# Patient Record
Sex: FEMALE | Race: WHITE | NOT HISPANIC OR LATINO | URBAN - METROPOLITAN AREA
[De-identification: names, ages, dates, MRNs, and addresses within clinical notes are randomized per-mention and may not be internally consistent; named-entity substitution may affect disease eponyms.]

---

## 2018-02-07 ENCOUNTER — EMERGENCY (EMERGENCY)
Facility: HOSPITAL | Age: 83
LOS: 1 days | Discharge: HOME | End: 2018-02-07
Attending: EMERGENCY MEDICINE | Admitting: EMERGENCY MEDICINE
Payer: MEDICARE

## 2018-02-07 VITALS
RESPIRATION RATE: 18 BRPM | OXYGEN SATURATION: 94 % | HEART RATE: 55 BPM | DIASTOLIC BLOOD PRESSURE: 76 MMHG | TEMPERATURE: 98 F | SYSTOLIC BLOOD PRESSURE: 164 MMHG

## 2018-02-07 LAB
ALBUMIN SERPL ELPH-MCNC: 3.2 G/DL — LOW (ref 3.3–5)
ALP SERPL-CCNC: 61 U/L — SIGNIFICANT CHANGE UP (ref 40–120)
ALT FLD-CCNC: 7 U/L RC — LOW (ref 10–45)
ANION GAP SERPL CALC-SCNC: 11 MMOL/L — SIGNIFICANT CHANGE UP (ref 5–17)
APPEARANCE UR: ABNORMAL
AST SERPL-CCNC: 12 U/L — SIGNIFICANT CHANGE UP (ref 10–40)
BASOPHILS # BLD AUTO: 0 K/UL — SIGNIFICANT CHANGE UP (ref 0–0.2)
BASOPHILS NFR BLD AUTO: 0.3 % — SIGNIFICANT CHANGE UP (ref 0–2)
BILIRUB SERPL-MCNC: 0.9 MG/DL — SIGNIFICANT CHANGE UP (ref 0.2–1.2)
BILIRUB UR-MCNC: NEGATIVE — SIGNIFICANT CHANGE UP
BUN SERPL-MCNC: 8 MG/DL — SIGNIFICANT CHANGE UP (ref 7–23)
CALCIUM SERPL-MCNC: 8.8 MG/DL — SIGNIFICANT CHANGE UP (ref 8.4–10.5)
CHLORIDE SERPL-SCNC: 106 MMOL/L — SIGNIFICANT CHANGE UP (ref 96–108)
CO2 SERPL-SCNC: 27 MMOL/L — SIGNIFICANT CHANGE UP (ref 22–31)
COLOR SPEC: YELLOW — SIGNIFICANT CHANGE UP
CREAT SERPL-MCNC: 0.58 MG/DL — SIGNIFICANT CHANGE UP (ref 0.5–1.3)
DIFF PNL FLD: NEGATIVE — SIGNIFICANT CHANGE UP
DIGOXIN SERPL-MCNC: 0.9 NG/ML — SIGNIFICANT CHANGE UP (ref 0.8–2)
EOSINOPHIL # BLD AUTO: 0.1 K/UL — SIGNIFICANT CHANGE UP (ref 0–0.5)
EOSINOPHIL NFR BLD AUTO: 1.5 % — SIGNIFICANT CHANGE UP (ref 0–6)
EPI CELLS # UR: SIGNIFICANT CHANGE UP /HPF
GAS PNL BLDV: SIGNIFICANT CHANGE UP
GAS PNL BLDV: SIGNIFICANT CHANGE UP
GLUCOSE SERPL-MCNC: 89 MG/DL — SIGNIFICANT CHANGE UP (ref 70–99)
GLUCOSE UR QL: NEGATIVE — SIGNIFICANT CHANGE UP
HCT VFR BLD CALC: 45.6 % — HIGH (ref 34.5–45)
HGB BLD-MCNC: 15.3 G/DL — SIGNIFICANT CHANGE UP (ref 11.5–15.5)
KETONES UR-MCNC: NEGATIVE — SIGNIFICANT CHANGE UP
LEUKOCYTE ESTERASE UR-ACNC: ABNORMAL
LYMPHOCYTES # BLD AUTO: 1.4 K/UL — SIGNIFICANT CHANGE UP (ref 1–3.3)
LYMPHOCYTES # BLD AUTO: 14.8 % — SIGNIFICANT CHANGE UP (ref 13–44)
MCHC RBC-ENTMCNC: 32.6 PG — SIGNIFICANT CHANGE UP (ref 27–34)
MCHC RBC-ENTMCNC: 33.5 GM/DL — SIGNIFICANT CHANGE UP (ref 32–36)
MCV RBC AUTO: 97.3 FL — SIGNIFICANT CHANGE UP (ref 80–100)
MONOCYTES # BLD AUTO: 0.6 K/UL — SIGNIFICANT CHANGE UP (ref 0–0.9)
MONOCYTES NFR BLD AUTO: 6.7 % — SIGNIFICANT CHANGE UP (ref 2–14)
NEUTROPHILS # BLD AUTO: 7.4 K/UL — SIGNIFICANT CHANGE UP (ref 1.8–7.4)
NEUTROPHILS NFR BLD AUTO: 76.7 % — SIGNIFICANT CHANGE UP (ref 43–77)
NITRITE UR-MCNC: POSITIVE
PH UR: 7 — SIGNIFICANT CHANGE UP (ref 5–8)
PLATELET # BLD AUTO: 236 K/UL — SIGNIFICANT CHANGE UP (ref 150–400)
POTASSIUM SERPL-MCNC: 4.2 MMOL/L — SIGNIFICANT CHANGE UP (ref 3.5–5.3)
POTASSIUM SERPL-SCNC: 4.2 MMOL/L — SIGNIFICANT CHANGE UP (ref 3.5–5.3)
PROT SERPL-MCNC: 6 G/DL — SIGNIFICANT CHANGE UP (ref 6–8.3)
PROT UR-MCNC: SIGNIFICANT CHANGE UP
RAPID RVP RESULT: SIGNIFICANT CHANGE UP
RBC # BLD: 4.68 M/UL — SIGNIFICANT CHANGE UP (ref 3.8–5.2)
RBC # FLD: 12.9 % — SIGNIFICANT CHANGE UP (ref 10.3–14.5)
RBC CASTS # UR COMP ASSIST: SIGNIFICANT CHANGE UP /HPF (ref 0–2)
SODIUM SERPL-SCNC: 144 MMOL/L — SIGNIFICANT CHANGE UP (ref 135–145)
SP GR SPEC: 1.02 — SIGNIFICANT CHANGE UP (ref 1.01–1.02)
UROBILINOGEN FLD QL: NEGATIVE — SIGNIFICANT CHANGE UP
WBC # BLD: 9.6 K/UL — SIGNIFICANT CHANGE UP (ref 3.8–10.5)
WBC # FLD AUTO: 9.6 K/UL — SIGNIFICANT CHANGE UP (ref 3.8–10.5)
WBC UR QL: SIGNIFICANT CHANGE UP /HPF (ref 0–5)

## 2018-02-07 PROCEDURE — 71045 X-RAY EXAM CHEST 1 VIEW: CPT

## 2018-02-07 PROCEDURE — 99284 EMERGENCY DEPT VISIT MOD MDM: CPT | Mod: 25

## 2018-02-07 PROCEDURE — 82565 ASSAY OF CREATININE: CPT

## 2018-02-07 PROCEDURE — 93010 ELECTROCARDIOGRAM REPORT: CPT

## 2018-02-07 PROCEDURE — 70450 CT HEAD/BRAIN W/O DYE: CPT | Mod: 26

## 2018-02-07 PROCEDURE — 80162 ASSAY OF DIGOXIN TOTAL: CPT

## 2018-02-07 PROCEDURE — 96374 THER/PROPH/DIAG INJ IV PUSH: CPT

## 2018-02-07 PROCEDURE — 70450 CT HEAD/BRAIN W/O DYE: CPT

## 2018-02-07 PROCEDURE — 87086 URINE CULTURE/COLONY COUNT: CPT

## 2018-02-07 PROCEDURE — 82962 GLUCOSE BLOOD TEST: CPT

## 2018-02-07 PROCEDURE — 83605 ASSAY OF LACTIC ACID: CPT

## 2018-02-07 PROCEDURE — 84132 ASSAY OF SERUM POTASSIUM: CPT

## 2018-02-07 PROCEDURE — 82435 ASSAY OF BLOOD CHLORIDE: CPT

## 2018-02-07 PROCEDURE — 84295 ASSAY OF SERUM SODIUM: CPT

## 2018-02-07 PROCEDURE — 87186 SC STD MICRODIL/AGAR DIL: CPT

## 2018-02-07 PROCEDURE — 87798 DETECT AGENT NOS DNA AMP: CPT

## 2018-02-07 PROCEDURE — 85014 HEMATOCRIT: CPT

## 2018-02-07 PROCEDURE — 93005 ELECTROCARDIOGRAM TRACING: CPT

## 2018-02-07 PROCEDURE — 87633 RESP VIRUS 12-25 TARGETS: CPT

## 2018-02-07 PROCEDURE — 87486 CHLMYD PNEUM DNA AMP PROBE: CPT

## 2018-02-07 PROCEDURE — 85027 COMPLETE CBC AUTOMATED: CPT

## 2018-02-07 PROCEDURE — 81001 URINALYSIS AUTO W/SCOPE: CPT

## 2018-02-07 PROCEDURE — 87581 M.PNEUMON DNA AMP PROBE: CPT

## 2018-02-07 PROCEDURE — 82803 BLOOD GASES ANY COMBINATION: CPT

## 2018-02-07 PROCEDURE — 82947 ASSAY GLUCOSE BLOOD QUANT: CPT

## 2018-02-07 PROCEDURE — 80053 COMPREHEN METABOLIC PANEL: CPT

## 2018-02-07 PROCEDURE — 82330 ASSAY OF CALCIUM: CPT

## 2018-02-07 PROCEDURE — 71045 X-RAY EXAM CHEST 1 VIEW: CPT | Mod: 26

## 2018-02-07 RX ORDER — CEFTRIAXONE 500 MG/1
1 INJECTION, POWDER, FOR SOLUTION INTRAMUSCULAR; INTRAVENOUS ONCE
Qty: 0 | Refills: 0 | Status: COMPLETED | OUTPATIENT
Start: 2018-02-07 | End: 2018-02-07

## 2018-02-07 RX ORDER — CEFUROXIME AXETIL 250 MG
1 TABLET ORAL
Qty: 10 | Refills: 0 | OUTPATIENT
Start: 2018-02-07 | End: 2018-02-11

## 2018-02-07 RX ADMIN — CEFTRIAXONE 100 GRAM(S): 500 INJECTION, POWDER, FOR SOLUTION INTRAMUSCULAR; INTRAVENOUS at 11:30

## 2018-02-07 NOTE — ED PROVIDER NOTE - CARE PLAN
Principal Discharge DX:	Encephalopathy acute Principal Discharge DX:	Encephalopathy acute  Secondary Diagnosis:	UTI (urinary tract infection) Principal Discharge DX:	Encephalopathy acute  Assessment and plan of treatment:	You were found to have a urinary tract infection. Please complete the antibiotics as prescribed.     Return to the ED for repetitive nausea or vomiting, worsening confusion or any other concerns.     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  Secondary Diagnosis:	UTI (urinary tract infection)

## 2018-02-07 NOTE — ED PROVIDER NOTE - PLAN OF CARE
You were found to have a urinary tract infection. Please complete the antibiotics as prescribed.     Return to the ED for repetitive nausea or vomiting, worsening confusion or any other concerns.     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

## 2018-02-07 NOTE — ED PROVIDER NOTE - MEDICAL DECISION MAKING DETAILS
Attending MD Campo: 89F with h/o dementia, afib (no reported AC) presenting from assisted living facility with report of confusion, unknown baseline, pt oriented x 1 here, no focal neuro deficits. Ddx broad but includes infectious or metabolic encephalopathy. Plan for UA, CXR, labs, CT head and re-eval

## 2018-02-07 NOTE — ED PROVIDER NOTE - OBJECTIVE STATEMENT
89 yo female with PMHx of Dementia, HTN, HLD, CAD on plavix, Afib on digoxin, recurrent UTIs p/w AMS.  Per EMS, patient coming from [facility]  Staff reporting the patient had altered mental status this AM and appeared more lethargic so they sent her to the ER. Patient unable to provide additional history.  She reports that she is hungry, but otherwise has no complaints.  Currently AAOx 2, baseline unknown.

## 2018-02-07 NOTE — ED PROVIDER NOTE - PROGRESS NOTE DETAILS
Attending MD Campo: patient eating well currently. Labs unrevealing, no signs of sepsis. UA consistent with UTI, likely cause of mild confusion. Given IV CTX, will dc back to assisted living facility with PO ceftin.

## 2018-02-07 NOTE — ED ADULT NURSE NOTE - OBJECTIVE STATEMENT
Pt is a 89 year old female sent in from  nursing home for lethargy. No distress. Breathing easy and non labored. Able to move all extremities. Pt alert with intermittent confusion. Skin warm and dry to touch. No chills. No diaphoresis. Skin intact. Lower extremities with mild swelling.

## 2018-02-07 NOTE — ED PROVIDER NOTE - PHYSICAL EXAMINATION
Attending MD Campo: A & O x 1, forgetful, NAD,, EOMI b/l, PERRL b/l; lungs CTAB, heart with reg rhythm without murmur; abdomen soft NTND; 2+ pitting edema b/l ankles; affect appropriate. neuro exam non focal with no motor or sensory deficits.

## 2018-02-07 NOTE — ED PROVIDER NOTE - ATTENDING CONTRIBUTION TO CARE
Attending MD Campo:   I personally have seen and examined this patient.  Physician assistant note reviewed and agree on plan of care and except where noted.  See HPI, PE, and MDM for details.

## 2018-02-09 RX ORDER — CEFPODOXIME PROXETIL 100 MG
1 TABLET ORAL
Qty: 20 | Refills: 0 | OUTPATIENT
Start: 2018-02-09 | End: 2018-02-18

## 2018-02-09 NOTE — ED POST DISCHARGE NOTE - DETAILS
patient is at [facility], left message for Elizabeth Haider who is caring for the patient.

## 2018-02-09 NOTE — ED POST DISCHARGE NOTE - OTHER COMMUNICATION
I spoke w Elizabeth Haider and discussed the need to switch ceftin to cefpodoxime. - Daron Layne PA-C

## 2018-02-09 NOTE — ED POST DISCHARGE NOTE - RESULT SUMMARY
UCx with > 100,000 cfu/ml of EColi. Prescribed ceftin. Awaiting sensitivities. UCx with > 100,000 cfu/ml of EColi. Prescribed ceftin. Awaiting sensitivities.. Sensitivities returned and Intermetiate sensitivity for 2nd gen cephalosporin. Will prescribe either bactrim or cefpodoxime. - Daron Layne PA-C

## 2018-03-24 ENCOUNTER — EMERGENCY (EMERGENCY)
Facility: HOSPITAL | Age: 83
LOS: 1 days | Discharge: HOME | End: 2018-03-24
Attending: EMERGENCY MEDICINE
Payer: MEDICARE

## 2018-03-24 VITALS
SYSTOLIC BLOOD PRESSURE: 164 MMHG | OXYGEN SATURATION: 95 % | HEART RATE: 60 BPM | DIASTOLIC BLOOD PRESSURE: 75 MMHG | RESPIRATION RATE: 20 BRPM | TEMPERATURE: 98 F

## 2018-03-24 VITALS
TEMPERATURE: 98 F | SYSTOLIC BLOOD PRESSURE: 139 MMHG | DIASTOLIC BLOOD PRESSURE: 83 MMHG | OXYGEN SATURATION: 94 % | HEART RATE: 67 BPM

## 2018-03-24 LAB
ACANTHOCYTES BLD QL SMEAR: SLIGHT — SIGNIFICANT CHANGE UP
ALBUMIN SERPL ELPH-MCNC: 3.5 G/DL — SIGNIFICANT CHANGE UP (ref 3.3–5)
ALP SERPL-CCNC: 68 U/L — SIGNIFICANT CHANGE UP (ref 40–120)
ALT FLD-CCNC: 13 U/L RC — SIGNIFICANT CHANGE UP (ref 10–45)
ANION GAP SERPL CALC-SCNC: 12 MMOL/L — SIGNIFICANT CHANGE UP (ref 5–17)
ANISOCYTOSIS BLD QL: SLIGHT — SIGNIFICANT CHANGE UP
AST SERPL-CCNC: 20 U/L — SIGNIFICANT CHANGE UP (ref 10–40)
BASE EXCESS BLDV CALC-SCNC: -0.9 MMOL/L — SIGNIFICANT CHANGE UP (ref -2–2)
BASOPHILS # BLD AUTO: 0 K/UL — SIGNIFICANT CHANGE UP (ref 0–0.2)
BASOPHILS NFR BLD AUTO: 0.2 % — SIGNIFICANT CHANGE UP (ref 0–2)
BILIRUB SERPL-MCNC: 0.5 MG/DL — SIGNIFICANT CHANGE UP (ref 0.2–1.2)
BUN SERPL-MCNC: 14 MG/DL — SIGNIFICANT CHANGE UP (ref 7–23)
CA-I SERPL-SCNC: 1.14 MMOL/L — SIGNIFICANT CHANGE UP (ref 1.12–1.3)
CALCIUM SERPL-MCNC: 9.1 MG/DL — SIGNIFICANT CHANGE UP (ref 8.4–10.5)
CHLORIDE BLDV-SCNC: 112 MMOL/L — HIGH (ref 96–108)
CHLORIDE SERPL-SCNC: 104 MMOL/L — SIGNIFICANT CHANGE UP (ref 96–108)
CO2 BLDV-SCNC: 23 MMOL/L — SIGNIFICANT CHANGE UP (ref 22–30)
CO2 SERPL-SCNC: 23 MMOL/L — SIGNIFICANT CHANGE UP (ref 22–31)
CREAT SERPL-MCNC: 0.67 MG/DL — SIGNIFICANT CHANGE UP (ref 0.5–1.3)
EOSINOPHIL # BLD AUTO: 0.2 K/UL — SIGNIFICANT CHANGE UP (ref 0–0.5)
EOSINOPHIL NFR BLD AUTO: 1.5 % — SIGNIFICANT CHANGE UP (ref 0–6)
GAS PNL BLDV: 134 MMOL/L — LOW (ref 136–145)
GAS PNL BLDV: SIGNIFICANT CHANGE UP
GAS PNL BLDV: SIGNIFICANT CHANGE UP
GLUCOSE BLDV-MCNC: 111 MG/DL — HIGH (ref 70–99)
GLUCOSE SERPL-MCNC: 121 MG/DL — HIGH (ref 70–99)
HCO3 BLDV-SCNC: 22 MMOL/L — SIGNIFICANT CHANGE UP (ref 21–29)
HCT VFR BLD CALC: 46.5 % — HIGH (ref 34.5–45)
HCT VFR BLDA CALC: 46 % — SIGNIFICANT CHANGE UP (ref 39–50)
HGB BLD CALC-MCNC: 15 G/DL — SIGNIFICANT CHANGE UP (ref 11.5–15.5)
HGB BLD-MCNC: 15.4 G/DL — SIGNIFICANT CHANGE UP (ref 11.5–15.5)
HOROWITZ INDEX BLDV+IHG-RTO: SIGNIFICANT CHANGE UP
LACTATE BLDV-MCNC: 2 MMOL/L — SIGNIFICANT CHANGE UP (ref 0.7–2)
LYMPHOCYTES # BLD AUTO: 1.4 K/UL — SIGNIFICANT CHANGE UP (ref 1–3.3)
LYMPHOCYTES # BLD AUTO: 12.9 % — LOW (ref 13–44)
MCHC RBC-ENTMCNC: 32.1 PG — SIGNIFICANT CHANGE UP (ref 27–34)
MCHC RBC-ENTMCNC: 33.1 GM/DL — SIGNIFICANT CHANGE UP (ref 32–36)
MCV RBC AUTO: 97.1 FL — SIGNIFICANT CHANGE UP (ref 80–100)
MONOCYTES # BLD AUTO: 0.8 K/UL — SIGNIFICANT CHANGE UP (ref 0–0.9)
MONOCYTES NFR BLD AUTO: 7.2 % — SIGNIFICANT CHANGE UP (ref 2–14)
NEUTROPHILS # BLD AUTO: 8.2 K/UL — HIGH (ref 1.8–7.4)
NEUTROPHILS NFR BLD AUTO: 78.2 % — HIGH (ref 43–77)
OVALOCYTES BLD QL SMEAR: SLIGHT — SIGNIFICANT CHANGE UP
PCO2 BLDV: 34 MMHG — LOW (ref 35–50)
PH BLDV: 7.44 — SIGNIFICANT CHANGE UP (ref 7.35–7.45)
PLAT MORPH BLD: NORMAL — SIGNIFICANT CHANGE UP
PLATELET # BLD AUTO: 180 K/UL — SIGNIFICANT CHANGE UP (ref 150–400)
PO2 BLDV: 46 MMHG — HIGH (ref 25–45)
POIKILOCYTOSIS BLD QL AUTO: SLIGHT — SIGNIFICANT CHANGE UP
POTASSIUM BLDV-SCNC: 4.6 MMOL/L — SIGNIFICANT CHANGE UP (ref 3.5–5)
POTASSIUM SERPL-MCNC: 4.8 MMOL/L — SIGNIFICANT CHANGE UP (ref 3.5–5.3)
POTASSIUM SERPL-SCNC: 4.8 MMOL/L — SIGNIFICANT CHANGE UP (ref 3.5–5.3)
PROT SERPL-MCNC: 6.5 G/DL — SIGNIFICANT CHANGE UP (ref 6–8.3)
RBC # BLD: 4.79 M/UL — SIGNIFICANT CHANGE UP (ref 3.8–5.2)
RBC # FLD: 13.3 % — SIGNIFICANT CHANGE UP (ref 10.3–14.5)
RBC BLD AUTO: ABNORMAL
SAO2 % BLDV: 79 % — SIGNIFICANT CHANGE UP (ref 67–88)
SODIUM SERPL-SCNC: 139 MMOL/L — SIGNIFICANT CHANGE UP (ref 135–145)
WBC # BLD: 10.4 K/UL — SIGNIFICANT CHANGE UP (ref 3.8–10.5)
WBC # FLD AUTO: 10.4 K/UL — SIGNIFICANT CHANGE UP (ref 3.8–10.5)

## 2018-03-24 PROCEDURE — 84295 ASSAY OF SERUM SODIUM: CPT

## 2018-03-24 PROCEDURE — 71045 X-RAY EXAM CHEST 1 VIEW: CPT

## 2018-03-24 PROCEDURE — 80053 COMPREHEN METABOLIC PANEL: CPT

## 2018-03-24 PROCEDURE — 93005 ELECTROCARDIOGRAM TRACING: CPT

## 2018-03-24 PROCEDURE — 73562 X-RAY EXAM OF KNEE 3: CPT

## 2018-03-24 PROCEDURE — 70450 CT HEAD/BRAIN W/O DYE: CPT | Mod: 26

## 2018-03-24 PROCEDURE — 82803 BLOOD GASES ANY COMBINATION: CPT

## 2018-03-24 PROCEDURE — 99284 EMERGENCY DEPT VISIT MOD MDM: CPT | Mod: 25

## 2018-03-24 PROCEDURE — 85027 COMPLETE CBC AUTOMATED: CPT

## 2018-03-24 PROCEDURE — 90715 TDAP VACCINE 7 YRS/> IM: CPT

## 2018-03-24 PROCEDURE — 90471 IMMUNIZATION ADMIN: CPT

## 2018-03-24 PROCEDURE — 72170 X-RAY EXAM OF PELVIS: CPT | Mod: 26

## 2018-03-24 PROCEDURE — 85014 HEMATOCRIT: CPT

## 2018-03-24 PROCEDURE — 99284 EMERGENCY DEPT VISIT MOD MDM: CPT | Mod: 25,GC

## 2018-03-24 PROCEDURE — 71045 X-RAY EXAM CHEST 1 VIEW: CPT | Mod: 26

## 2018-03-24 PROCEDURE — 70450 CT HEAD/BRAIN W/O DYE: CPT

## 2018-03-24 PROCEDURE — 83605 ASSAY OF LACTIC ACID: CPT

## 2018-03-24 PROCEDURE — 84132 ASSAY OF SERUM POTASSIUM: CPT

## 2018-03-24 PROCEDURE — 93010 ELECTROCARDIOGRAM REPORT: CPT

## 2018-03-24 PROCEDURE — 82947 ASSAY GLUCOSE BLOOD QUANT: CPT

## 2018-03-24 PROCEDURE — 73562 X-RAY EXAM OF KNEE 3: CPT | Mod: 26,RT

## 2018-03-24 PROCEDURE — 82330 ASSAY OF CALCIUM: CPT

## 2018-03-24 PROCEDURE — 82435 ASSAY OF BLOOD CHLORIDE: CPT

## 2018-03-24 PROCEDURE — 72170 X-RAY EXAM OF PELVIS: CPT

## 2018-03-24 RX ORDER — TETANUS TOXOID, REDUCED DIPHTHERIA TOXOID AND ACELLULAR PERTUSSIS VACCINE, ADSORBED 5; 2.5; 8; 8; 2.5 [IU]/.5ML; [IU]/.5ML; UG/.5ML; UG/.5ML; UG/.5ML
0.5 SUSPENSION INTRAMUSCULAR ONCE
Qty: 0 | Refills: 0 | Status: COMPLETED | OUTPATIENT
Start: 2018-03-24 | End: 2018-03-24

## 2018-03-24 RX ORDER — ACETAMINOPHEN 500 MG
650 TABLET ORAL ONCE
Qty: 0 | Refills: 0 | Status: COMPLETED | OUTPATIENT
Start: 2018-03-24 | End: 2018-03-24

## 2018-03-24 RX ORDER — SODIUM CHLORIDE 9 MG/ML
500 INJECTION INTRAMUSCULAR; INTRAVENOUS; SUBCUTANEOUS ONCE
Qty: 0 | Refills: 0 | Status: COMPLETED | OUTPATIENT
Start: 2018-03-24 | End: 2018-03-24

## 2018-03-24 RX ADMIN — SODIUM CHLORIDE 1000 MILLILITER(S): 9 INJECTION INTRAMUSCULAR; INTRAVENOUS; SUBCUTANEOUS at 14:48

## 2018-03-24 RX ADMIN — Medication 650 MILLIGRAM(S): at 15:10

## 2018-03-24 RX ADMIN — TETANUS TOXOID, REDUCED DIPHTHERIA TOXOID AND ACELLULAR PERTUSSIS VACCINE, ADSORBED 0.5 MILLILITER(S): 5; 2.5; 8; 8; 2.5 SUSPENSION INTRAMUSCULAR at 15:10

## 2018-03-24 NOTE — ED ADULT NURSE REASSESSMENT NOTE - NS ED NURSE REASSESS COMMENT FT1
ems arrived, patient fully dressed and all belongings returned. iv removed. patient stable and had successful transfer back to [facility]

## 2018-03-24 NOTE — ED ADULT NURSE REASSESSMENT NOTE - NS ED NURSE REASSESS COMMENT FT1
pt able to ambulate with two assist, usually uses a walker. meal provided and patient tolerating well.

## 2018-03-24 NOTE — ED PROVIDER NOTE - PHYSICAL EXAMINATION
Gen: NAD, AOx2  Head: NCAT  HEENT: PERRL, oral mucosa moist, normal conjunctiva, neck supple  Lung: +crackles/rhonchi left base, +tachypneic  CV: rrr, no murmur, Normal perfusion  Abd: soft, NTND  MSK: No edema, no visible deformities, +ttp rt ttp at quadriceps insertion @ patella with extension mechanism intact, pelvis stable, FROM b/l hips without pain. no chest wall pain. no UE ttp. no midline C/T/L ttp  Neuro: No focal neurologic deficits  Skin: No rash   Psych: normal affect

## 2018-03-24 NOTE — ED PROVIDER NOTE - MEDICAL DECISION MAKING DETAILS
89yo F with unwitnessed fall in NH 1 week ago, h/o dementia, poor historian, complaining of rt knee pain with ambulation as well as cough rhinorrhea. patient in no distress but appears tachypneic with rhonchi on lung exam. will r/o PNA. check basic labs. xray rt knee- has ecchymosis but low suspicion for bony injury. will xray. reasses 89yo F with unwitnessed fall in NH 1 week ago, h/o dementia, poor historian, complaining of rt knee pain with ambulation as well as cough rhinorrhea. patient in no distress but appears tachypneic with rhonchi on lung exam. will r/o PNA. check basic labs. xray rt knee- has ecchymosis but low suspicion for bony injury. will xray. reassoscar Fam MD: Agree with above assessment and plan.

## 2018-03-24 NOTE — ED ADULT NURSE NOTE - OBJECTIVE STATEMENT
89 y.o female brought in by ems from the [facility] for right knee pain s.p fall last week. pt is a&Ox2, unable to fully explain fall but states she hit her head "hard" and fell onto her right knee. right knee has old healing bruising present on anterior right knee, +pulses b/l, and sensation / circulation intact. strength equal b/l. patient states that she has increased pain with ambulation, patient uses a walker at the facility. patient has a skin tear to her right elbow, no active bleeding. patients head is asymptomatic, no signs of trauma. hx of a fib, dementia, anxiety, gerd, glaucoma, htn. patient takes plavix 75 mg daily.   Patient undressed and placed into gown, call bell in hand and side rails up for safety. warm blanket provided, vital signs stable, pt in no acute distress.

## 2018-03-24 NOTE — ED PROVIDER NOTE - OBJECTIVE STATEMENT
89yo F with fall last week, no syncope, fell onto rt knee and did hit top of head. on plavix. lives at NH, today alerted staff to fall and knee pain who sent to ED. has dementia so does not fully recall event. +pain with walking with walker. no HA. no vision changes. no CP/SOB. +cough mild productive. no change in appetite. +rhinorrhea. no fevers. no n/v/d. no urinary sx.

## 2018-03-24 NOTE — ED PROVIDER NOTE - PLAN OF CARE
1. Return to ED for worsening, progressive or any other concerning symptoms   2. Follow up with your primary care doctor in 2-3days   3. Take Tylenol up to 650 mg every 6 hours as needed for pain.

## 2018-03-24 NOTE — ED PROVIDER NOTE - NS ED ROS FT
ROS: no CP/SOB. +cough. no fever. no n/v/d/c. no abd pain. no rash. no bleeding. no urinary complaints. no weakness. no vision changes. no HA. no neck/back pain. +extremity swelling/deformity. No change in mental status.

## 2018-03-24 NOTE — ED ADULT NURSE REASSESSMENT NOTE - NS ED NURSE REASSESS COMMENT FT1
Pending non emergent ambulance. Crackers provided, pt tolerating well. IV removed and all paperwork in chart ready for .   Pt resting comfortably with VSS, no complaints at this time. Patient's bed in the lowest position, explained plan of care to patient and family members. Will continue to monitor.

## 2018-03-24 NOTE — ED PROVIDER NOTE - CARE PLAN
Principal Discharge DX:	Knee injuries, right, initial encounter  Assessment and plan of treatment:	1. Return to ED for worsening, progressive or any other concerning symptoms   2. Follow up with your primary care doctor in 2-3days   3. Take Tylenol up to 650 mg every 6 hours as needed for pain.  Secondary Diagnosis:	Skin tear of elbow without complication, right, initial encounter

## 2018-06-07 ENCOUNTER — INPATIENT (INPATIENT)
Facility: HOSPITAL | Age: 83
LOS: 5 days | Discharge: NURSING, REHAB OR LONG-TERM CARE | DRG: 177 | End: 2018-06-13
Admitting: INTERNAL MEDICINE
Payer: MEDICARE

## 2018-06-07 VITALS
HEART RATE: 75 BPM | OXYGEN SATURATION: 93 % | SYSTOLIC BLOOD PRESSURE: 127 MMHG | TEMPERATURE: 98 F | DIASTOLIC BLOOD PRESSURE: 67 MMHG | RESPIRATION RATE: 19 BRPM

## 2018-06-07 DIAGNOSIS — R06.03 ACUTE RESPIRATORY DISTRESS: ICD-10-CM

## 2018-06-07 LAB
ALBUMIN SERPL ELPH-MCNC: 3.4 G/DL — SIGNIFICANT CHANGE UP (ref 3.3–5)
ALP SERPL-CCNC: 76 U/L — SIGNIFICANT CHANGE UP (ref 40–120)
ALT FLD-CCNC: 8 U/L — LOW (ref 10–45)
ANION GAP SERPL CALC-SCNC: 14 MMOL/L — SIGNIFICANT CHANGE UP (ref 5–17)
APPEARANCE UR: ABNORMAL
AST SERPL-CCNC: 13 U/L — SIGNIFICANT CHANGE UP (ref 10–40)
BACTERIA # UR AUTO: ABNORMAL /HPF
BASOPHILS # BLD AUTO: 0 K/UL — SIGNIFICANT CHANGE UP (ref 0–0.2)
BILIRUB SERPL-MCNC: 0.9 MG/DL — SIGNIFICANT CHANGE UP (ref 0.2–1.2)
BILIRUB UR-MCNC: NEGATIVE — SIGNIFICANT CHANGE UP
BUN SERPL-MCNC: 18 MG/DL — SIGNIFICANT CHANGE UP (ref 7–23)
CALCIUM SERPL-MCNC: 9.2 MG/DL — SIGNIFICANT CHANGE UP (ref 8.4–10.5)
CHLORIDE SERPL-SCNC: 100 MMOL/L — SIGNIFICANT CHANGE UP (ref 96–108)
CO2 SERPL-SCNC: 23 MMOL/L — SIGNIFICANT CHANGE UP (ref 22–31)
COLOR SPEC: YELLOW — SIGNIFICANT CHANGE UP
CREAT SERPL-MCNC: 0.6 MG/DL — SIGNIFICANT CHANGE UP (ref 0.5–1.3)
DIFF PNL FLD: NEGATIVE — SIGNIFICANT CHANGE UP
DIGOXIN SERPL-MCNC: 1 NG/ML — SIGNIFICANT CHANGE UP (ref 0.8–2)
EOSINOPHIL # BLD AUTO: 0 K/UL — SIGNIFICANT CHANGE UP (ref 0–0.5)
EPI CELLS # UR: SIGNIFICANT CHANGE UP /HPF
GAS PNL BLDV: SIGNIFICANT CHANGE UP
GLUCOSE SERPL-MCNC: 131 MG/DL — HIGH (ref 70–99)
GLUCOSE UR QL: NEGATIVE — SIGNIFICANT CHANGE UP
HCT VFR BLD CALC: 47.5 % — HIGH (ref 34.5–45)
HGB BLD-MCNC: 16 G/DL — HIGH (ref 11.5–15.5)
KETONES UR-MCNC: ABNORMAL
LEUKOCYTE ESTERASE UR-ACNC: ABNORMAL
LYMPHOCYTES # BLD AUTO: 1.2 K/UL — SIGNIFICANT CHANGE UP (ref 1–3.3)
LYMPHOCYTES # BLD AUTO: 7 % — LOW (ref 13–44)
MCHC RBC-ENTMCNC: 32.9 PG — SIGNIFICANT CHANGE UP (ref 27–34)
MCHC RBC-ENTMCNC: 33.8 GM/DL — SIGNIFICANT CHANGE UP (ref 32–36)
MCV RBC AUTO: 97.4 FL — SIGNIFICANT CHANGE UP (ref 80–100)
MONOCYTES # BLD AUTO: 1.2 K/UL — HIGH (ref 0–0.9)
MONOCYTES NFR BLD AUTO: 4 % — SIGNIFICANT CHANGE UP (ref 2–14)
NEUTROPHILS # BLD AUTO: 18.7 K/UL — HIGH (ref 1.8–7.4)
NEUTROPHILS NFR BLD AUTO: 86 % — HIGH (ref 43–77)
NITRITE UR-MCNC: POSITIVE
PH UR: 6 — SIGNIFICANT CHANGE UP (ref 5–8)
PLATELET # BLD AUTO: 222 K/UL — SIGNIFICANT CHANGE UP (ref 150–400)
POTASSIUM SERPL-MCNC: 4.1 MMOL/L — SIGNIFICANT CHANGE UP (ref 3.5–5.3)
POTASSIUM SERPL-SCNC: 4.1 MMOL/L — SIGNIFICANT CHANGE UP (ref 3.5–5.3)
PROT SERPL-MCNC: 6.7 G/DL — SIGNIFICANT CHANGE UP (ref 6–8.3)
PROT UR-MCNC: 100 MG/DL
RAPID RVP RESULT: SIGNIFICANT CHANGE UP
RBC # BLD: 4.88 M/UL — SIGNIFICANT CHANGE UP (ref 3.8–5.2)
RBC # FLD: 13.8 % — SIGNIFICANT CHANGE UP (ref 10.3–14.5)
SODIUM SERPL-SCNC: 137 MMOL/L — SIGNIFICANT CHANGE UP (ref 135–145)
SP GR SPEC: 1.03 — HIGH (ref 1.01–1.02)
UROBILINOGEN FLD QL: NEGATIVE — SIGNIFICANT CHANGE UP
WBC # BLD: 21.1 K/UL — HIGH (ref 3.8–10.5)
WBC # FLD AUTO: 21.1 K/UL — HIGH (ref 3.8–10.5)
WBC UR QL: >50 /HPF (ref 0–5)

## 2018-06-07 PROCEDURE — 99223 1ST HOSP IP/OBS HIGH 75: CPT | Mod: GC

## 2018-06-07 PROCEDURE — 71045 X-RAY EXAM CHEST 1 VIEW: CPT | Mod: 26

## 2018-06-07 PROCEDURE — 93010 ELECTROCARDIOGRAM REPORT: CPT

## 2018-06-07 PROCEDURE — 99285 EMERGENCY DEPT VISIT HI MDM: CPT | Mod: 25,GC

## 2018-06-07 PROCEDURE — 99497 ADVNCD CARE PLAN 30 MIN: CPT | Mod: 25

## 2018-06-07 PROCEDURE — 71275 CT ANGIOGRAPHY CHEST: CPT | Mod: 26

## 2018-06-07 RX ORDER — SODIUM CHLORIDE 9 MG/ML
1000 INJECTION INTRAMUSCULAR; INTRAVENOUS; SUBCUTANEOUS ONCE
Qty: 0 | Refills: 0 | Status: COMPLETED | OUTPATIENT
Start: 2018-06-07 | End: 2018-06-07

## 2018-06-07 RX ORDER — IPRATROPIUM/ALBUTEROL SULFATE 18-103MCG
3 AEROSOL WITH ADAPTER (GRAM) INHALATION ONCE
Qty: 0 | Refills: 0 | Status: COMPLETED | OUTPATIENT
Start: 2018-06-07 | End: 2018-06-07

## 2018-06-07 RX ADMIN — SODIUM CHLORIDE 1000 MILLILITER(S): 9 INJECTION INTRAMUSCULAR; INTRAVENOUS; SUBCUTANEOUS at 19:35

## 2018-06-07 RX ADMIN — Medication 3 MILLILITER(S): at 18:25

## 2018-06-07 NOTE — ED PROVIDER NOTE - CARE PLAN
Principal Discharge DX:	Respiratory distress  Secondary Diagnosis:	Urinary tract infection  Secondary Diagnosis:	Hypoxia

## 2018-06-07 NOTE — ED PROVIDER NOTE - PROGRESS NOTE DETAILS
discussed with Dr. Beck, will admit.   Brandon VOSS- patient seen and reassessed. A&Ox1 at baseline per daughter in [place]. now patient seems to be responding to outside stimuli, with O2 sats on 96% on 4 L of NC. will admit.

## 2018-06-07 NOTE — ED PROVIDER NOTE - NS ED ROS FT
CONSTITUTIONAL: No fevers, no chills  Eyes: no visual changes  Ears: no ear drainage, no ear pain  Nose: +nasal congestion  Mouth/Throat: no sore throat  Cardiovascular: No Chest pain  Respiratory: No SOB, +cough   Gastrointestinal: No n/v/d, no abd pain  Genitourinary: no dysuria, no hematuria  SKIN: no rashes.  NEURO: no headache  PSYCHIATRIC: no known mental health issues.

## 2018-06-07 NOTE — ED ADULT NURSE NOTE - OBJECTIVE STATEMENT
89y f pt arrived from [facility] via ems; emt reports pt sent for cough for 2 days; had xray yesterday and no evidence of pna found; pt aox2; aware of self and at hospital; has cough and is hypoxic on room air to 89; no resp distress; no chest pain; no abd pain; no n/v/d; pt denies fever/chills; skin intact; iv placed; labs drawn; pt medicated; pt straight cathd per md order; sterile technique used; pt tolerated well; 100ml of dark yellow urine returned; pt had flies flying around her since she arrived; flies no in room prior to pt; safety/comfort maintained

## 2018-06-07 NOTE — ED PROVIDER NOTE - ATTENDING CONTRIBUTION TO CARE
89 yo F transferred from [facility] assisted living for evaluation of her cough. patient has ah/o dementia and does not provide reliable history. she admits to rhinorrhea, nasal congestion, and cough. she denies any pain. has no complaints at this time. in the room her cough is dry. h/o afib on dig. not on oxygen at home.   rectal temp 99.8.   room air oxygen 89%.   PE lungs CTA. no resp distress. abd soft and NT 89 yo F transferred from [facility] assisted living for evaluation of her cough. patient has a h/o dementia and does not provide reliable history. she admits to rhinorrhea, nasal congestion, and cough. she denies any pain. has no complaints at this time. in the room her cough is dry. h/o afib on dig. not on oxygen at home.   rectal temp 99.8.   room air oxygen 89%.   PE lungs CTA. no resp distress. abd soft and NT  ed workup shows CXR with RLL opacity c/w atelectasis versus pna. EKG with T wave inversions, no STEMI. CTA obtained given hypoxia and tachycardia. grossly negative for PE. UA positive for infection. leukocytosis 21.1. elevated lactate.   patient given IVFs, duonebs, and empiric abx for pna with vanco/zosyn/levaquin for HCAP. on pt re-eval oxygen saturation improved, VS stable. patient admitted in stable condition    Based on patient's HPI as well as the results of today's workup, I felt that patient warranted admission to the hospital for further workup/evaluation and continued management. I discussed the findings of today's workup with the patient and addressed the patient's questions and concerns. The patient was agreeable with admission. I spoke with the hospitalist who accepted the patient for admission and subsequently took over the patient's care.

## 2018-06-07 NOTE — ED PROVIDER NOTE - OBJECTIVE STATEMENT
89 YOF PMHx of Dementia, HTN, HLD, CAD on plavix, Afib on digoxin, DNR. Pt presents with a cough and URI symptoms x 2 days, pt had a cxr done yesterday which was negative for consolidation at outside hospital. Pt denies fevers, denies SOB, denies back pain, denies chest pain.

## 2018-06-08 DIAGNOSIS — R03.0 ELEVATED BLOOD-PRESSURE READING, WITHOUT DIAGNOSIS OF HYPERTENSION: ICD-10-CM

## 2018-06-08 DIAGNOSIS — K21.9 GASTRO-ESOPHAGEAL REFLUX DISEASE WITHOUT ESOPHAGITIS: ICD-10-CM

## 2018-06-08 DIAGNOSIS — R05 COUGH: ICD-10-CM

## 2018-06-08 DIAGNOSIS — R14.0 ABDOMINAL DISTENSION (GASEOUS): ICD-10-CM

## 2018-06-08 DIAGNOSIS — F03.90 UNSPECIFIED DEMENTIA WITHOUT BEHAVIORAL DISTURBANCE: ICD-10-CM

## 2018-06-08 DIAGNOSIS — J98.19 OTHER PULMONARY COLLAPSE: ICD-10-CM

## 2018-06-08 DIAGNOSIS — Z71.89 OTHER SPECIFIED COUNSELING: ICD-10-CM

## 2018-06-08 DIAGNOSIS — N39.0 URINARY TRACT INFECTION, SITE NOT SPECIFIED: ICD-10-CM

## 2018-06-08 DIAGNOSIS — Z29.9 ENCOUNTER FOR PROPHYLACTIC MEASURES, UNSPECIFIED: ICD-10-CM

## 2018-06-08 DIAGNOSIS — I25.10 ATHEROSCLEROTIC HEART DISEASE OF NATIVE CORONARY ARTERY WITHOUT ANGINA PECTORIS: ICD-10-CM

## 2018-06-08 DIAGNOSIS — I48.91 UNSPECIFIED ATRIAL FIBRILLATION: ICD-10-CM

## 2018-06-08 DIAGNOSIS — J96.01 ACUTE RESPIRATORY FAILURE WITH HYPOXIA: ICD-10-CM

## 2018-06-08 LAB
ANION GAP SERPL CALC-SCNC: 13 MMOL/L — SIGNIFICANT CHANGE UP (ref 5–17)
BASOPHILS # BLD AUTO: 0.02 K/UL — SIGNIFICANT CHANGE UP (ref 0–0.2)
BASOPHILS NFR BLD AUTO: 0.1 % — SIGNIFICANT CHANGE UP (ref 0–2)
BUN SERPL-MCNC: 13 MG/DL — SIGNIFICANT CHANGE UP (ref 7–23)
CALCIUM SERPL-MCNC: 8.3 MG/DL — LOW (ref 8.4–10.5)
CHLORIDE SERPL-SCNC: 103 MMOL/L — SIGNIFICANT CHANGE UP (ref 96–108)
CO2 SERPL-SCNC: 23 MMOL/L — SIGNIFICANT CHANGE UP (ref 22–31)
CREAT SERPL-MCNC: 0.5 MG/DL — SIGNIFICANT CHANGE UP (ref 0.5–1.3)
EOSINOPHIL # BLD AUTO: 0.01 K/UL — SIGNIFICANT CHANGE UP (ref 0–0.5)
EOSINOPHIL NFR BLD AUTO: 0.1 % — SIGNIFICANT CHANGE UP (ref 0–6)
GLUCOSE SERPL-MCNC: 107 MG/DL — HIGH (ref 70–99)
HCT VFR BLD CALC: 42.5 % — SIGNIFICANT CHANGE UP (ref 34.5–45)
HGB BLD-MCNC: 14.1 G/DL — SIGNIFICANT CHANGE UP (ref 11.5–15.5)
IMM GRANULOCYTES NFR BLD AUTO: 0.4 % — SIGNIFICANT CHANGE UP (ref 0–1.5)
LACTATE SERPL-SCNC: 1 MMOL/L — SIGNIFICANT CHANGE UP (ref 0.7–2)
LYMPHOCYTES # BLD AUTO: 1.05 K/UL — SIGNIFICANT CHANGE UP (ref 1–3.3)
LYMPHOCYTES # BLD AUTO: 6.3 % — LOW (ref 13–44)
MAGNESIUM SERPL-MCNC: 1.7 MG/DL — SIGNIFICANT CHANGE UP (ref 1.6–2.6)
MCHC RBC-ENTMCNC: 31.3 PG — SIGNIFICANT CHANGE UP (ref 27–34)
MCHC RBC-ENTMCNC: 33.2 GM/DL — SIGNIFICANT CHANGE UP (ref 32–36)
MCV RBC AUTO: 94.2 FL — SIGNIFICANT CHANGE UP (ref 80–100)
MONOCYTES # BLD AUTO: 1.05 K/UL — HIGH (ref 0–0.9)
MONOCYTES NFR BLD AUTO: 6.3 % — SIGNIFICANT CHANGE UP (ref 2–14)
NEUTROPHILS # BLD AUTO: 14.48 K/UL — HIGH (ref 1.8–7.4)
NEUTROPHILS NFR BLD AUTO: 86.8 % — HIGH (ref 43–77)
PHOSPHATE SERPL-MCNC: 1.9 MG/DL — LOW (ref 2.5–4.5)
PLATELET # BLD AUTO: 215 K/UL — SIGNIFICANT CHANGE UP (ref 150–400)
POTASSIUM SERPL-MCNC: 3.3 MMOL/L — LOW (ref 3.5–5.3)
POTASSIUM SERPL-SCNC: 3.3 MMOL/L — LOW (ref 3.5–5.3)
RBC # BLD: 4.51 M/UL — SIGNIFICANT CHANGE UP (ref 3.8–5.2)
RBC # FLD: 15.2 % — HIGH (ref 10.3–14.5)
SODIUM SERPL-SCNC: 139 MMOL/L — SIGNIFICANT CHANGE UP (ref 135–145)
WBC # BLD: 16.67 K/UL — HIGH (ref 3.8–10.5)
WBC # FLD AUTO: 16.67 K/UL — HIGH (ref 3.8–10.5)

## 2018-06-08 PROCEDURE — 99233 SBSQ HOSP IP/OBS HIGH 50: CPT | Mod: GC

## 2018-06-08 RX ORDER — LANOLIN ALCOHOL/MO/W.PET/CERES
6 CREAM (GRAM) TOPICAL AT BEDTIME
Qty: 0 | Refills: 0 | Status: DISCONTINUED | OUTPATIENT
Start: 2018-06-08 | End: 2018-06-10

## 2018-06-08 RX ORDER — ESCITALOPRAM OXALATE 10 MG/1
10 TABLET, FILM COATED ORAL DAILY
Qty: 0 | Refills: 0 | Status: DISCONTINUED | OUTPATIENT
Start: 2018-06-08 | End: 2018-06-13

## 2018-06-08 RX ORDER — PIPERACILLIN AND TAZOBACTAM 4; .5 G/20ML; G/20ML
3.38 INJECTION, POWDER, LYOPHILIZED, FOR SOLUTION INTRAVENOUS ONCE
Qty: 0 | Refills: 0 | Status: COMPLETED | OUTPATIENT
Start: 2018-06-08 | End: 2018-06-08

## 2018-06-08 RX ORDER — ACETAMINOPHEN 500 MG
650 TABLET ORAL EVERY 6 HOURS
Qty: 0 | Refills: 0 | Status: DISCONTINUED | OUTPATIENT
Start: 2018-06-08 | End: 2018-06-13

## 2018-06-08 RX ORDER — ENOXAPARIN SODIUM 100 MG/ML
40 INJECTION SUBCUTANEOUS EVERY 24 HOURS
Qty: 0 | Refills: 0 | Status: DISCONTINUED | OUTPATIENT
Start: 2018-06-08 | End: 2018-06-13

## 2018-06-08 RX ORDER — LATANOPROST 0.05 MG/ML
1 SOLUTION/ DROPS OPHTHALMIC; TOPICAL AT BEDTIME
Qty: 0 | Refills: 0 | Status: DISCONTINUED | OUTPATIENT
Start: 2018-06-08 | End: 2018-06-13

## 2018-06-08 RX ORDER — TIMOLOL 0.5 %
1 DROPS OPHTHALMIC (EYE) AT BEDTIME
Qty: 0 | Refills: 0 | Status: DISCONTINUED | OUTPATIENT
Start: 2018-06-08 | End: 2018-06-13

## 2018-06-08 RX ORDER — ACETAMINOPHEN 500 MG
2 TABLET ORAL
Qty: 0 | Refills: 0 | COMMUNITY

## 2018-06-08 RX ORDER — LACTOBACILLUS ACIDOPHILUS 100MM CELL
1 CAPSULE ORAL DAILY
Qty: 0 | Refills: 0 | Status: DISCONTINUED | OUTPATIENT
Start: 2018-06-08 | End: 2018-06-08

## 2018-06-08 RX ORDER — POTASSIUM CHLORIDE 20 MEQ
40 PACKET (EA) ORAL ONCE
Qty: 0 | Refills: 0 | Status: COMPLETED | OUTPATIENT
Start: 2018-06-08 | End: 2018-06-08

## 2018-06-08 RX ORDER — POTASSIUM PHOSPHATE, MONOBASIC POTASSIUM PHOSPHATE, DIBASIC 236; 224 MG/ML; MG/ML
15 INJECTION, SOLUTION INTRAVENOUS ONCE
Qty: 0 | Refills: 0 | Status: COMPLETED | OUTPATIENT
Start: 2018-06-08 | End: 2018-06-08

## 2018-06-08 RX ORDER — POTASSIUM CHLORIDE 20 MEQ
20 PACKET (EA) ORAL
Qty: 0 | Refills: 0 | Status: DISCONTINUED | OUTPATIENT
Start: 2018-06-08 | End: 2018-06-08

## 2018-06-08 RX ORDER — PANTOPRAZOLE SODIUM 20 MG/1
40 TABLET, DELAYED RELEASE ORAL
Qty: 0 | Refills: 0 | Status: DISCONTINUED | OUTPATIENT
Start: 2018-06-08 | End: 2018-06-13

## 2018-06-08 RX ORDER — LACTOBACILLUS ACIDOPHILUS 100MM CELL
1 CAPSULE ORAL
Qty: 0 | Refills: 0 | Status: DISCONTINUED | OUTPATIENT
Start: 2018-06-08 | End: 2018-06-12

## 2018-06-08 RX ORDER — METOPROLOL TARTRATE 50 MG
25 TABLET ORAL
Qty: 0 | Refills: 0 | Status: DISCONTINUED | OUTPATIENT
Start: 2018-06-08 | End: 2018-06-13

## 2018-06-08 RX ORDER — DIGOXIN 250 MCG
0.12 TABLET ORAL DAILY
Qty: 0 | Refills: 0 | Status: DISCONTINUED | OUTPATIENT
Start: 2018-06-08 | End: 2018-06-13

## 2018-06-08 RX ORDER — PIPERACILLIN AND TAZOBACTAM 4; .5 G/20ML; G/20ML
3.38 INJECTION, POWDER, LYOPHILIZED, FOR SOLUTION INTRAVENOUS EVERY 8 HOURS
Qty: 0 | Refills: 0 | Status: DISCONTINUED | OUTPATIENT
Start: 2018-06-08 | End: 2018-06-12

## 2018-06-08 RX ORDER — CLOPIDOGREL BISULFATE 75 MG/1
75 TABLET, FILM COATED ORAL DAILY
Qty: 0 | Refills: 0 | Status: DISCONTINUED | OUTPATIENT
Start: 2018-06-08 | End: 2018-06-13

## 2018-06-08 RX ORDER — IPRATROPIUM/ALBUTEROL SULFATE 18-103MCG
3 AEROSOL WITH ADAPTER (GRAM) INHALATION EVERY 6 HOURS
Qty: 0 | Refills: 0 | Status: DISCONTINUED | OUTPATIENT
Start: 2018-06-08 | End: 2018-06-13

## 2018-06-08 RX ADMIN — Medication 3 MILLILITER(S): at 17:53

## 2018-06-08 RX ADMIN — Medication 3 MILLILITER(S): at 05:10

## 2018-06-08 RX ADMIN — Medication 3 MILLILITER(S): at 12:17

## 2018-06-08 RX ADMIN — Medication 3 MILLILITER(S): at 23:48

## 2018-06-08 RX ADMIN — PIPERACILLIN AND TAZOBACTAM 25 GRAM(S): 4; .5 INJECTION, POWDER, LYOPHILIZED, FOR SOLUTION INTRAVENOUS at 11:35

## 2018-06-08 RX ADMIN — POTASSIUM PHOSPHATE, MONOBASIC POTASSIUM PHOSPHATE, DIBASIC 62.5 MILLIMOLE(S): 236; 224 INJECTION, SOLUTION INTRAVENOUS at 15:55

## 2018-06-08 RX ADMIN — Medication 10 MILLIGRAM(S): at 21:44

## 2018-06-08 RX ADMIN — Medication 1 DROP(S): at 23:47

## 2018-06-08 RX ADMIN — ENOXAPARIN SODIUM 40 MILLIGRAM(S): 100 INJECTION SUBCUTANEOUS at 05:07

## 2018-06-08 RX ADMIN — Medication 1 TABLET(S): at 13:18

## 2018-06-08 RX ADMIN — Medication 40 MILLIEQUIVALENT(S): at 09:47

## 2018-06-08 RX ADMIN — PIPERACILLIN AND TAZOBACTAM 200 GRAM(S): 4; .5 INJECTION, POWDER, LYOPHILIZED, FOR SOLUTION INTRAVENOUS at 03:01

## 2018-06-08 RX ADMIN — Medication 25 MILLIGRAM(S): at 17:53

## 2018-06-08 RX ADMIN — PANTOPRAZOLE SODIUM 40 MILLIGRAM(S): 20 TABLET, DELAYED RELEASE ORAL at 09:47

## 2018-06-08 RX ADMIN — Medication 0.12 MILLIGRAM(S): at 05:10

## 2018-06-08 RX ADMIN — CLOPIDOGREL BISULFATE 75 MILLIGRAM(S): 75 TABLET, FILM COATED ORAL at 12:17

## 2018-06-08 RX ADMIN — LATANOPROST 1 DROP(S): 0.05 SOLUTION/ DROPS OPHTHALMIC; TOPICAL at 23:45

## 2018-06-08 RX ADMIN — Medication 25 MILLIGRAM(S): at 05:10

## 2018-06-08 RX ADMIN — PIPERACILLIN AND TAZOBACTAM 25 GRAM(S): 4; .5 INJECTION, POWDER, LYOPHILIZED, FOR SOLUTION INTRAVENOUS at 21:43

## 2018-06-08 RX ADMIN — ESCITALOPRAM OXALATE 10 MILLIGRAM(S): 10 TABLET, FILM COATED ORAL at 12:17

## 2018-06-08 NOTE — PROGRESS NOTE ADULT - SUBJECTIVE AND OBJECTIVE BOX
Patient is a 89y old  Female who presents with a chief complaint of Cough (2018 01:07)      Claudine Olivares MD  PGY1 - Internal Medicine  [facility] [phone] /[facility] [address]  After 7pm and on coverage days, please page Night Float pager at [phone]    SUBJECTIVE / OVERNIGHT EVENTS:      ROS:  Gen: No fever, chills, pain  Resp: No cough, SOB  CV: No chest pain, palpitations  GI: No nausea, vomiting, abdominal pain, diarrhea, constipation, melena, hematcohezia  MSK: No arthralgia, weakness, parasthesia  Skin: No rash  ROS negative unless otherwise noted    MEDICATIONS  (STANDING):  ALBUTerol/ipratropium for Nebulization 3 milliLiter(s) Nebulizer every 6 hours  clopidogrel Tablet 75 milliGRAM(s) Oral daily  digoxin     Tablet 0.125 milliGRAM(s) Oral daily  enoxaparin Injectable 40 milliGRAM(s) SubCutaneous every 24 hours  escitalopram 10 milliGRAM(s) Oral daily  lactobacillus acidophilus 1 Tablet(s) Oral daily  latanoprost 0.005% Ophthalmic Solution 1 Drop(s) Both EYES at bedtime  melatonin 6 milliGRAM(s) Oral at bedtime  metoprolol tartrate 25 milliGRAM(s) Oral two times a day  pantoprazole    Tablet 40 milliGRAM(s) Oral before breakfast  piperacillin/tazobactam IVPB. 3.375 Gram(s) IV Intermittent every 8 hours  potassium chloride    Tablet ER 40 milliEquivalent(s) Oral once  potassium phosphate IVPB 15 milliMole(s) IV Intermittent once  timolol 0.5% Solution 1 Drop(s) Left EYE at bedtime    MEDICATIONS  (PRN):  acetaminophen   Tablet. 650 milliGRAM(s) Oral every 6 hours PRN Mild to moderate pain  guaiFENesin    Syrup 100 milliGRAM(s) Oral every 6 hours PRN Cough      CAPILLARY BLOOD GLUCOSE          PHYSICAL EXAM:  T(C): 37 (18 @ 04:03), Max: 37.7 (18 @ 17:30)  HR: 85 (18 @ 04:03) (75 - 86)  BP: 180/74 (18 @ 04:03) (127/67 - 180/74)  RR: 21 (18 @ 04:03) (18 - 22)  SpO2: 93% (18 @ 04:03) (93% - 95%)  Wt(kg): --  Daily     Daily   I&O's Summary      GENERAL: NAD, well-developed  HEAD:  Atraumatic, Normocephalic  EYES: EOMI, PERRLA, conjunctiva and sclera clear  NECK: Supple, No JVD  CHEST/LUNG: Clear to auscultation bilaterally; No wheezes, rales or rhonchi   HEART: Regular rate and rhythm; No murmurs, rubs, or gallops  ABDOMEN: Soft, Nontender, Nondistended; Bowel sounds present  EXTREMITIES:  2+ Peripheral Pulses, No clubbing, cyanosis, or edema  PSYCH: AAOx3  NEUROLOGY: non-focal  SKIN: No rashes or lesions    LABS:                        16.0   21.1  )-----------( 222      ( 2018 18:51 )             47.5     06-08    139  |  103  |  13  ----------------------------<  107<H>  3.3<L>   |  23  |  0.50    Ca    8.3<L>      2018 06:22  Phos  1.9     06-08  Mg     1.7     06-08    TPro  6.7  /  Alb  3.4  /  TBili  0.9  /  DBili  x   /  AST  13  /  ALT  8<L>  /  AlkPhos  76  06-07          Urinalysis Basic - ( 2018 18:51 )    Color: Yellow / Appearance: SL Turbid / S.027 / pH: x  Gluc: x / Ketone: Trace  / Bili: Negative / Urobili: Negative   Blood: x / Protein: 100 mg/dL / Nitrite: Positive   Leuk Esterase: Large / RBC: x / WBC >50 /HPF   Sq Epi: x / Non Sq Epi: OCC /HPF / Bacteria: Many /HPF        RADIOLOGY & ADDITIONAL TESTS:    Imaging Personally Reviewed:  Consultant(s) Notes Reviewed  Care Discussed with Consultants/Other Providers Patient is a 89y old  Female who presents with a chief complaint of Cough (2018 01:07)      Claudine Olivares MD  PGY1 - Internal Medicine  [facility] [phone] /[place] [phone]  After 7pm and on coverage days, please page Night Float pager at [phone]    SUBJECTIVE / OVERNIGHT EVENTS:   89yo F with a history of dementia (A&O1-2, Afib not on AC, CAD, HLD, recurrent UTIs and Cdiff presenting with cough from [facility]. History limited as pt is a poor historian. Pt states that she has had productive cough for the past few weeks and has had SOB. Denies fevers or chills. She also has been having dysuria and intermittent abdominal pain, with no nausea or vomiting. Pt had a CXR done yesterday which reportedly showed no acute findings.    ED vitals: Temp 98.1 HR 70s /67 RR 19 O2 93% on room air, improved to 95% on 3L NC. CT angio of the chest showed complete opacification of the RLL and bronchus intermedius with near collapse of the RLL, with no evidence for PE. Labs remarkable for WBC 21.1, Hgb 16, lactate 2.7. UA positive for > 50 WBCs, many bacteria, large LE and positive nitrite. Pt was given Levaquin x1, NS 1L and Duoneb x1.      ROS:  Gen: No fever, chills, pain  Resp: No cough, SOB  CV: No chest pain, palpitations  GI: No nausea, vomiting, abdominal pain, diarrhea, constipation, melena, hematcohezia  MSK: No arthralgia, weakness, parasthesia  Skin: No rash  ROS negative unless otherwise noted    MEDICATIONS  (STANDING):  ALBUTerol/ipratropium for Nebulization 3 milliLiter(s) Nebulizer every 6 hours  clopidogrel Tablet 75 milliGRAM(s) Oral daily  digoxin     Tablet 0.125 milliGRAM(s) Oral daily  enoxaparin Injectable 40 milliGRAM(s) SubCutaneous every 24 hours  escitalopram 10 milliGRAM(s) Oral daily  lactobacillus acidophilus 1 Tablet(s) Oral daily  latanoprost 0.005% Ophthalmic Solution 1 Drop(s) Both EYES at bedtime  melatonin 6 milliGRAM(s) Oral at bedtime  metoprolol tartrate 25 milliGRAM(s) Oral two times a day  pantoprazole    Tablet 40 milliGRAM(s) Oral before breakfast  piperacillin/tazobactam IVPB. 3.375 Gram(s) IV Intermittent every 8 hours  potassium chloride    Tablet ER 40 milliEquivalent(s) Oral once  potassium phosphate IVPB 15 milliMole(s) IV Intermittent once  timolol 0.5% Solution 1 Drop(s) Left EYE at bedtime    MEDICATIONS  (PRN):  acetaminophen   Tablet. 650 milliGRAM(s) Oral every 6 hours PRN Mild to moderate pain  guaiFENesin    Syrup 100 milliGRAM(s) Oral every 6 hours PRN Cough      CAPILLARY BLOOD GLUCOSE          PHYSICAL EXAM:  T(C): 37 (18 @ 04:03), Max: 37.7 (18 @ 17:30)  HR: 85 (18 @ 04:03) (75 - 86)  BP: 180/74 (18 @ 04:03) (127/67 - 180/74)  RR: 21 (18 @ 04:03) (18 - 22)  SpO2: 93% (18 @ 04:03) (93% - 95%)  Wt(kg): --  Daily     Daily   I&O's Summary      GENERAL: NAD, upper extremity cachexia.   HEAD:  Atraumatic, Normocephalic  EYES: EOMI, PERRLA, conjunctiva and sclera clear  NECK: Supple, No JVD  CHEST/LUNG: on 3LNC  Rhonchi posteriorly to mid lung field. L base crackles.   HEART: Regular rate and rhythm; No murmurs, rubs, or gallops  ABDOMEN: Soft, Nontender, Distended; Bowel sounds present  EXTREMITIES:  2+ Peripheral Pulses, No clubbing, cyanosis, or edema  PSYCH: AAOx3  NEUROLOGY: non-focal  SKIN: 1 seborrheic keratosis on RUQ, scattered cherry hemangioma.     LABS:                        16.0   21.1  )-----------( 222      ( 2018 18:51 )             47.5     -    139  |  103  |  13  ----------------------------<  107<H>  3.3<L>   |  23  |  0.50    Ca    8.3<L>      2018 06:22  Phos  1.9     06-  Mg     1.7     06-08    TPro  6.7  /  Alb  3.4  /  TBili  0.9  /  DBili  x   /  AST  13  /  ALT  8<L>  /  AlkPhos  76  06-      Urinalysis Basic - ( 2018 18:51 )  Color: Yellow / Appearance: SL Turbid / S.027 / pH: x  Gluc: x / Ketone: Trace  / Bili: Negative / Urobili: Negative   Blood: x / Protein: 100 mg/dL / Nitrite: Positive   Leuk Esterase: Large / RBC: x / WBC >50 /HPF   Sq Epi: x / Non Sq Epi: OCC /HPF / Bacteria: Many /HPF      Digoxin Level, Serum (18 @ 18:51)    Digoxin Level, Serum: 1.0 ng/mL      RADIOLOGY & ADDITIONAL TESTS:    Imaging Personally Reviewed:  Consultant(s) Notes Reviewed  Care Discussed with Consultants/Other Providers Patient is a 89y old  Female who presents with a chief complaint of Cough (2018 01:07)      Claudine Olivares MD  PGY1 - Internal Medicine  [facility] [phone] /[place] [id number]  After 7pm and on coverage days, please page Night Float pager at [phone]    SUBJECTIVE / OVERNIGHT EVENTS:   89yo F with a history of dementia (A&O1-2 at baseline), Afib not on AC, CAD, HLD, recurrent UTIs and Cdiff p/w cough from [facility]. Pt is a poor historian. Pt states that she has had productive cough for the past few weeks and has had SOB. Denies fevers or chills. She also has been having dysuria and intermittent abdominal pain, with no nausea or vomiting. Pt had a CXR done yesterday which reportedly showed no acute findings.    ED vitals: Temp 98.1 HR 70s /67 RR 19 O2 93% on room air, improved to 95% on 3L NC. CT angio of the chest showed complete opacification of the RLL and bronchus intermedius with near collapse of the RLL, with no evidence for PE. Labs remarkable for WBC 21.1, Hgb 16, lactate 2.7. UA positive for > 50 WBCs, many bacteria, large LE and positive nitrite. Pt was given Levaquin x1, NS 1L and Duoneb x1.    ROS:  Gen: No fever, chills, pain  Resp: No SOB  CV: No chest pain, palpitations  GI: No nausea, vomiting, abdominal pain, melena, hematcohezia  MSK: No weakness, parasthesia  Skin: No rash  ROS negative unless otherwise noted    MEDICATIONS  (STANDING):  ALBUTerol/ipratropium for Nebulization 3 milliLiter(s) Nebulizer every 6 hours  clopidogrel Tablet 75 milliGRAM(s) Oral daily  digoxin     Tablet 0.125 milliGRAM(s) Oral daily  enoxaparin Injectable 40 milliGRAM(s) SubCutaneous every 24 hours  escitalopram 10 milliGRAM(s) Oral daily  lactobacillus acidophilus 1 Tablet(s) Oral daily  latanoprost 0.005% Ophthalmic Solution 1 Drop(s) Both EYES at bedtime  melatonin 6 milliGRAM(s) Oral at bedtime  metoprolol tartrate 25 milliGRAM(s) Oral two times a day  pantoprazole    Tablet 40 milliGRAM(s) Oral before breakfast  piperacillin/tazobactam IVPB. 3.375 Gram(s) IV Intermittent every 8 hours  potassium chloride    Tablet ER 40 milliEquivalent(s) Oral once  potassium phosphate IVPB 15 milliMole(s) IV Intermittent once  timolol 0.5% Solution 1 Drop(s) Left EYE at bedtime    MEDICATIONS  (PRN):  acetaminophen   Tablet. 650 milliGRAM(s) Oral every 6 hours PRN Mild to moderate pain  guaiFENesin    Syrup 100 milliGRAM(s) Oral every 6 hours PRN Cough      PHYSICAL EXAM:  T(C): 37 (18 @ 04:03), Max: 37.7 (18 @ 17:30)  HR: 85 (18 @ 04:03) (75 - 86)  BP: 180/74 (18 @ 04:03) (127/67 - 180/74)  RR: 21 (18 @ 04:03) (18 - 22)  SpO2: 93% (18 @ 04:03) (93% - 95%)  Wt(kg): --  Daily     Daily   I&O's Summary      GENERAL: NAD, upper extremity cachexia.   HEAD:  Atraumatic, Normocephalic  EYES: EOMI, PERRLA, conjunctiva and sclera clear  NECK: Supple, No JVD  CHEST/LUNG: on 3LNC  Rhonchi posteriorly to mid lung field. L base crackles.   HEART: Regular rate and rhythm; No murmurs, rubs, or gallops  ABDOMEN: Soft, Nontender, Distended; Bowel sounds present  EXTREMITIES:  2+ Peripheral Pulses, No clubbing, cyanosis, or edema  PSYCH: AAOx1-2 to name and location.   NEUROLOGY: non-focal  SKIN: 1 seborrheic keratosis on RUQ, scattered cherry hemangioma.     LABS:                        16.0   21.1  )-----------( 222      ( 2018 18:51 )             47.5     -    139  |  103  |  13  ----------------------------<  107<H>  3.3<L>   |  23  |  0.50    Ca    8.3<L>      2018 06:22  Phos  1.9     06-  Mg     1.7     -    TPro  6.7  /  Alb  3.4  /  TBili  0.9  /  DBili  x   /  AST  13  /  ALT  8<L>  /  AlkPhos  76  -      Urinalysis Basic - ( 2018 18:51 )  Color: Yellow / Appearance: SL Turbid / S.027 / pH: x  Gluc: x / Ketone: Trace  / Bili: Negative / Urobili: Negative   Blood: x / Protein: 100 mg/dL / Nitrite: Positive   Leuk Esterase: Large / RBC: x / WBC >50 /HPF   Sq Epi: x / Non Sq Epi: OCC /HPF / Bacteria: Many /HPF      Digoxin Level, Serum (18 @ 18:51)    Digoxin Level, Serum: 1.0 ng/mL      RADIOLOGY & ADDITIONAL TESTS:    Imaging Personally Reviewed:  Consultant(s) Notes Reviewed  Care Discussed with Consultants/Other Providers Patient is a 89y old  Female who presents with a chief complaint of Cough (2018 01:07)      Claudine Olivares MD  PGY1 - Internal Medicine  [facility] [phone] /[facility] [address]  After 7pm and on coverage days, please page Night Float pager at [phone]    SUBJECTIVE / OVERNIGHT EVENTS:   89yo F with a history of dementia (A&O1-2 at baseline), Afib not on AC, CAD, HLD, recurrent UTIs and Cdiff p/w cough from [facility]. Pt is a poor historian. Pt states that she has had productive cough for the past few weeks and has had SOB. Denies fevers or chills. She also has been having dysuria and intermittent abdominal pain, with no nausea or vomiting. Pt had a CXR done yesterday which reportedly showed no acute findings.    ED vitals: Temp 98.1 HR 70s /67 RR 19 O2 93% on room air, improved to 95% on 3L NC. CT angio of the chest showed complete opacification of the RLL and bronchus intermedius with near collapse of the RLL, with no evidence for PE. Labs remarkable for WBC 21.1, Hgb 16, lactate 2.7. UA positive for > 50 WBCs, many bacteria, large LE and positive nitrite. Pt was given Levaquin x1, NS 1L and Duoneb x1.      SUBJECTIVE:   seen at bedside denies complaints.    ROS:  Gen: No fever, chills, pain  Resp: No SOB  CV: No chest pain, palpitations  GI: No nausea, vomiting, abdominal pain, melena, hematcohezia  MSK: No weakness, parasthesia  Skin: No rash  ROS negative unless otherwise noted    Vital Signs Last 24 Hrs  T(C): 36.7 (2018 15:17), Max: 37 (2018 04:03)  T(F): 98 (2018 15:17), Max: 98.6 (2018 04:03)  HR: 82 (2018 15:17) (68 - 86)  BP: 154/82 (2018 15:17) (154/82 - 191/78)  BP(mean): --  RR: 18 (2018 15:17) (18 - 21)  SpO2: 93% (2018 15:17) (92% - 95%)    PHYSICAL EXAM  GENERAL: NAD, upper extremity cachexia.   HEAD:  Atraumatic, Normocephalic  EYES: EOMI, PERRLA, conjunctiva and sclera clear  NECK: Supple, No JVD  CHEST/LUNG: on 3LNC  Rhonchi posteriorly to mid lung field. L base crackles.   HEART: Regular rate and rhythm; No murmurs, rubs, or gallops  ABDOMEN: Soft, Nontender, Distended, tympanic, no rebound/guarding; Bowel sounds present  EXTREMITIES:  2+ Peripheral Pulses, No clubbing, cyanosis, or edema  PSYCH: AAOx1-2 to name and location ( baseline)  NEUROLOGY: non-focal  SKIN: 1 seborrheic keratosis on RUQ, scattered cherry hemangioma.     LABS:                        16.0   21.1  )-----------( 222      ( 2018 18:51 )             47.5     06-08    139  |  103  |  13  ----------------------------<  107<H>  3.3<L>   |  23  |  0.50    Ca    8.3<L>      2018 06:22  Phos  1.9     06-08  Mg     1.7     06-08    TPro  6.7  /  Alb  3.4  /  TBili  0.9  /  DBili  x   /  AST  13  /  ALT  8<L>  /  AlkPhos  76  06-07      Urinalysis Basic - ( 2018 18:51 )  Color: Yellow / Appearance: SL Turbid / S.027 / pH: x  Gluc: x / Ketone: Trace  / Bili: Negative / Urobili: Negative   Blood: x / Protein: 100 mg/dL / Nitrite: Positive   Leuk Esterase: Large / RBC: x / WBC >50 /HPF   Sq Epi: x / Non Sq Epi: OCC /HPF / Bacteria: Many /HPF      Digoxin Level, Serum (18 @ 18:51)    Digoxin Level, Serum: 1.0 ng/mL      MEDICATIONS  (STANDING):  ALBUTerol/ipratropium for Nebulization 3 milliLiter(s) Nebulizer every 6 hours  bisacodyl Suppository 10 milliGRAM(s) Rectal daily  clopidogrel Tablet 75 milliGRAM(s) Oral daily  digoxin     Tablet 0.125 milliGRAM(s) Oral daily  enoxaparin Injectable 40 milliGRAM(s) SubCutaneous every 24 hours  escitalopram 10 milliGRAM(s) Oral daily  lactobacillus acidophilus 1 Tablet(s) Oral three times a day with meals  latanoprost 0.005% Ophthalmic Solution 1 Drop(s) Both EYES at bedtime  melatonin 6 milliGRAM(s) Oral at bedtime  metoprolol tartrate 25 milliGRAM(s) Oral two times a day  pantoprazole    Tablet 40 milliGRAM(s) Oral before breakfast  piperacillin/tazobactam IVPB. 3.375 Gram(s) IV Intermittent every 8 hours  timolol 0.5% Solution 1 Drop(s) Left EYE at bedtime    MEDICATIONS  (PRN):  acetaminophen   Tablet. 650 milliGRAM(s) Oral every 6 hours PRN Mild to moderate pain  guaiFENesin    Syrup 100 milliGRAM(s) Oral every 6 hours PRN Cough

## 2018-06-08 NOTE — PROGRESS NOTE ADULT - ASSESSMENT
89yo F with a history of dementia, Afib not on AC, CAD, HLD, recurrent UTIs and Cdiff presenting with hypoxic respiratory failure 2/2 aspiration pneumonia c/b UTI 89yo F from [facility] with a PMH of dementia (A&O 1-2 at baseline), Afib not on AC, CAD, HLD, recurrent UTIs and recurrent Cdiff p/w hypoxic respiratory failure 2/2 aspiration pneumonia c/b UTI now on zosyn. 89 yo F from [facility] with PMH of dementia (A&O 1-2 at baseline), Afib not on AC, CAD, HLD, recurrent UTIs and recurrent Cdiff p/w cough/sob adm for acute hypoxic respiratory failure 2/2 aspiration pneumonia c/b UTI on zosyn.

## 2018-06-08 NOTE — PROGRESS NOTE ADULT - PROBLEM SELECTOR PLAN 3
Pt currently in NSR, rate controlled  - C/w lopressor with hold parameters  - C/w digoxin Pt currently in NSR, rate controlled  - C/w lopressor with hold parameters  - C/w digoxin (dig level wnl) suspect constipation, r/o SBO  - check KUB  - start bisacodyl for now

## 2018-06-08 NOTE — PROGRESS NOTE ADULT - PROBLEM SELECTOR PLAN 4
C/w metoprolol, Plavix no formal dx of HTN, however w/ elevated SBP 180s-190s, asymptomatic  - c/w home metoprolol  - monitor bp, if needed can add amlodipine

## 2018-06-08 NOTE — PROGRESS NOTE ADULT - PROBLEM SELECTOR PLAN 1
Pt presenting with weeks of worsening cough, with CT findings of near complete collapse of RLL and opacification of RLL and bronchus intermedius; may be 2/2 mucous plugging, lung mass in the bronchus or post-obstructive pneumonia  - Start Zosyn to cover for possible aspiration pneumonia  - Chest PT q6h for mucous plugging  - Check sputum culture  - Duoneb q6h  - Continue supplemental oxygen PRN  - Robitussin PRN  - Speech and swallow evaluation  - Aspiration precaution  - Incentive spirometry limited as patient likely to not comply. Pt presenting with weeks of worsening cough, with CT findings of near complete collapse of RLL and opacification of RLL and bronchus intermedius; may be 2/2 mucous plugging, lung mass in the bronchus or post-obstructive pneumonia  - c/w Zosyn to cover for possible aspiration pneumonia  - Chest PT q6h for mucous plugging  - Check sputum culture  - Duoneb q6h  - Continue supplemental oxygen PRN  - Robitussin PRN  - Aspiration precaution  - Incentive spirometry limited as patient likely to not comply.  - f/u BCx. Pt presenting with weeks of worsening cough, with CT findings of near complete collapse of RLL and opacification of RLL and bronchus intermedius; may be 2/2 mucous plugging, lung mass in the bronchus or post-obstructive pneumonia  - c/w Zosyn to cover for aspiration pneumonia 6/7-  - lactobacillus while on abx given hx of c diff  - Chest PT q6h for mucous plugging, chest vest atc  - Check sputum culture  - Duoneb q6h  - Continue supplemental oxygen PRN - deescalate as tolerates  - Robitussin PRN  - Aspiration precaution  - Incentive spirometry limited as patient likely to not comply.  - f/u BCx  - speech and swallow eval

## 2018-06-08 NOTE — PROGRESS NOTE ADULT - PROBLEM SELECTOR PLAN 5
Enhanced supervision  - Reorient as needed Enhanced supervision  - Reorient as needed   - QTc 6/7 is 477 At baseline this morning.   - Reorient as needed   - QTc 6/7 is 477 Pt currently in NSR, rate controlled  not on a/c at home  - C/w lopressor with hold parameters  - C/w digoxin (dig level 1)  - f/u with outpt cardiologist regarding a/c

## 2018-06-08 NOTE — PROGRESS NOTE ADULT - PROBLEM SELECTOR PLAN 2
UA grossly positive, pt with previous UTIs, last culture in Feb 2018 grew E. coli sensitive to Ceftriaxone but will do zosyn to cover aspiration pneumonia.   - C/w Zosyn  - F/u urine cultures UA grossly positive, pt with previous UTIs, last culture in Feb 2018 grew E. coli sensitive to Ceftriaxone but will do zosyn to cover aspiration pneumonia.   - C/w Zosyn  - F/u urine cultures  - trend lactic acidosis

## 2018-06-08 NOTE — H&P ADULT - PROBLEM SELECTOR PLAN 3
UA grossly positive, pt with previous UTIs, last culture in Feb 2018 grew E. coli sensitive to Ceftriaxone  - C/w Ceftriaxone  - F/u urine cultures UA grossly positive, pt with previous UTIs, last culture in Feb 2018 grew E. coli sensitive to Ceftriaxone  - C/w Zosyn  - F/u urine cultures Pt currently in NSR, rate controlled  - C/w lopressor with hold parameters  - C/w digoxin

## 2018-06-08 NOTE — H&P ADULT - HISTORY OF PRESENT ILLNESS
Pt is a 89yo F with a history of dementia, Afib not on AC, CAD, HLD, recurrent UTIs and Cdiff presenting with cough from [facility]. History limited as pt is a poor historian. Pt states that she has had productive cough for the past few weeks and has had SOB. Denies fevers or chills. She also has been having dysuria and intermittent abdominal pain, with no nausea or vomiting.     ED vitals: Temp 98.1 HR 70s /67 RR 19 O2 93% on room air, improved to 95% on 3L NC. Pt is a 89yo F with a history of dementia, Afib not on AC, CAD, HLD, recurrent UTIs and Cdiff presenting with cough from [facility]. History limited as pt is a poor historian. Pt states that she has had productive cough for the past few weeks and has had SOB. Denies fevers or chills. She also has been having dysuria and intermittent abdominal pain, with no nausea or vomiting.     ED vitals: Temp 98.1 HR 70s /67 RR 19 O2 93% on room air, improved to 95% on 3L NC. CT angio of the chest showed complete opacification of the RLL and bronchus intermedius with near collapse of the RLL, with no evidence for PE. Labs remarkable for WBC 21.1, Hgb 16, lactate 2.7. UA positive for > 50 WBCs, many bacteria, large LE and positive nitrite. Pt was given Levaquin x1, NS 1L and Duoneb x1. Pt is a 89yo F with a history of dementia, Afib not on AC, CAD, HLD, recurrent UTIs and Cdiff presenting with cough from [facility]. History limited as pt is a poor historian. Pt states that she has had productive cough for the past few weeks and has had SOB. Denies fevers or chills. She also has been having dysuria and intermittent abdominal pain, with no nausea or vomiting. Pt had a CXR done yesterday which reportedly showed no acute findings.    ED vitals: Temp 98.1 HR 70s /67 RR 19 O2 93% on room air, improved to 95% on 3L NC. CT angio of the chest showed complete opacification of the RLL and bronchus intermedius with near collapse of the RLL, with no evidence for PE. Labs remarkable for WBC 21.1, Hgb 16, lactate 2.7. UA positive for > 50 WBCs, many bacteria, large LE and positive nitrite. Pt was given Levaquin x1, NS 1L and Duoneb x1.

## 2018-06-08 NOTE — H&P ADULT - NSHPLABSRESULTS_GEN_ALL_CORE
16.0   21.1  )-----------( 222      ( 2018 18:51 )             47.5       137  |  100  |  18  ----------------------------<  131<H>  4.1   |  23  |  0.60    Ca    9.2      2018 18:51    TPro  6.7  /  Alb  3.4  /  TBili  0.9  /  DBili  x   /  AST  13  /  ALT  8<L>  /  AlkPhos  76      VB.42/41/36/26    Lactate 2.7    EKG personally reviewed: NSR, rate 70, , RBBB, no ST changes    Urinalysis Basic - ( 2018 18:51 )    Color: Yellow / Appearance: SL Turbid / S.027 / pH: x  Gluc: x / Ketone: Trace  / Bili: Negative / Urobili: Negative   Blood: x / Protein: 100 mg/dL / Nitrite: Positive   Leuk Esterase: Large / RBC: x / WBC >50 /HPF   Sq Epi: x / Non Sq Epi: OCC /HPF / Bacteria: Many /HPF    < from: CT Angio Chest w/ IV Cont (18 @ 20:13) >    IMPRESSION:   Suboptimal evaluation for PEin the subsegmental arteries due to   respiratory motion artifact. Otherwise, no PE.   Opacification of bronchus intermedius and right lower lobe airway with   resultant near complete collapse of right lower lobe. 16.0   21.1  )-----------( 222      ( 2018 18:51 )             47.5       137  |  100  |  18  ----------------------------<  131<H>  4.1   |  23  |  0.60    Ca    9.2      2018 18:51    TPro  6.7  /  Alb  3.4  /  TBili  0.9  /  DBili  x   /  AST  13  /  ALT  8<L>  /  AlkPhos  76      VB.42/41/36/26    Lactate 2.7    EKG personally reviewed: NSR, rate 70, , RBBB, no ST changes    Urinalysis Basic - ( 2018 18:51 )    Color: Yellow / Appearance: SL Turbid / S.027 / pH: x  Gluc: x / Ketone: Trace  / Bili: Negative / Urobili: Negative   Blood: x / Protein: 100 mg/dL / Nitrite: Positive   Leuk Esterase: Large / RBC: x / WBC >50 /HPF   Sq Epi: x / Non Sq Epi: OCC /HPF / Bacteria: Many /HPF    < from: CT Angio Chest w/ IV Cont (18 @ 20:13) >         I personally reviewed & interpreted the lab findings above; CBC c/w leukocytosis. Mild lactate elevation   I personally reviewed & interpreted the radiographic findings; CXR c/w right lung atelectasis   I personally reviewed & interpreted the EKG findings; No active ischemia; RBBB

## 2018-06-08 NOTE — H&P ADULT - PROBLEM SELECTOR PROBLEM 4
Atrial fibrillation, unspecified type Coronary artery disease involving native coronary artery of native heart without angina pectoris

## 2018-06-08 NOTE — H&P ADULT - GASTROINTESTINAL DETAILS
no masses palpable/no rigidity/no guarding/no distention/nontender/bowel sounds normal/no rebound tenderness/soft

## 2018-06-08 NOTE — H&P ADULT - PROBLEM SELECTOR PROBLEM 6
Dementia without behavioral disturbance, unspecified dementia type Gastroesophageal reflux disease, esophagitis presence not specified

## 2018-06-08 NOTE — H&P ADULT - NSHPPHYSICALEXAM_GEN_ALL_CORE
Vital Signs Last 24 Hrs  T(C): 36.7 (07 Jun 2018 22:51), Max: 37.7 (07 Jun 2018 17:30)  T(F): 98.1 (07 Jun 2018 22:51), Max: 99.8 (07 Jun 2018 17:30)  HR: 86 (07 Jun 2018 22:51) (75 - 86)  BP: 173/82 (07 Jun 2018 22:51) (127/67 - 173/82)  BP(mean): --  RR: 18 (07 Jun 2018 22:51) (18 - 22)  SpO2: 93% (07 Jun 2018 22:51) (93% - 95%)

## 2018-06-08 NOTE — H&P ADULT - PROBLEM SELECTOR PLAN 4
Pt currently in NSR, rate controlled  - C/w lopressor with hold parameters  - C/w digoxin C/w metoprolol, Plavix

## 2018-06-08 NOTE — PROGRESS NOTE ADULT - PROBLEM SELECTOR PLAN 9
DVT ppx: Lovenox  Diet: Mechanical soft  PT consult  B/L adrenal gland thickening on imaging - monitor as outpt  Dispo: Likely back to [facility]

## 2018-06-08 NOTE — H&P ADULT - PROBLEM SELECTOR PROBLEM 5
Coronary artery disease involving native coronary artery of native heart without angina pectoris Dementia without behavioral disturbance, unspecified dementia type

## 2018-06-08 NOTE — H&P ADULT - RS GEN PE MLT RESP DETAILS PC
airway patent/respirations non-labored/diminished breath sounds, R/no rales/rhonchi/good air movement

## 2018-06-08 NOTE — H&P ADULT - ASSESSMENT
89yo F with a history of dementia, Afib not on AC, CAD, HLD, recurrent UTIs and Cdiff presenting with cough, found to have near complete atelectasis of the RLL with possible mucous plugging and pneumonia. 89yo F with a history of dementia, Afib not on AC, CAD, HLD, recurrent UTIs and Cdiff presenting with hypoxic respiratory failure 2/2 aspiration pneumonia c/b UTI

## 2018-06-08 NOTE — PROGRESS NOTE ADULT - PROBLEM SELECTOR PROBLEM 4
Coronary artery disease involving native coronary artery of native heart without angina pectoris Elevated blood pressure reading

## 2018-06-08 NOTE — PROGRESS NOTE ADULT - PROBLEM SELECTOR PLAN 7
-Spent 20-minutes total time discussing advance directive measures with daughter Mahi Kirkpatrick who is listed as health care proxy on forms from assisted living facility. Mahi reinstates patient is DNR/ DNI given patient unable to express her wishes. Emotional support provided. Questions answered. DNR/DNI form in chart. Order placed in [place]. -Spent 20-minutes total time discussing advance directive measures with daughter Mahi Kirkpatrick who is listed as health care proxy on forms from assisted living facility. Mahi reinstates patient is DNR/DNI given patient unable to express her wishes. Emotional support provided. Questions answered. DNR/DNI form in chart. Order placed in [place]. At baseline this morning, [id number].  - Reorient as needed   - QTc 6/7 is 477

## 2018-06-08 NOTE — PROGRESS NOTE ADULT - PROBLEM SELECTOR PLAN 8
DVT ppx: Lovenox  Diet: Mechanical soft  Dispo: Likely back to [facility] DVT ppx: Lovenox  Diet: Mechanical soft  Dispo: Likely back to [facility]. PT consult. daughter Mahi Kirkpatrick who is listed as health care proxy on forms from assisted living facility. DNR/DNI given patient unable to express her wishes.

## 2018-06-08 NOTE — H&P ADULT - NSHPSOCIALHISTORY_GEN_ALL_CORE
Pt is a resident of [facility]; unable to obtain further history Pt is a resident of [facility]; unable to obtain further history given adv dementia

## 2018-06-08 NOTE — H&P ADULT - PROBLEM SELECTOR PLAN 1
Pt presenting with weeks of worsening cough, with CT findings of near complete collapse of RLL and opacification of RLL and bronchus intermedius; may be 2/2 mucous plugging, lung mass in the bronchus or post-obstructive pneumonia  - Start Zosyn to cover for possible aspiration pneumonia  - Chest PT q6h for mucous plugging  - Check sputum culture  - Duoneb q6h  - Continue supplemental oxygen PRN  - Robitussin PRN  - Speech and swallow evaluation Pt presenting with weeks of worsening cough, with CT findings of near complete collapse of RLL and opacification of RLL and bronchus intermedius; may be 2/2 mucous plugging, lung mass in the bronchus or post-obstructive pneumonia  - Start Zosyn to cover for possible aspiration pneumonia  - Chest PT q6h for mucous plugging  - Check sputum culture  - Duoneb q6h  - Continue supplemental oxygen PRN  - Robitussin PRN  - Speech and swallow evaluation  - Aspiration precaution  - Incentive spirometry limited as patient likely to not comply.

## 2018-06-08 NOTE — H&P ADULT - PMH
Atrial fibrillation, unspecified type    Clostridium difficile diarrhea    Coronary artery disease involving native coronary artery of native heart without angina pectoris    Dementia    GERD (gastroesophageal reflux disease)    HTN (hypertension)    Hyperlipidemia, unspecified hyperlipidemia type

## 2018-06-08 NOTE — H&P ADULT - PROBLEM SELECTOR PLAN 7
C/w Protonix -Spent 20-minutes total time discussing advance directive measures with daughter Mahi Kirkpatrick who is listed as health care proxy on forms from assisted living facility. Mahi reinstates patient is DNR/ DNI given patient unable to express her needs. -Spent 20-minutes total time discussing advance directive measures with daughter Mahi Kirkpatrick who is listed as health care proxy on forms from assisted living facility. Mahi reinstates patient is DNR/ DNI given patient unable to express her needs. Emotional support provided. Questions answered. DNR/DNI form in chart. Order placed in [place]. -Spent 20-minutes total time discussing advance directive measures with daughter Mahi Kirkpatrick who is listed as health care proxy on forms from assisted living facility. Mahi reinstates patient is DNR/ DNI given patient unable to express her wishes. Emotional support provided. Questions answered. DNR/DNI form in chart. Order placed in [place].

## 2018-06-08 NOTE — H&P ADULT - ATTENDING COMMENTS
Patient assigned to me by night hospitalist in charge for management and care for patient for this evening only. Care to be resumed by day hospitalist in the morning and thereafter.   Patient seen and examined at bedside. Agree with the resident note above. Changes made to note above where appropriate. Doubt the etiology of WBC is abdominal in origin. Abdomen is soft, no facial grimace on deep palpation. However if WBC keeps uptrending or not improving can consider CT abdomen to evaluate for fecal impaction, which could potentially lead to UTI.

## 2018-06-08 NOTE — H&P ADULT - PROBLEM SELECTOR PLAN 2
Continue with chest PT, antibiotics, will need repeat CT chest imaging to evaluate for improvement in atelectasis  - Monitor oxygen saturation closely UA grossly positive, pt with previous UTIs, last culture in Feb 2018 grew E. coli sensitive to Ceftriaxone but will do zosyn to cover aspiration pneumonia.   - C/w Zosyn  - F/u urine cultures

## 2018-06-08 NOTE — PROGRESS NOTE ADULT - PROBLEM SELECTOR PROBLEM 6
Gastroesophageal reflux disease, esophagitis presence not specified Coronary artery disease involving native coronary artery of native heart without angina pectoris

## 2018-06-09 LAB
-  AMIKACIN: SIGNIFICANT CHANGE UP
-  AMOXICILLIN/CLAVULANIC ACID: SIGNIFICANT CHANGE UP
-  AMPICILLIN/SULBACTAM: SIGNIFICANT CHANGE UP
-  AMPICILLIN: SIGNIFICANT CHANGE UP
-  AZTREONAM: SIGNIFICANT CHANGE UP
-  CEFAZOLIN: SIGNIFICANT CHANGE UP
-  CEFEPIME: SIGNIFICANT CHANGE UP
-  CEFOXITIN: SIGNIFICANT CHANGE UP
-  CEFTRIAXONE: SIGNIFICANT CHANGE UP
-  CIPROFLOXACIN: SIGNIFICANT CHANGE UP
-  ERTAPENEM: SIGNIFICANT CHANGE UP
-  GENTAMICIN: SIGNIFICANT CHANGE UP
-  IMIPENEM: SIGNIFICANT CHANGE UP
-  LEVOFLOXACIN: SIGNIFICANT CHANGE UP
-  MEROPENEM: SIGNIFICANT CHANGE UP
-  NITROFURANTOIN: SIGNIFICANT CHANGE UP
-  PIPERACILLIN/TAZOBACTAM: SIGNIFICANT CHANGE UP
-  TIGECYCLINE: SIGNIFICANT CHANGE UP
-  TOBRAMYCIN: SIGNIFICANT CHANGE UP
-  TRIMETHOPRIM/SULFAMETHOXAZOLE: SIGNIFICANT CHANGE UP
ALBUMIN SERPL ELPH-MCNC: 2.8 G/DL — LOW (ref 3.3–5)
ALP SERPL-CCNC: 65 U/L — SIGNIFICANT CHANGE UP (ref 40–120)
ALT FLD-CCNC: 6 U/L — LOW (ref 10–45)
ANION GAP SERPL CALC-SCNC: 11 MMOL/L — SIGNIFICANT CHANGE UP (ref 5–17)
AST SERPL-CCNC: 9 U/L — LOW (ref 10–40)
BASOPHILS # BLD AUTO: 0.02 K/UL — SIGNIFICANT CHANGE UP (ref 0–0.2)
BASOPHILS NFR BLD AUTO: 0.1 % — SIGNIFICANT CHANGE UP (ref 0–2)
BILIRUB SERPL-MCNC: 0.9 MG/DL — SIGNIFICANT CHANGE UP (ref 0.2–1.2)
BUN SERPL-MCNC: 10 MG/DL — SIGNIFICANT CHANGE UP (ref 7–23)
CALCIUM SERPL-MCNC: 8.2 MG/DL — LOW (ref 8.4–10.5)
CHLORIDE SERPL-SCNC: 105 MMOL/L — SIGNIFICANT CHANGE UP (ref 96–108)
CO2 SERPL-SCNC: 24 MMOL/L — SIGNIFICANT CHANGE UP (ref 22–31)
CREAT SERPL-MCNC: 0.53 MG/DL — SIGNIFICANT CHANGE UP (ref 0.5–1.3)
CULTURE RESULTS: SIGNIFICANT CHANGE UP
EOSINOPHIL # BLD AUTO: 0.12 K/UL — SIGNIFICANT CHANGE UP (ref 0–0.5)
EOSINOPHIL NFR BLD AUTO: 0.8 % — SIGNIFICANT CHANGE UP (ref 0–6)
GLUCOSE SERPL-MCNC: 111 MG/DL — HIGH (ref 70–99)
HCT VFR BLD CALC: 42.4 % — SIGNIFICANT CHANGE UP (ref 34.5–45)
HGB BLD-MCNC: 13.9 G/DL — SIGNIFICANT CHANGE UP (ref 11.5–15.5)
IMM GRANULOCYTES NFR BLD AUTO: 0.3 % — SIGNIFICANT CHANGE UP (ref 0–1.5)
LACTATE SERPL-SCNC: 1 MMOL/L — SIGNIFICANT CHANGE UP (ref 0.7–2)
LYMPHOCYTES # BLD AUTO: 0.86 K/UL — LOW (ref 1–3.3)
LYMPHOCYTES # BLD AUTO: 5.5 % — LOW (ref 13–44)
MAGNESIUM SERPL-MCNC: 1.9 MG/DL — SIGNIFICANT CHANGE UP (ref 1.6–2.6)
MCHC RBC-ENTMCNC: 30.5 PG — SIGNIFICANT CHANGE UP (ref 27–34)
MCHC RBC-ENTMCNC: 32.8 GM/DL — SIGNIFICANT CHANGE UP (ref 32–36)
MCV RBC AUTO: 93.2 FL — SIGNIFICANT CHANGE UP (ref 80–100)
METHOD TYPE: SIGNIFICANT CHANGE UP
MONOCYTES # BLD AUTO: 1.15 K/UL — HIGH (ref 0–0.9)
MONOCYTES NFR BLD AUTO: 7.4 % — SIGNIFICANT CHANGE UP (ref 2–14)
NEUTROPHILS # BLD AUTO: 13.4 K/UL — HIGH (ref 1.8–7.4)
NEUTROPHILS NFR BLD AUTO: 85.9 % — HIGH (ref 43–77)
ORGANISM # SPEC MICROSCOPIC CNT: SIGNIFICANT CHANGE UP
ORGANISM # SPEC MICROSCOPIC CNT: SIGNIFICANT CHANGE UP
PHOSPHATE SERPL-MCNC: 2.7 MG/DL — SIGNIFICANT CHANGE UP (ref 2.5–4.5)
PLATELET # BLD AUTO: 247 K/UL — SIGNIFICANT CHANGE UP (ref 150–400)
POTASSIUM SERPL-MCNC: 3.6 MMOL/L — SIGNIFICANT CHANGE UP (ref 3.5–5.3)
POTASSIUM SERPL-SCNC: 3.6 MMOL/L — SIGNIFICANT CHANGE UP (ref 3.5–5.3)
PROT SERPL-MCNC: 5.8 G/DL — LOW (ref 6–8.3)
RBC # BLD: 4.55 M/UL — SIGNIFICANT CHANGE UP (ref 3.8–5.2)
RBC # FLD: 15.4 % — HIGH (ref 10.3–14.5)
SODIUM SERPL-SCNC: 140 MMOL/L — SIGNIFICANT CHANGE UP (ref 135–145)
SPECIMEN SOURCE: SIGNIFICANT CHANGE UP
WBC # BLD: 15.59 K/UL — HIGH (ref 3.8–10.5)
WBC # FLD AUTO: 15.59 K/UL — HIGH (ref 3.8–10.5)

## 2018-06-09 PROCEDURE — 99233 SBSQ HOSP IP/OBS HIGH 50: CPT | Mod: GC

## 2018-06-09 PROCEDURE — 74018 RADEX ABDOMEN 1 VIEW: CPT | Mod: 26

## 2018-06-09 RX ORDER — ONDANSETRON 8 MG/1
4 TABLET, FILM COATED ORAL EVERY 8 HOURS
Qty: 0 | Refills: 0 | Status: DISCONTINUED | OUTPATIENT
Start: 2018-06-09 | End: 2018-06-13

## 2018-06-09 RX ORDER — AMLODIPINE BESYLATE 2.5 MG/1
5 TABLET ORAL DAILY
Qty: 0 | Refills: 0 | Status: DISCONTINUED | OUTPATIENT
Start: 2018-06-09 | End: 2018-06-13

## 2018-06-09 RX ADMIN — Medication 1 TABLET(S): at 09:20

## 2018-06-09 RX ADMIN — AMLODIPINE BESYLATE 5 MILLIGRAM(S): 2.5 TABLET ORAL at 13:44

## 2018-06-09 RX ADMIN — Medication 6 MILLIGRAM(S): at 00:45

## 2018-06-09 RX ADMIN — Medication 1 DROP(S): at 21:16

## 2018-06-09 RX ADMIN — PIPERACILLIN AND TAZOBACTAM 25 GRAM(S): 4; .5 INJECTION, POWDER, LYOPHILIZED, FOR SOLUTION INTRAVENOUS at 21:14

## 2018-06-09 RX ADMIN — Medication 25 MILLIGRAM(S): at 05:52

## 2018-06-09 RX ADMIN — Medication 1 TABLET(S): at 11:35

## 2018-06-09 RX ADMIN — ENOXAPARIN SODIUM 40 MILLIGRAM(S): 100 INJECTION SUBCUTANEOUS at 05:52

## 2018-06-09 RX ADMIN — Medication 3 MILLILITER(S): at 17:02

## 2018-06-09 RX ADMIN — Medication 10 MILLIGRAM(S): at 11:31

## 2018-06-09 RX ADMIN — CLOPIDOGREL BISULFATE 75 MILLIGRAM(S): 75 TABLET, FILM COATED ORAL at 11:35

## 2018-06-09 RX ADMIN — Medication 3 MILLILITER(S): at 05:52

## 2018-06-09 RX ADMIN — Medication 1 TABLET(S): at 17:01

## 2018-06-09 RX ADMIN — LATANOPROST 1 DROP(S): 0.05 SOLUTION/ DROPS OPHTHALMIC; TOPICAL at 21:17

## 2018-06-09 RX ADMIN — ESCITALOPRAM OXALATE 10 MILLIGRAM(S): 10 TABLET, FILM COATED ORAL at 11:35

## 2018-06-09 RX ADMIN — PANTOPRAZOLE SODIUM 40 MILLIGRAM(S): 20 TABLET, DELAYED RELEASE ORAL at 05:52

## 2018-06-09 RX ADMIN — Medication 25 MILLIGRAM(S): at 17:01

## 2018-06-09 RX ADMIN — Medication 6 MILLIGRAM(S): at 21:14

## 2018-06-09 RX ADMIN — PIPERACILLIN AND TAZOBACTAM 25 GRAM(S): 4; .5 INJECTION, POWDER, LYOPHILIZED, FOR SOLUTION INTRAVENOUS at 13:44

## 2018-06-09 RX ADMIN — Medication 100 MILLIGRAM(S): at 05:55

## 2018-06-09 RX ADMIN — Medication 0.12 MILLIGRAM(S): at 05:52

## 2018-06-09 RX ADMIN — PIPERACILLIN AND TAZOBACTAM 25 GRAM(S): 4; .5 INJECTION, POWDER, LYOPHILIZED, FOR SOLUTION INTRAVENOUS at 05:51

## 2018-06-09 RX ADMIN — Medication 3 MILLILITER(S): at 11:35

## 2018-06-09 NOTE — PROGRESS NOTE ADULT - PROBLEM SELECTOR PLAN 8
daughter Mahi Kirkpatrick who is listed as health care proxy on forms from assisted living facility. DNR/DNI given patient unable to express her wishes.

## 2018-06-09 NOTE — PROGRESS NOTE ADULT - PROBLEM SELECTOR PLAN 5
Pt currently in NSR, rate controlled  not on a/c at home  - C/w lopressor with hold parameters  - C/w digoxin (dig level 1)  - f/u with outpt cardiologist regarding a/c

## 2018-06-09 NOTE — PROGRESS NOTE ADULT - PROBLEM SELECTOR PLAN 4
no formal dx of HTN, however w/ elevated SBP 180s-190s, asymptomatic  - c/w home metoprolol  - monitor bp, if needed can add amlodipine no formal dx of HTN, however w/ elevated SBP 180s-190s, asymptomatic  - c/w home metoprolol  - add amlodipine 5 today

## 2018-06-09 NOTE — PROVIDER CONTACT NOTE (OTHER) - ACTION/TREATMENT ORDERED:
Monitor for fever & WBC tomorrow morning. If either of the 2 occur, MD to order c-diff specimen collection.

## 2018-06-09 NOTE — PROGRESS NOTE ADULT - PROBLEM SELECTOR PLAN 3
suspect constipation, r/o SBO  - check KUB  - start bisacodyl for now suspect constipation, r/o SBO. KUB without any evidence of obstruction to my eye.   abd still soft to palpation.   - start bisacodyl for now  - will add tap water enema   - f/u official read.   - monitor for bm.

## 2018-06-09 NOTE — PROGRESS NOTE ADULT - ASSESSMENT
89 yo F from [facility] with PMH of dementia (A&O 1-2 at baseline), Afib not on AC, CAD, HLD, recurrent UTIs and recurrent Cdiff p/w cough/sob adm for acute hypoxic respiratory failure 2/2 aspiration pneumonia c/b UTI on zosyn.

## 2018-06-09 NOTE — PROGRESS NOTE ADULT - SUBJECTIVE AND OBJECTIVE BOX
Patient is a 89y old  Female who presents with a chief complaint of Cough (2018 01:07)      Claudine Olivares MD  PGY1 - Internal Medicine  [facility] [phone] /[facility] [id number]  After 7pm and on coverage days, please page Night Float pager at [phone]    SUBJECTIVE / OVERNIGHT EVENTS:      ROS:  Gen: No fever, chills, pain  Resp: No cough, SOB  CV: No chest pain, palpitations  GI: No nausea, vomiting, abdominal pain, diarrhea, constipation, melena, hematcohezia  MSK: No arthralgia, weakness, parasthesia  Skin: No rash  ROS negative unless otherwise noted    MEDICATIONS  (STANDING):  ALBUTerol/ipratropium for Nebulization 3 milliLiter(s) Nebulizer every 6 hours  bisacodyl Suppository 10 milliGRAM(s) Rectal daily  clopidogrel Tablet 75 milliGRAM(s) Oral daily  digoxin     Tablet 0.125 milliGRAM(s) Oral daily  enoxaparin Injectable 40 milliGRAM(s) SubCutaneous every 24 hours  escitalopram 10 milliGRAM(s) Oral daily  lactobacillus acidophilus 1 Tablet(s) Oral three times a day with meals  latanoprost 0.005% Ophthalmic Solution 1 Drop(s) Both EYES at bedtime  melatonin 6 milliGRAM(s) Oral at bedtime  metoprolol tartrate 25 milliGRAM(s) Oral two times a day  pantoprazole    Tablet 40 milliGRAM(s) Oral before breakfast  piperacillin/tazobactam IVPB. 3.375 Gram(s) IV Intermittent every 8 hours  timolol 0.5% Solution 1 Drop(s) Left EYE at bedtime    MEDICATIONS  (PRN):  acetaminophen   Tablet. 650 milliGRAM(s) Oral every 6 hours PRN Mild to moderate pain  guaiFENesin    Syrup 100 milliGRAM(s) Oral every 6 hours PRN Cough      CAPILLARY BLOOD GLUCOSE          PHYSICAL EXAM:  T(C): 36.6 (18 @ 20:50), Max: 36.8 (18 @ 12:20)  HR: 78 (18 @ 23:53) (68 - 82)  BP: 175/79 (18 @ 06:00) (154/82 - 191/78)  RR: 18 (18 @ 23:53) (18 - 20)  SpO2: 95% (18 @ 23:53) (92% - 95%)  Wt(kg): --  Daily     Daily   I&O's Summary      GENERAL: NAD, well-developed  HEAD:  Atraumatic, Normocephalic  EYES: EOMI, PERRLA, conjunctiva and sclera clear  NECK: Supple, No JVD  CHEST/LUNG: Clear to auscultation bilaterally; No wheezes, rales or rhonchi   HEART: Regular rate and rhythm; No murmurs, rubs, or gallops  ABDOMEN: Soft, Nontender, Nondistended; Bowel sounds present  EXTREMITIES:  2+ Peripheral Pulses, No clubbing, cyanosis, or edema  PSYCH: AAOx3  NEUROLOGY: non-focal  SKIN: No rashes or lesions    LABS:                        14.1   16.67 )-----------( 215      ( 2018 07:58 )             42.5     06-08    139  |  103  |  13  ----------------------------<  107<H>  3.3<L>   |  23  |  0.50    Ca    8.3<L>      2018 06:22  Phos  1.9     06-08  Mg     1.7     06-08    TPro  6.7  /  Alb  3.4  /  TBili  0.9  /  DBili  x   /  AST  13  /  ALT  8<L>  /  AlkPhos  76  06-07          Urinalysis Basic - ( 2018 18:51 )    Color: Yellow / Appearance: SL Turbid / S.027 / pH: x  Gluc: x / Ketone: Trace  / Bili: Negative / Urobili: Negative   Blood: x / Protein: 100 mg/dL / Nitrite: Positive   Leuk Esterase: Large / RBC: x / WBC >50 /HPF   Sq Epi: x / Non Sq Epi: OCC /HPF / Bacteria: Many /HPF        RADIOLOGY & ADDITIONAL TESTS:    Imaging Personally Reviewed:  Consultant(s) Notes Reviewed  Care Discussed with Consultants/Other Providers Patient is a 89y old  Female who presents with a chief complaint of Cough (2018 01:07)      Claudine Olivares MD  PGY1 - Internal Medicine  [facility] [phone] /[place] [phone]  After 7pm and on coverage days, please page Night Float pager at [phone]    SUBJECTIVE / OVERNIGHT EVENTS: No acute overnight events. Had bms overnight but unable to characterize. Complaining of nausea but no vomiting. Continues to have abd pain .    ROS:  Gen: No fever, chills, pain  Resp: No cough, SOB  CV: No chest pain, palpitations  GI: No  vomiting, diarrhea, constipation, melena, hematcohezia  MSK: No arthralgia, weakness, parasthesia  Skin: No rash  ROS negative unless otherwise noted    MEDICATIONS  (STANDING):  ALBUTerol/ipratropium for Nebulization 3 milliLiter(s) Nebulizer every 6 hours  bisacodyl Suppository 10 milliGRAM(s) Rectal daily  clopidogrel Tablet 75 milliGRAM(s) Oral daily  digoxin     Tablet 0.125 milliGRAM(s) Oral daily  enoxaparin Injectable 40 milliGRAM(s) SubCutaneous every 24 hours  escitalopram 10 milliGRAM(s) Oral daily  lactobacillus acidophilus 1 Tablet(s) Oral three times a day with meals  latanoprost 0.005% Ophthalmic Solution 1 Drop(s) Both EYES at bedtime  melatonin 6 milliGRAM(s) Oral at bedtime  metoprolol tartrate 25 milliGRAM(s) Oral two times a day  pantoprazole    Tablet 40 milliGRAM(s) Oral before breakfast  piperacillin/tazobactam IVPB. 3.375 Gram(s) IV Intermittent every 8 hours  timolol 0.5% Solution 1 Drop(s) Left EYE at bedtime    MEDICATIONS  (PRN):  acetaminophen   Tablet. 650 milliGRAM(s) Oral every 6 hours PRN Mild to moderate pain  guaiFENesin    Syrup 100 milliGRAM(s) Oral every 6 hours PRN Cough      CAPILLARY BLOOD GLUCOSE          PHYSICAL EXAM:  T(C): 36.6 (18 @ 20:50), Max: 36.8 (18 @ 12:20)  HR: 78 (18 @ 23:53) (68 - 82)  BP: 175/79 (18 @ 06:00) (154/82 - 191/78)  RR: 18 (18 @ 23:53) (18 - 20)  SpO2: 95% (18 @ 23:53) (92% - 95%)  Wt(kg): --  Daily     Daily   I&O's Summary      GENERAL: NAD, well-developed  HEAD:  Atraumatic, Normocephalic  EYES: EOMI, PERRLA, conjunctiva and sclera clear  NECK: Supple, No JVD  CHEST/LUNG: Clear to auscultation bilaterally; No wheezes, rales or rhonchi   HEART: Regular rate and rhythm; No murmurs, rubs, or gallops  ABDOMEN: Soft, Nontender, Nondistended; Bowel sounds present  EXTREMITIES:  2+ Peripheral Pulses, No clubbing, cyanosis, or edema  PSYCH: AAOx3  NEUROLOGY: non-focal  SKIN: No rashes or lesions    LABS:                        14.1   16.67 )-----------( 215      ( 2018 07:58 )             42.5     06-08    139  |  103  |  13  ----------------------------<  107<H>  3.3<L>   |  23  |  0.50    Ca    8.3<L>      2018 06:22  Phos  1.9     06-08  Mg     1.7     -    TPro  6.7  /  Alb  3.4  /  TBili  0.9  /  DBili  x   /  AST  13  /  ALT  8<L>  /  AlkPhos  76  06-07      Urinalysis Basic - ( 2018 18:51 )  Color: Yellow / Appearance: SL Turbid / S.027 / pH: x  Gluc: x / Ketone: Trace  / Bili: Negative / Urobili: Negative   Blood: x / Protein: 100 mg/dL / Nitrite: Positive   Leuk Esterase: Large / RBC: x / WBC >50 /HPF   Sq Epi: x / Non Sq Epi: OCC /HPF / Bacteria: Many /HPF        RADIOLOGY & ADDITIONAL TESTS:    Imaging Personally Reviewed:  Consultant(s) Notes Reviewed  Care Discussed with Consultants/Other Providers Patient is a 89y old  Female who presents with a chief complaint of Cough (2018 01:07)      Claudine Olivares MD  PGY1 - Internal Medicine  [facility] [phone] /[place] [id number]  After 7pm and on coverage days, please page Night Float pager at [phone]    SUBJECTIVE / OVERNIGHT EVENTS: No acute overnight events. Had bms overnight but unable to characterize. Complaining of nausea but no vomiting. Continues to have abd pain .    ROS:  Gen: No fever, chills, pain  Resp: No cough, SOB  CV: No chest pain, palpitations  GI: No  vomiting, diarrhea, constipation, melena, hematcohezia  MSK: No arthralgia, weakness, parasthesia  Skin: No rash  ROS negative unless otherwise noted    MEDICATIONS  (STANDING):  ALBUTerol/ipratropium for Nebulization 3 milliLiter(s) Nebulizer every 6 hours  bisacodyl Suppository 10 milliGRAM(s) Rectal daily  clopidogrel Tablet 75 milliGRAM(s) Oral daily  digoxin     Tablet 0.125 milliGRAM(s) Oral daily  enoxaparin Injectable 40 milliGRAM(s) SubCutaneous every 24 hours  escitalopram 10 milliGRAM(s) Oral daily  lactobacillus acidophilus 1 Tablet(s) Oral three times a day with meals  latanoprost 0.005% Ophthalmic Solution 1 Drop(s) Both EYES at bedtime  melatonin 6 milliGRAM(s) Oral at bedtime  metoprolol tartrate 25 milliGRAM(s) Oral two times a day  pantoprazole    Tablet 40 milliGRAM(s) Oral before breakfast  piperacillin/tazobactam IVPB. 3.375 Gram(s) IV Intermittent every 8 hours  timolol 0.5% Solution 1 Drop(s) Left EYE at bedtime    MEDICATIONS  (PRN):  acetaminophen   Tablet. 650 milliGRAM(s) Oral every 6 hours PRN Mild to moderate pain  guaiFENesin    Syrup 100 milliGRAM(s) Oral every 6 hours PRN Cough      CAPILLARY BLOOD GLUCOSE          PHYSICAL EXAM:  T(C): 36.6 (18 @ 20:50), Max: 36.8 (18 @ 12:20)  HR: 78 (18 @ 23:53) (68 - 82)  BP: 175/79 (18 @ 06:00) (154/82 - 191/78)  RR: 18 (18 @ 23:53) (18 - 20)  SpO2: 95% (18 @ 23:53) (92% - 95%)  Wt(kg): --  Daily     Daily   I&O's Summary      GENERAL: elderly, frail  HEAD:  Atraumatic, Normocephalic  EYES: EOMI, PERRLA, conjunctiva and sclera clear  NECK: Supple, No JVD  CHEST/LUNG: Clear to auscultation bilaterally; No wheezes, rales or rhonchi   HEART: Regular rate and rhythm; No murmurs, rubs, or gallops  ABDOMEN: mild distention but normal bowel sounds  EXTREMITIES:  2+ Peripheral Pulses, No clubbing, cyanosis, or edema  PSYCH: AAOx3  NEUROLOGY: non-focal  SKIN: No rashes or lesions    LABS:                        14.1   16.67 )-----------( 215      ( 2018 07:58 )             42.5     06-08    139  |  103  |  13  ----------------------------<  107<H>  3.3<L>   |  23  |  0.50    Ca    8.3<L>      2018 06:22  Phos  1.9     06-08  Mg     1.7     -08    TPro  6.7  /  Alb  3.4  /  TBili  0.9  /  DBili  x   /  AST  13  /  ALT  8<L>  /  AlkPhos  76  06-07      Urinalysis Basic - ( 2018 18:51 )  Color: Yellow / Appearance: SL Turbid / S.027 / pH: x  Gluc: x / Ketone: Trace  / Bili: Negative / Urobili: Negative   Blood: x / Protein: 100 mg/dL / Nitrite: Positive   Leuk Esterase: Large / RBC: x / WBC >50 /HPF   Sq Epi: x / Non Sq Epi: OCC /HPF / Bacteria: Many /HPF        RADIOLOGY & ADDITIONAL TESTS:    Imaging Personally Reviewed: AXR-- moderately distended bowel. Final read pending  Consultant(s) Notes Reviewed case management.  Care Discussed with Consultants/Other Providers: RN

## 2018-06-09 NOTE — SWALLOW BEDSIDE ASSESSMENT ADULT - SLP PERTINENT HISTORY OF CURRENT PROBLEM
89yo F with a history of dementia, Afib not on AC, CAD, HLD, recurrent UTIs and Cdiff presenting with hypoxic respiratory failure 2/2 aspiration pneumonia c/b UTI.  MD f/u: 6/9/18 Acute respiratory failure with hypoxia.  Plan: Pt presenting with weeks of worsening cough, with CT findings of near complete collapse of RLL and opacification of RLL and bronchus intermedius; may be 2/2 mucous plugging, lung mass in the bronchus or post-obstructive pneumonia.; Distended abdomen.  Plan: suspect constipation, r/o SBO

## 2018-06-09 NOTE — PROGRESS NOTE ADULT - PROBLEM SELECTOR PLAN 9
DVT ppx: Lovenox  Diet: Mechanical soft  PT consult  B/L adrenal gland thickening on imaging - monitor as outpt  Dispo: Likely back to [facility] DVT ppx: Lovenox  Diet: Mechanical soft  PT consult  out of bed to chair daily  B/L adrenal gland thickening on imaging - monitor as outpt  Dispo: Likely back to [facility]

## 2018-06-09 NOTE — PROGRESS NOTE ADULT - PROBLEM SELECTOR PLAN 2
UA grossly positive, pt with previous UTIs, last culture in Feb 2018 grew E. coli sensitive to Ceftriaxone but will do zosyn to cover aspiration pneumonia.   - C/w Zosyn  - F/u urine cultures  - trend lactic acidosis UA grossly positive, pt with previous UTIs, last culture in Feb 2018 grew E. coli sensitive to Ceftriaxone but will do zosyn to cover aspiration pneumonia. Currently growing Ecoli.   - C/w Zosyn  - trend lactic acidosis

## 2018-06-09 NOTE — SWALLOW BEDSIDE ASSESSMENT ADULT - COMMENTS
Per d/w Nsg, Pt evidences baseline cough however when drinking water earlier today, no s/s of aspiration evident as the Pt was not coughing.

## 2018-06-09 NOTE — PROGRESS NOTE ADULT - PROBLEM SELECTOR PLAN 1
Pt presenting with weeks of worsening cough, with CT findings of near complete collapse of RLL and opacification of RLL and bronchus intermedius; may be 2/2 mucous plugging, lung mass in the bronchus or post-obstructive pneumonia  - c/w Zosyn to cover for aspiration pneumonia 6/7-  - lactobacillus while on abx given hx of c diff  - Chest PT q6h for mucous plugging, chest vest atc  - Check sputum culture  - Duoneb q6h  - Continue supplemental oxygen PRN - deescalate as tolerates  - Robitussin PRN  - Aspiration precaution  - Incentive spirometry limited as patient likely to not comply.  - f/u BCx  - speech and swallow eval Pt presenting with weeks of worsening cough, with CT findings of near complete collapse of RLL and opacification of RLL and bronchus intermedius; may be 2/2 mucous plugging, lung mass in the bronchus or post-obstructive pneumonia  - c/w Zosyn to cover for aspiration pneumonia 6/7-  - lactobacillus while on abx given hx of c diff  - Chest PT q6h for mucous plugging, chest vest atc  - Check sputum culture  - Duoneb q6h  - Continue supplemental oxygen PRN - deescalate as tolerates  - Robitussin PRN  - Aspiration precaution  - Incentive spirometry limited as patient likely to not comply.  - f/u BCx  - speech and swallow eval, will start with bedside eval.

## 2018-06-10 DIAGNOSIS — K56.7 ILEUS, UNSPECIFIED: ICD-10-CM

## 2018-06-10 DIAGNOSIS — R19.7 DIARRHEA, UNSPECIFIED: ICD-10-CM

## 2018-06-10 LAB
ANION GAP SERPL CALC-SCNC: 18 MMOL/L — HIGH (ref 5–17)
BASOPHILS # BLD AUTO: 0.03 K/UL — SIGNIFICANT CHANGE UP (ref 0–0.2)
BASOPHILS NFR BLD AUTO: 0.2 % — SIGNIFICANT CHANGE UP (ref 0–2)
BUN SERPL-MCNC: 9 MG/DL — SIGNIFICANT CHANGE UP (ref 7–23)
CALCIUM SERPL-MCNC: 8.3 MG/DL — LOW (ref 8.4–10.5)
CHLORIDE SERPL-SCNC: 103 MMOL/L — SIGNIFICANT CHANGE UP (ref 96–108)
CO2 SERPL-SCNC: 22 MMOL/L — SIGNIFICANT CHANGE UP (ref 22–31)
CREAT SERPL-MCNC: 0.57 MG/DL — SIGNIFICANT CHANGE UP (ref 0.5–1.3)
EOSINOPHIL # BLD AUTO: 0.2 K/UL — SIGNIFICANT CHANGE UP (ref 0–0.5)
EOSINOPHIL NFR BLD AUTO: 1.5 % — SIGNIFICANT CHANGE UP (ref 0–6)
GLUCOSE BLDC GLUCOMTR-MCNC: 98 MG/DL — SIGNIFICANT CHANGE UP (ref 70–99)
GLUCOSE SERPL-MCNC: 77 MG/DL — SIGNIFICANT CHANGE UP (ref 70–99)
HCT VFR BLD CALC: 46.8 % — HIGH (ref 34.5–45)
HGB BLD-MCNC: 15.1 G/DL — SIGNIFICANT CHANGE UP (ref 11.5–15.5)
IMM GRANULOCYTES NFR BLD AUTO: 0.6 % — SIGNIFICANT CHANGE UP (ref 0–1.5)
LYMPHOCYTES # BLD AUTO: 1.2 K/UL — SIGNIFICANT CHANGE UP (ref 1–3.3)
LYMPHOCYTES # BLD AUTO: 8.9 % — LOW (ref 13–44)
MAGNESIUM SERPL-MCNC: 1.9 MG/DL — SIGNIFICANT CHANGE UP (ref 1.6–2.6)
MCHC RBC-ENTMCNC: 30.3 PG — SIGNIFICANT CHANGE UP (ref 27–34)
MCHC RBC-ENTMCNC: 32.3 GM/DL — SIGNIFICANT CHANGE UP (ref 32–36)
MCV RBC AUTO: 94 FL — SIGNIFICANT CHANGE UP (ref 80–100)
MONOCYTES # BLD AUTO: 1.12 K/UL — HIGH (ref 0–0.9)
MONOCYTES NFR BLD AUTO: 8.3 % — SIGNIFICANT CHANGE UP (ref 2–14)
NEUTROPHILS # BLD AUTO: 10.82 K/UL — HIGH (ref 1.8–7.4)
NEUTROPHILS NFR BLD AUTO: 80.5 % — HIGH (ref 43–77)
PHOSPHATE SERPL-MCNC: 2.8 MG/DL — SIGNIFICANT CHANGE UP (ref 2.5–4.5)
PLATELET # BLD AUTO: 307 K/UL — SIGNIFICANT CHANGE UP (ref 150–400)
POTASSIUM SERPL-MCNC: 3.4 MMOL/L — LOW (ref 3.5–5.3)
POTASSIUM SERPL-SCNC: 3.4 MMOL/L — LOW (ref 3.5–5.3)
RBC # BLD: 4.98 M/UL — SIGNIFICANT CHANGE UP (ref 3.8–5.2)
RBC # FLD: 15 % — HIGH (ref 10.3–14.5)
SODIUM SERPL-SCNC: 143 MMOL/L — SIGNIFICANT CHANGE UP (ref 135–145)
WBC # BLD: 13.45 K/UL — HIGH (ref 3.8–10.5)
WBC # FLD AUTO: 13.45 K/UL — HIGH (ref 3.8–10.5)

## 2018-06-10 PROCEDURE — 99233 SBSQ HOSP IP/OBS HIGH 50: CPT

## 2018-06-10 RX ORDER — LANOLIN ALCOHOL/MO/W.PET/CERES
3 CREAM (GRAM) TOPICAL AT BEDTIME
Qty: 0 | Refills: 0 | Status: DISCONTINUED | OUTPATIENT
Start: 2018-06-10 | End: 2018-06-13

## 2018-06-10 RX ADMIN — Medication 25 MILLIGRAM(S): at 17:02

## 2018-06-10 RX ADMIN — PIPERACILLIN AND TAZOBACTAM 25 GRAM(S): 4; .5 INJECTION, POWDER, LYOPHILIZED, FOR SOLUTION INTRAVENOUS at 05:20

## 2018-06-10 RX ADMIN — LATANOPROST 1 DROP(S): 0.05 SOLUTION/ DROPS OPHTHALMIC; TOPICAL at 21:20

## 2018-06-10 RX ADMIN — Medication 3 MILLILITER(S): at 17:02

## 2018-06-10 RX ADMIN — Medication 1 DROP(S): at 21:19

## 2018-06-10 RX ADMIN — Medication 3 MILLILITER(S): at 12:18

## 2018-06-10 RX ADMIN — AMLODIPINE BESYLATE 5 MILLIGRAM(S): 2.5 TABLET ORAL at 08:39

## 2018-06-10 RX ADMIN — Medication 3 MILLILITER(S): at 06:04

## 2018-06-10 RX ADMIN — ESCITALOPRAM OXALATE 10 MILLIGRAM(S): 10 TABLET, FILM COATED ORAL at 12:18

## 2018-06-10 RX ADMIN — PIPERACILLIN AND TAZOBACTAM 25 GRAM(S): 4; .5 INJECTION, POWDER, LYOPHILIZED, FOR SOLUTION INTRAVENOUS at 21:26

## 2018-06-10 RX ADMIN — Medication 3 MILLIGRAM(S): at 21:18

## 2018-06-10 RX ADMIN — Medication 100 MILLIGRAM(S): at 17:02

## 2018-06-10 RX ADMIN — PIPERACILLIN AND TAZOBACTAM 25 GRAM(S): 4; .5 INJECTION, POWDER, LYOPHILIZED, FOR SOLUTION INTRAVENOUS at 13:20

## 2018-06-10 RX ADMIN — Medication 0.12 MILLIGRAM(S): at 08:39

## 2018-06-10 RX ADMIN — ENOXAPARIN SODIUM 40 MILLIGRAM(S): 100 INJECTION SUBCUTANEOUS at 06:04

## 2018-06-10 RX ADMIN — Medication 1 TABLET(S): at 17:02

## 2018-06-10 RX ADMIN — ONDANSETRON 4 MILLIGRAM(S): 8 TABLET, FILM COATED ORAL at 08:39

## 2018-06-10 RX ADMIN — CLOPIDOGREL BISULFATE 75 MILLIGRAM(S): 75 TABLET, FILM COATED ORAL at 12:18

## 2018-06-10 RX ADMIN — Medication 25 MILLIGRAM(S): at 08:39

## 2018-06-10 RX ADMIN — Medication 3 MILLILITER(S): at 00:17

## 2018-06-10 RX ADMIN — Medication 1 TABLET(S): at 12:18

## 2018-06-10 RX ADMIN — PANTOPRAZOLE SODIUM 40 MILLIGRAM(S): 20 TABLET, DELAYED RELEASE ORAL at 08:39

## 2018-06-10 NOTE — PROGRESS NOTE ADULT - PROBLEM SELECTOR PLAN 4
UA grossly positive, pt with previous UTIs, last culture in Feb 2018 grew E. coli sensitive to Ceftriaxone but will do zosyn to cover aspiration pneumonia. Currently growing Ecoli.   - C/w Zosyn  - trend lactic acidosis

## 2018-06-10 NOTE — PROGRESS NOTE ADULT - PROBLEM SELECTOR PLAN 5
no formal dx of HTN, however w/ elevated SBP 180s-190s, asymptomatic  - c/w home metoprolol  - add amlodipine 5 today

## 2018-06-10 NOTE — DISCHARGE NOTE ADULT - CARE PLAN
Principal Discharge DX:	Acute respiratory failure with hypoxia  Goal:	To follow up with Dr. Awan within one week of discharge for further management.  Assessment and plan of treatment:	You were found to have a pneumonia this admission likely 2/2 to aspiration of food. This caused acute respiratory failure with hypoxia which was treated with a 5 day course of antibiotics. You improved clinically and were able to discharged. Please follow up with Dr. Awan within one week of discharge. Principal Discharge DX:	Acute respiratory failure with hypoxia  Goal:	To follow up with Dr. Awan within one week of discharge for further management.  Assessment and plan of treatment:	You were found to have a pneumonia this admission likely 2/2 to aspiration of food. This caused acute respiratory failure with hypoxia which was treated with a 5 day course of antibiotics. You improved clinically and were able to discharged. Please follow up with Dr. Awan within one week of discharge.  Secondary Diagnosis:	Atrial fibrillation, unspecified type  Goal:	Continue management  Assessment and plan of treatment:	You have a history of atrial fibrillation, which was stable during this admission. Please continue to take your medications as prescribed and follow up with your primary care doctor.  Secondary Diagnosis:	Coronary artery disease involving native coronary artery of native heart without angina pectoris  Goal:	Continue management  Assessment and plan of treatment:	You have a history of coronary artery disease, which was stable during this admission. Please continue to take your medications as prescribed and follow up with your primary care doctor.  Secondary Diagnosis:	HTN (hypertension)  Goal:	Continue management  Assessment and plan of treatment:	You have a history of hypertension, which was stable during this admission. Please continue to take your medications as prescribed and follow up with your primary care doctor.  Secondary Diagnosis:	Need for prophylactic antibiotic  Goal:	Monitor for clostridium difficile  Assessment and plan of treatment:	You completed a course of antibiotics on this admission. Given your history of clostridium difficile in the past, please take Vancomycin orally and a probiotic as prescribed. If you developed profuse diarrhea, please do not take Imodium. Call your doctor and/or present to an emergency department for evaluation for clostridium difficile infection.

## 2018-06-10 NOTE — DISCHARGE NOTE ADULT - HOSPITAL COURSE
89yo F with a history of dementia (A&Ox1-2), Afib not on AC, CAD, HLD, recurrent UTIs and Cdiff presenting with cough from [facility]. Pt states that she has had productive cough for the past few weeks and has had SOB. She also has been having dysuria and intermittent abdominal pain, with no nausea or vomiting. Pt had a CXR done the day PTA which reportedly showed no acute findings.    Admitted for possible aspiration PNA and E.coli UTI. She was treated with zosyn. She was found to have an adynamic ileus. She was made NPO. Patient had a couple of diarrhea and was tested for clostridium difficile. 89yo F with a history of dementia (A&Ox1-2), Afib not on AC, CAD, HLD, recurrent UTIs and Cdiff presenting with cough from [facility]. Pt states that she has had productive cough for the past few weeks and has had SOB. She also has been having dysuria and intermittent abdominal pain, with no nausea or vomiting. Pt had a CXR done the day PTA which reportedly showed no acute findings.    Admitted for possible aspiration PNA and E.coli UTI. She was treated with zosyn. She was found to have an adynamic ileus. She was made NPO. The patient had bowel movements and passed gas. Was able to tolerate PO diet however was high risk for aspiration. The pts daughter declined to have the pts undergo a speech and swallow evaluation based on her advanced age and dementia. The risks and benefits of allowing the pt to continue to eat a PO diet without undergoing a formal speech and swallow evaluation were explained to the daughter who understood and repeated the risks which could include developing pneumonia, and even death. The decision was made to allow the pt to eat. Patient improved clinically with antibiotics and no longer required oxygen. Pt discharged to [facility]. 89yo F with a history of dementia (A&Ox1-2), Afib not on AC, CAD, HLD, recurrent UTIs and Cdiff presenting with cough from [facility]. Pt states that she has had productive cough for the past few weeks and has had SOB. She also has been having dysuria and intermittent abdominal pain, with no nausea or vomiting. Pt had a CXR done the day PTA which reportedly showed no acute findings.    Admitted for possible aspiration PNA and E.coli UTI. She was treated with Zosyn. She was found to have an adynamic ileus and made NPO. The patient had bowel movements and passed gas. She was able to tolerate a PO diet however she was deemed high risk for aspiration. The patient's daughter declined to have the patient undergo a speech and swallow evaluation based on her advanced age and dementia. The risks and benefits of allowing the patient to continue to eat a PO diet without undergoing a formal speech and swallow evaluation were explained to the daughter who understood and repeated the risks which could include developing pneumonia and even death. The decision was made to allow the patient to eat. Patient improved clinically with antibiotics and no longer required oxygen. Given her history of clostridium difficile colitis, she was started on prophylactic PO Vancomycin and Florastor. The patient is now medically stable and cleared for discharge back to the [facility].

## 2018-06-10 NOTE — DISCHARGE NOTE ADULT - PROVIDER TOKENS
FREE:[LAST:[Emperatriz],FIRST:[Jewels],PHONE:[phone]],FAX:[(   [phone]    -],ADDRESS:[[address] [place] [address]]]

## 2018-06-10 NOTE — DISCHARGE NOTE ADULT - SECONDARY DIAGNOSIS.
Atrial fibrillation, unspecified type Coronary artery disease involving native coronary artery of native heart without angina pectoris HTN (hypertension) Need for prophylactic antibiotic

## 2018-06-10 NOTE — PROGRESS NOTE ADULT - ASSESSMENT
89 yo F from [facility] of dementia (A&O 1-2 at baseline), Afib not on AC, CAD, HLD, recurrent UTIs and recurrent Cdiff p/w cough/sob adm for acute hypoxic respiratory failure 2/2 aspiration pneumonia c/b UTI on zosyn.

## 2018-06-10 NOTE — DISCHARGE NOTE ADULT - PLAN OF CARE
To follow up with Dr. Awan within one week of discharge for further management. You were found to have a pneumonia this admission likely 2/2 to aspiration of food. This caused acute respiratory failure with hypoxia which was treated with a 5 day course of antibiotics. You improved clinically and were able to discharged. Please follow up with Dr. Awan within one week of discharge. Continue management You have a history of atrial fibrillation, which was stable during this admission. Please continue to take your medications as prescribed and follow up with your primary care doctor. You have a history of coronary artery disease, which was stable during this admission. Please continue to take your medications as prescribed and follow up with your primary care doctor. You have a history of hypertension, which was stable during this admission. Please continue to take your medications as prescribed and follow up with your primary care doctor. Monitor for clostridium difficile You completed a course of antibiotics on this admission. Given your history of clostridium difficile in the past, please take Vancomycin orally and a probiotic as prescribed. If you developed profuse diarrhea, please do not take Imodium. Call your doctor and/or present to an emergency department for evaluation for clostridium difficile infection.

## 2018-06-10 NOTE — PROGRESS NOTE ADULT - PROBLEM SELECTOR PLAN 3
Patient with several episodes of watery diarrhea s/p tap water enema yesterday  - noted ileus on AXR  - will hold bowel regimen  - consider C. diff if continues Patient with several episodes of watery diarrhea s/p tap water enema yesterday  - noted ileus on AXR  - will hold bowel regimen  - check c. diff

## 2018-06-10 NOTE — DISCHARGE NOTE ADULT - PATIENT PORTAL LINK FT
You can access the [facility] Patient Portal, offered by [facility], by registering with the following website: [email or web address]

## 2018-06-10 NOTE — PROGRESS NOTE ADULT - SUBJECTIVE AND OBJECTIVE BOX
Dinorah Dave MD, R2  Pager: [phone] / [phone]    Patient is a 89y old  Female who presents with a chief complaint of Cough (08 Jun 2018 01:07)      SUBJECTIVE / OVERNIGHT EVENTS: Patient noted to have adynamic ileus on AXR overnight, made NPO. Denies chest pain, nausea, vomiting, fever, chills. Per nursing, patient has had several episodes of watery diarrhea overnight. Patient arousable and conversant but lethargic this morning, FSBG 98, O2 95% on room air.    MEDICATIONS  (STANDING):  ALBUTerol/ipratropium for Nebulization 3 milliLiter(s) Nebulizer every 6 hours  amLODIPine   Tablet 5 milliGRAM(s) Oral daily  clopidogrel Tablet 75 milliGRAM(s) Oral daily  digoxin     Tablet 0.125 milliGRAM(s) Oral daily  enoxaparin Injectable 40 milliGRAM(s) SubCutaneous every 24 hours  escitalopram 10 milliGRAM(s) Oral daily  lactobacillus acidophilus 1 Tablet(s) Oral three times a day with meals  latanoprost 0.005% Ophthalmic Solution 1 Drop(s) Both EYES at bedtime  melatonin 6 milliGRAM(s) Oral at bedtime  metoprolol tartrate 25 milliGRAM(s) Oral two times a day  pantoprazole    Tablet 40 milliGRAM(s) Oral before breakfast  piperacillin/tazobactam IVPB. 3.375 Gram(s) IV Intermittent every 8 hours  timolol 0.5% Solution 1 Drop(s) Left EYE at bedtime    MEDICATIONS  (PRN):  acetaminophen   Tablet. 650 milliGRAM(s) Oral every 6 hours PRN Mild to moderate pain  guaiFENesin    Syrup 100 milliGRAM(s) Oral every 6 hours PRN Cough  ondansetron Injectable 4 milliGRAM(s) IV Push every 8 hours PRN Nausea and/or Vomiting      CAPILLARY BLOOD GLUCOSE  POCT Blood Glucose.: 98 mg/dL (10 Alexandru 2018 08:14)    I&O's Summary    09 Jun 2018 07:01  -  10 Alexandru 2018 07:00  --------------------------------------------------------  IN: 890 mL / OUT: 0 mL / NET: 890 mL    10 Alexandru 2018 07:01  -  10 Alexandru 2018 10:07  --------------------------------------------------------  IN: 0 mL / OUT: 0 mL / NET: 0 mL      PHYSICAL EXAM:  Vital Signs Last 24 Hrs  T(F): 98.7 (10 Alexandru 2018 09:46), Max: 98.7 (10 Alexandru 2018 09:46)  HR: 78 (10 Alexandru 2018 09:46) (71 - 104)  BP: 126/90 (10 Alexandru 2018 09:46) (126/90 - 163/83)  RR: 18 (10 Alexandru 2018 09:46) (16 - 18)  SpO2: 94% (10 Alexandru 2018 09:46) (90% - 94%)    GENERAL: NAD, well-developed  HEAD:  Atraumatic, Normocephalic  EYES: EOMI, PERRLA, conjunctiva and sclera clear  NECK: Supple, No JVD  CHEST/LUNG: Clear to auscultation bilaterally; No wheeze  HEART: Regular rate and rhythm; No murmurs, rubs, or gallops  ABDOMEN: Soft, Nontender, Nondistended; Bowel sounds present  EXTREMITIES:  2+ Peripheral Pulses, No clubbing, cyanosis, or edema  PSYCH: AAOx1-2  NEUROLOGY: non-focal  SKIN: No rashes or lesions    LABS:                        15.1   13.45 )-----------( 307      ( 10 Alexandru 2018 08:32 )             46.8     06-10    143  |  103  |  9   ----------------------------<  77  3.4<L>   |  22  |  0.57    Ca    8.3<L>      10 Alexandru 2018 07:24  Phos  2.8     06-10  Mg     1.9     06-10    TPro  5.8<L>  /  Alb  2.8<L>  /  TBili  0.9  /  DBili  x   /  AST  9<L>  /  ALT  6<L>  /  AlkPhos  65  06-09        RADIOLOGY & ADDITIONAL TESTS:    Imaging Personally Reviewed:    Consultant(s) Notes Reviewed:      Care Discussed with Consultants/Other Providers: Dinorah Dave MD, R2  Pager: [phone] / [phone]    Patient is a 89y old  Female who presents with a chief complaint of Cough (08 Jun 2018 01:07)      SUBJECTIVE / OVERNIGHT EVENTS: Patient noted to have adynamic ileus on AXR overnight, made NPO. Denies chest pain, nausea, vomiting, fever, chills. Per nursing, patient has had several episodes of watery diarrhea overnight. Patient arousable and conversant but lethargic this morning, FSBG 98, O2 95% on room air.    MEDICATIONS  (STANDING):  ALBUTerol/ipratropium for Nebulization 3 milliLiter(s) Nebulizer every 6 hours  amLODIPine   Tablet 5 milliGRAM(s) Oral daily  clopidogrel Tablet 75 milliGRAM(s) Oral daily  digoxin     Tablet 0.125 milliGRAM(s) Oral daily  enoxaparin Injectable 40 milliGRAM(s) SubCutaneous every 24 hours  escitalopram 10 milliGRAM(s) Oral daily  lactobacillus acidophilus 1 Tablet(s) Oral three times a day with meals  latanoprost 0.005% Ophthalmic Solution 1 Drop(s) Both EYES at bedtime  melatonin 6 milliGRAM(s) Oral at bedtime  metoprolol tartrate 25 milliGRAM(s) Oral two times a day  pantoprazole    Tablet 40 milliGRAM(s) Oral before breakfast  piperacillin/tazobactam IVPB. 3.375 Gram(s) IV Intermittent every 8 hours  timolol 0.5% Solution 1 Drop(s) Left EYE at bedtime    MEDICATIONS  (PRN):  acetaminophen   Tablet. 650 milliGRAM(s) Oral every 6 hours PRN Mild to moderate pain  guaiFENesin    Syrup 100 milliGRAM(s) Oral every 6 hours PRN Cough  ondansetron Injectable 4 milliGRAM(s) IV Push every 8 hours PRN Nausea and/or Vomiting      CAPILLARY BLOOD GLUCOSE  POCT Blood Glucose.: 98 mg/dL (10 Alexandru 2018 08:14)    I&O's Summary    09 Jun 2018 07:01  -  10 Alexandru 2018 07:00  --------------------------------------------------------  IN: 890 mL / OUT: 0 mL / NET: 890 mL    10 Alexandru 2018 07:01  -  10 Alexandru 2018 10:07  --------------------------------------------------------  IN: 0 mL / OUT: 0 mL / NET: 0 mL      PHYSICAL EXAM:  Vital Signs Last 24 Hrs  T(F): 98.7 (10 Alexandru 2018 09:46), Max: 98.7 (10 Alexandru 2018 09:46)  HR: 78 (10 Alexandru 2018 09:46) (71 - 104)  BP: 126/90 (10 Alexandru 2018 09:46) (126/90 - 163/83)  RR: 18 (10 Alexandru 2018 09:46) (16 - 18)  SpO2: 94% (10 Alexandru 2018 09:46) (90% - 94%)    GENERAL: elderly, frail  HEAD:  Atraumatic, Normocephalic  EYES: EOMI, PERRLA, conjunctiva and sclera clear  NECK: Supple, No JVD  CHEST/LUNG: Clear to auscultation bilaterally; No wheeze  HEART: Regular rate and rhythm; No murmurs, rubs, or gallops  ABDOMEN: hyperactive bowel sounds, non distended, soft  EXTREMITIES:  2+ Peripheral Pulses, No clubbing, cyanosis, or edema  PSYCH: AAOx1-2  NEUROLOGY: non-focal  SKIN: No rashes or lesions    LABS:                        15.1   13.45 )-----------( 307      ( 10 Alexandru 2018 08:32 )             46.8     06-10    143  |  103  |  9   ----------------------------<  77  3.4<L>   |  22  |  0.57    Ca    8.3<L>      10 Alexandru 2018 07:24  Phos  2.8     06-10  Mg     1.9     06-10    TPro  5.8<L>  /  Alb  2.8<L>  /  TBili  0.9  /  DBili  x   /  AST  9<L>  /  ALT  6<L>  /  AlkPhos  65  06-09        RADIOLOGY & ADDITIONAL TESTS:    Imaging Personally Reviewed: axr-- ileus    Consultant(s) Notes Reviewed:      Care Discussed with Consultants/Other Providers:

## 2018-06-10 NOTE — DISCHARGE NOTE ADULT - MEDICATION SUMMARY - MEDICATIONS TO TAKE
I will START or STAY ON the medications listed below when I get home from the hospital:    bengay patch  -- apply ot both knees in the morning and remove 6-8 hrs later  -- Indication: For Pain    enalapril 2.5 mg oral tablet  -- 1 tab(s) by mouth once a day (at bedtime)  -- Indication: For Hypertension    digoxin 125 mcg (0.125 mg) oral tablet  -- 1 tab(s) by mouth once a day  -- Indication: For Atrial fibrillation, unspecified type    escitalopram 10 mg oral tablet  -- 1 tab(s) by mouth once a day  -- Indication: For Depression    Plavix 75 mg oral tablet  -- 1 tab(s) by mouth once a day  -- Indication: For CAD    metoprolol tartrate 25 mg oral tablet  -- 1 tab(s) by mouth 2 times a day  -- Indication: For Atrial fibrillation, unspecified type    amLODIPine 5 mg oral tablet  -- 1 tab(s) by mouth once a day  -- Indication: For Hypertension    vancomycin 125 mg oral capsule  -- 1 cap(s) by mouth every 6 hours  -- Indication: For Clostridium difficile prophylaxis    potassium chloride 20 mEq oral tablet, extended release  -- 1 tab(s) by mouth Monday, Wednesday, and Friday  -- Indication: For Dietary Supplement    ACT Fluoride Rinse 0.05% topical solution  -- 5 milliliter(s) by mouth once a day -swish and spit  -- Indication: For Mouth Wash    Flonase 50 mcg/inh nasal spray  -- 1 spray(s) into nose once a day  -- Indication: For Allergies    melatonin 3 mg oral tablet  -- 2 tab(s) by mouth once (at bedtime)  -- Indication: For Insomnia    latanoprost 0.005% ophthalmic solution  -- 1 drop(s) to each affected eye once a day (at bedtime)  -- Indication: For Eye Care    timolol maleate 0.5% ophthalmic solution  -- 1 drop(s) to each affected eye once a day (at bedtime)  -- Indication: For Eye Care    saccharomyces boulardii lyo 250 mg oral capsule  -- 1 cap(s) by mouth 2 times a day  -- Indication: For Clostridium difficile prophylaxis    omeprazole 20 mg oral delayed release capsule  -- 1 cap(s) by mouth once a day  -- Indication: For GERD

## 2018-06-10 NOTE — PROGRESS NOTE ADULT - PROBLEM SELECTOR PLAN 2
AXR showing adynamic ileus, made NPO overnight  - given tap water enema yesterday with significant diarrhea  - abdomen soft, non-tender on exam  - will restart diet as tolerated today given diarrhea AXR showing adynamic ileus, made NPO overnight  - given tap water enema yesterday with significant diarrhea  - abdomen soft, non-tender on exam  - will restart diet as tolerated today given evidence of bowel function. monitor response closely.

## 2018-06-10 NOTE — PROGRESS NOTE ADULT - PROBLEM SELECTOR PLAN 1
Pt presenting with weeks of worsening cough, with CT findings of near complete collapse of RLL and opacification of RLL and bronchus intermedius; may be 2/2 mucous plugging, lung mass in the bronchus or post-obstructive pneumonia  - c/w Zosyn to cover for aspiration pneumonia 6/7-  - lactobacillus while on abx given hx of c diff  - Chest PT q6h for mucous plugging, chest vest atc  - Check sputum culture  - Duoneb q6h  - Continue supplemental oxygen PRN - deescalate as tolerates  - Robitussin PRN  - Aspiration precaution  - Incentive spirometry limited as patient likely to not comply.  - f/u BCx  - speech and swallow eval, will start with bedside eval.

## 2018-06-11 LAB
ANION GAP SERPL CALC-SCNC: 14 MMOL/L — SIGNIFICANT CHANGE UP (ref 5–17)
BASOPHILS # BLD AUTO: 0.04 K/UL — SIGNIFICANT CHANGE UP (ref 0–0.2)
BASOPHILS NFR BLD AUTO: 0.3 % — SIGNIFICANT CHANGE UP (ref 0–2)
BUN SERPL-MCNC: 14 MG/DL — SIGNIFICANT CHANGE UP (ref 7–23)
CALCIUM SERPL-MCNC: 8.4 MG/DL — SIGNIFICANT CHANGE UP (ref 8.4–10.5)
CHLORIDE SERPL-SCNC: 105 MMOL/L — SIGNIFICANT CHANGE UP (ref 96–108)
CO2 SERPL-SCNC: 24 MMOL/L — SIGNIFICANT CHANGE UP (ref 22–31)
CREAT SERPL-MCNC: 0.66 MG/DL — SIGNIFICANT CHANGE UP (ref 0.5–1.3)
EOSINOPHIL # BLD AUTO: 0.17 K/UL — SIGNIFICANT CHANGE UP (ref 0–0.5)
EOSINOPHIL NFR BLD AUTO: 1.4 % — SIGNIFICANT CHANGE UP (ref 0–6)
GLUCOSE SERPL-MCNC: 104 MG/DL — HIGH (ref 70–99)
HCT VFR BLD CALC: 43.4 % — SIGNIFICANT CHANGE UP (ref 34.5–45)
HGB BLD-MCNC: 14.2 G/DL — SIGNIFICANT CHANGE UP (ref 11.5–15.5)
IMM GRANULOCYTES NFR BLD AUTO: 0.9 % — SIGNIFICANT CHANGE UP (ref 0–1.5)
LYMPHOCYTES # BLD AUTO: 1.33 K/UL — SIGNIFICANT CHANGE UP (ref 1–3.3)
LYMPHOCYTES # BLD AUTO: 11.3 % — LOW (ref 13–44)
MAGNESIUM SERPL-MCNC: 2 MG/DL — SIGNIFICANT CHANGE UP (ref 1.6–2.6)
MANUAL SMEAR VERIFICATION: SIGNIFICANT CHANGE UP
MCHC RBC-ENTMCNC: 30.6 PG — SIGNIFICANT CHANGE UP (ref 27–34)
MCHC RBC-ENTMCNC: 32.7 GM/DL — SIGNIFICANT CHANGE UP (ref 32–36)
MCV RBC AUTO: 93.5 FL — SIGNIFICANT CHANGE UP (ref 80–100)
MONOCYTES # BLD AUTO: 0.98 K/UL — HIGH (ref 0–0.9)
MONOCYTES NFR BLD AUTO: 8.3 % — SIGNIFICANT CHANGE UP (ref 2–14)
NEUTROPHILS # BLD AUTO: 9.16 K/UL — HIGH (ref 1.8–7.4)
NEUTROPHILS NFR BLD AUTO: 77.8 % — HIGH (ref 43–77)
PHOSPHATE SERPL-MCNC: 2.9 MG/DL — SIGNIFICANT CHANGE UP (ref 2.5–4.5)
PLAT MORPH BLD: NORMAL — SIGNIFICANT CHANGE UP
PLATELET # BLD AUTO: 288 K/UL — SIGNIFICANT CHANGE UP (ref 150–400)
POTASSIUM SERPL-MCNC: 3.2 MMOL/L — LOW (ref 3.5–5.3)
POTASSIUM SERPL-SCNC: 3.2 MMOL/L — LOW (ref 3.5–5.3)
RBC # BLD: 4.64 M/UL — SIGNIFICANT CHANGE UP (ref 3.8–5.2)
RBC # FLD: 15.2 % — HIGH (ref 10.3–14.5)
RBC BLD AUTO: NORMAL — SIGNIFICANT CHANGE UP
SODIUM SERPL-SCNC: 143 MMOL/L — SIGNIFICANT CHANGE UP (ref 135–145)
WBC # BLD: 11.79 K/UL — HIGH (ref 3.8–10.5)
WBC # FLD AUTO: 11.79 K/UL — HIGH (ref 3.8–10.5)

## 2018-06-11 PROCEDURE — 99233 SBSQ HOSP IP/OBS HIGH 50: CPT | Mod: GC

## 2018-06-11 RX ORDER — POTASSIUM CHLORIDE 20 MEQ
40 PACKET (EA) ORAL ONCE
Qty: 0 | Refills: 0 | Status: COMPLETED | OUTPATIENT
Start: 2018-06-11 | End: 2018-06-11

## 2018-06-11 RX ADMIN — PANTOPRAZOLE SODIUM 40 MILLIGRAM(S): 20 TABLET, DELAYED RELEASE ORAL at 10:24

## 2018-06-11 RX ADMIN — Medication 3 MILLILITER(S): at 17:55

## 2018-06-11 RX ADMIN — ESCITALOPRAM OXALATE 10 MILLIGRAM(S): 10 TABLET, FILM COATED ORAL at 13:27

## 2018-06-11 RX ADMIN — Medication 40 MILLIEQUIVALENT(S): at 17:56

## 2018-06-11 RX ADMIN — Medication 3 MILLILITER(S): at 23:43

## 2018-06-11 RX ADMIN — Medication 3 MILLILITER(S): at 13:26

## 2018-06-11 RX ADMIN — Medication 1 TABLET(S): at 17:41

## 2018-06-11 RX ADMIN — ENOXAPARIN SODIUM 40 MILLIGRAM(S): 100 INJECTION SUBCUTANEOUS at 06:20

## 2018-06-11 RX ADMIN — Medication 25 MILLIGRAM(S): at 10:23

## 2018-06-11 RX ADMIN — Medication 1 TABLET(S): at 10:24

## 2018-06-11 RX ADMIN — Medication 1 DROP(S): at 21:13

## 2018-06-11 RX ADMIN — Medication 3 MILLILITER(S): at 06:20

## 2018-06-11 RX ADMIN — Medication 3 MILLILITER(S): at 00:49

## 2018-06-11 RX ADMIN — PIPERACILLIN AND TAZOBACTAM 25 GRAM(S): 4; .5 INJECTION, POWDER, LYOPHILIZED, FOR SOLUTION INTRAVENOUS at 13:27

## 2018-06-11 RX ADMIN — PIPERACILLIN AND TAZOBACTAM 25 GRAM(S): 4; .5 INJECTION, POWDER, LYOPHILIZED, FOR SOLUTION INTRAVENOUS at 21:13

## 2018-06-11 RX ADMIN — Medication 25 MILLIGRAM(S): at 21:13

## 2018-06-11 RX ADMIN — Medication 0.12 MILLIGRAM(S): at 10:23

## 2018-06-11 RX ADMIN — LATANOPROST 1 DROP(S): 0.05 SOLUTION/ DROPS OPHTHALMIC; TOPICAL at 21:13

## 2018-06-11 RX ADMIN — PIPERACILLIN AND TAZOBACTAM 25 GRAM(S): 4; .5 INJECTION, POWDER, LYOPHILIZED, FOR SOLUTION INTRAVENOUS at 06:20

## 2018-06-11 RX ADMIN — Medication 3 MILLIGRAM(S): at 21:13

## 2018-06-11 RX ADMIN — AMLODIPINE BESYLATE 5 MILLIGRAM(S): 2.5 TABLET ORAL at 10:24

## 2018-06-11 RX ADMIN — Medication 1 TABLET(S): at 13:26

## 2018-06-11 RX ADMIN — CLOPIDOGREL BISULFATE 75 MILLIGRAM(S): 75 TABLET, FILM COATED ORAL at 13:27

## 2018-06-11 NOTE — PROGRESS NOTE ADULT - SUBJECTIVE AND OBJECTIVE BOX
Patient is a 89y old  Female who presents with a chief complaint of Cough (10 Alexandru 2018 16:59)      SUBJECTIVE / OVERNIGHT EVENTS:    Patient seen and examined at bedside. No acute events overnight.    Review of systems: No chest pain, no shortness of breath, no abdominal pain, no nausea, no vomiting, no diarrhea, no fevers, and no chills overnight.     MEDICATIONS  (STANDING):  ALBUTerol/ipratropium for Nebulization 3 milliLiter(s) Nebulizer every 6 hours  amLODIPine   Tablet 5 milliGRAM(s) Oral daily  clopidogrel Tablet 75 milliGRAM(s) Oral daily  digoxin     Tablet 0.125 milliGRAM(s) Oral daily  enoxaparin Injectable 40 milliGRAM(s) SubCutaneous every 24 hours  escitalopram 10 milliGRAM(s) Oral daily  lactobacillus acidophilus 1 Tablet(s) Oral three times a day with meals  latanoprost 0.005% Ophthalmic Solution 1 Drop(s) Both EYES at bedtime  melatonin 3 milliGRAM(s) Oral at bedtime  metoprolol tartrate 25 milliGRAM(s) Oral two times a day  pantoprazole    Tablet 40 milliGRAM(s) Oral before breakfast  piperacillin/tazobactam IVPB. 3.375 Gram(s) IV Intermittent every 8 hours  timolol 0.5% Solution 1 Drop(s) Left EYE at bedtime    MEDICATIONS  (PRN):  acetaminophen   Tablet. 650 milliGRAM(s) Oral every 6 hours PRN Mild to moderate pain  guaiFENesin    Syrup 100 milliGRAM(s) Oral every 6 hours PRN Cough  ondansetron Injectable 4 milliGRAM(s) IV Push every 8 hours PRN Nausea and/or Vomiting      T(F): 97.7 (06-11 @ 06:23), Max: 98.7 (06-10 @ 09:46)  HR: 77 (06-11 @ 06:23) (66 - 78)  BP: 133/69 (06-11 @ 06:23) (126/90 - 142/73)  RR: 18 (06-11 @ 06:23) (16 - 18)  SpO2: 93% (06-11 @ 06:23) (90% - 94%)  CAPILLARY BLOOD GLUCOSE        I&O's Summary    10 Alexandru 2018 07:01  -  11 Jun 2018 07:00  --------------------------------------------------------  IN: 640 mL / OUT: 0 mL / NET: 640 mL        PHYSICAL EXAM:  GEN: Age appropriate female, resting comfortably in bed, no acute distress, non toxic appearing, speaking in complete sentences.   HEENT: Conjunctiva and sclera normal  PULM: Lungs CTAB, no wheezes, rales, rhonchi  CV: RRR, S1S2, +systolic murmur at DELICIA  Abdominal: Soft, nontender to palpation, non-distended, normoactive bowel sounds  Extremities: No edema or cyanosis  NEURO: AAOx3  Skin: No rashes, lesions      LABS:  Labs personally reviewed.                        15.1   13.45 )-----------( 307      ( 10 Alexandru 2018 08:32 )             46.8     Hgb Trend: 15.1<--, 13.9<--, 14.1<--, 16.0<--  06-11    143  |  105  |  14  ----------------------------<  104<H>  3.2<L>   |  24  |  0.66    Ca    8.4      11 Jun 2018 07:46  Phos  2.9     06-11  Mg     2.0     06-11      Creatinine Trend: 0.66<--, 0.57<--, 0.53<--, 0.50<--, 0.60<--          Benny VelezMely  PGY-1 Pager: [phone]  [id number]  Night Float:

## 2018-06-11 NOTE — PROGRESS NOTE ADULT - PROBLEM SELECTOR PLAN 4
UA grossly positive, pt with previous UTIs, last culture in Feb 2018 grew E. coli sensitive to Ceftriaxone but will do pip-tazo to cover aspiration pneumonia. Currently growing Ecoli.

## 2018-06-11 NOTE — PROGRESS NOTE ADULT - PROBLEM SELECTOR PLAN 5
no formal dx of HTN, however w/ elevated SBP 180s-190s, asymptomatic  - c/w home metoprolol, amlodipine 5

## 2018-06-11 NOTE — PROGRESS NOTE ADULT - PROBLEM SELECTOR PLAN 6
Pt currently in NSR, rate controlled. not on a/c at home  - C/w metoprolol tart and digoxin (dig level 1)  - f/u with outpt cardiologist regarding a/c

## 2018-06-11 NOTE — PROGRESS NOTE ADULT - ASSESSMENT
89 yo F from [facility] w/[facility] of dementia (A&O 1-2 at baseline), Afib not on AC, CAD, HLD, recurrent UTIs and recurrent Cdiff p/w cough/sob adm for acute hypoxic respiratory failure 2/2 aspiration pneumonia c/b UTI on pip-tazo

## 2018-06-11 NOTE — PROGRESS NOTE ADULT - PROBLEM SELECTOR PLAN 1
Pt presenting with weeks of worsening cough, with CT findings of near complete collapse of RLL and opacification of RLL and bronchus intermedius; may be 2/2 mucous plugging, lung mass in the bronchus or post-obstructive pneumonia  - c/w pip-tazo to cover for aspiration pneumonia 6/7-  - lactobacillus while on abx given hx of c diff  - Chest PT q6h for mucous plugging, chest vest atc  - Check sputum culture  - Duoneb q6h, - dextromethorphan PRN  - Continue supplemental oxygen PRN - deescalate as tolerates    - Aspiration precaution  - f/u BCx  - speech and swallow eval, will start with bedside eval.

## 2018-06-11 NOTE — PROGRESS NOTE ADULT - PROBLEM SELECTOR PLAN 2
- abdomen soft, non-tender on exam  - will restart diet as tolerated given evidence of bowel function.

## 2018-06-11 NOTE — SWALLOW BEDSIDE ASSESSMENT ADULT - SWALLOW EVAL: DIAGNOSIS
SLP spoke with Pt's daughter Mahi (who is a nurse) via telephone to explain rationale for swallowing consult.  However, Pt's daughter does not wish for swallowing evaluation to be performed at this time on this 89 y/o Pt with dementia; daughter aware of aspiration risks and wishes for Pt to continue on regular texture diet as she reports Pt would not eat modified diet.  This service to sign off at this time; request MD to reconsult this dept should a change in status occur.
Consult received and chart reviewed.  Pt seen with daughter, son in law and private aide at [facility].  Pt and family provided with rationale for swallowing evaluation at this time however Pt's family wishes to defer swallowing evaluation pending further goals of care d/w MD and Pt's other daughter (who reportedly is a Nurse and will be arriving on Monday).  Per d/w Pt, family and Nsg, this service will follow up next week pending outcome of GOC discussion as Pt's family wish for Pt to return to the [facility] assisted living at this time.  Aspiration risks, s/s and precautions were reviewed with Pt/family and aide at b/s.

## 2018-06-12 LAB
CULTURE RESULTS: SIGNIFICANT CHANGE UP
CULTURE RESULTS: SIGNIFICANT CHANGE UP
SPECIMEN SOURCE: SIGNIFICANT CHANGE UP
SPECIMEN SOURCE: SIGNIFICANT CHANGE UP

## 2018-06-12 PROCEDURE — 99233 SBSQ HOSP IP/OBS HIGH 50: CPT | Mod: GC

## 2018-06-12 RX ORDER — VANCOMYCIN HCL 1 G
125 VIAL (EA) INTRAVENOUS EVERY 6 HOURS
Qty: 0 | Refills: 0 | Status: DISCONTINUED | OUTPATIENT
Start: 2018-06-12 | End: 2018-06-13

## 2018-06-12 RX ORDER — PIPERACILLIN AND TAZOBACTAM 4; .5 G/20ML; G/20ML
3.38 INJECTION, POWDER, LYOPHILIZED, FOR SOLUTION INTRAVENOUS ONCE
Qty: 0 | Refills: 0 | Status: COMPLETED | OUTPATIENT
Start: 2018-06-12 | End: 2018-06-12

## 2018-06-12 RX ORDER — SACCHAROMYCES BOULARDII 250 MG
250 POWDER IN PACKET (EA) ORAL
Qty: 0 | Refills: 0 | Status: DISCONTINUED | OUTPATIENT
Start: 2018-06-12 | End: 2018-06-13

## 2018-06-12 RX ADMIN — Medication 1 TABLET(S): at 09:45

## 2018-06-12 RX ADMIN — Medication 2.5 MILLIGRAM(S): at 21:12

## 2018-06-12 RX ADMIN — Medication 0.12 MILLIGRAM(S): at 05:04

## 2018-06-12 RX ADMIN — Medication 250 MILLIGRAM(S): at 17:40

## 2018-06-12 RX ADMIN — Medication 3 MILLILITER(S): at 23:11

## 2018-06-12 RX ADMIN — Medication 1 DROP(S): at 21:13

## 2018-06-12 RX ADMIN — Medication 125 MILLIGRAM(S): at 14:58

## 2018-06-12 RX ADMIN — PANTOPRAZOLE SODIUM 40 MILLIGRAM(S): 20 TABLET, DELAYED RELEASE ORAL at 05:04

## 2018-06-12 RX ADMIN — Medication 3 MILLIGRAM(S): at 21:13

## 2018-06-12 RX ADMIN — AMLODIPINE BESYLATE 5 MILLIGRAM(S): 2.5 TABLET ORAL at 05:04

## 2018-06-12 RX ADMIN — PIPERACILLIN AND TAZOBACTAM 25 GRAM(S): 4; .5 INJECTION, POWDER, LYOPHILIZED, FOR SOLUTION INTRAVENOUS at 05:04

## 2018-06-12 RX ADMIN — PIPERACILLIN AND TAZOBACTAM 25 GRAM(S): 4; .5 INJECTION, POWDER, LYOPHILIZED, FOR SOLUTION INTRAVENOUS at 14:58

## 2018-06-12 RX ADMIN — Medication 3 MILLILITER(S): at 17:41

## 2018-06-12 RX ADMIN — PIPERACILLIN AND TAZOBACTAM 200 GRAM(S): 4; .5 INJECTION, POWDER, LYOPHILIZED, FOR SOLUTION INTRAVENOUS at 21:13

## 2018-06-12 RX ADMIN — Medication 25 MILLIGRAM(S): at 05:04

## 2018-06-12 RX ADMIN — ESCITALOPRAM OXALATE 10 MILLIGRAM(S): 10 TABLET, FILM COATED ORAL at 13:02

## 2018-06-12 RX ADMIN — Medication 125 MILLIGRAM(S): at 21:12

## 2018-06-12 RX ADMIN — Medication 3 MILLILITER(S): at 13:00

## 2018-06-12 RX ADMIN — LATANOPROST 1 DROP(S): 0.05 SOLUTION/ DROPS OPHTHALMIC; TOPICAL at 21:12

## 2018-06-12 RX ADMIN — Medication 3 MILLILITER(S): at 05:04

## 2018-06-12 RX ADMIN — Medication 25 MILLIGRAM(S): at 17:40

## 2018-06-12 RX ADMIN — ENOXAPARIN SODIUM 40 MILLIGRAM(S): 100 INJECTION SUBCUTANEOUS at 05:03

## 2018-06-12 RX ADMIN — CLOPIDOGREL BISULFATE 75 MILLIGRAM(S): 75 TABLET, FILM COATED ORAL at 13:01

## 2018-06-12 NOTE — DIETITIAN INITIAL EVALUATION ADULT. - PROBLEM SELECTOR PLAN 7
-Spent 20-minutes total time discussing advance directive measures with daughter Mahi Kirkpatrick who is listed as health care proxy on forms from assisted living facility. Mahi reinstates patient is DNR/ DNI given patient unable to express her wishes. Emotional support provided. Questions answered. DNR/DNI form in chart. Order placed in [place].

## 2018-06-12 NOTE — DIETITIAN INITIAL EVALUATION ADULT. - OTHER INFO
Pt seen for verbal consult for food preferences. Pt seen for verbal consult for food preferences. Daughter reports Pt weighed about 122 pounds back in October/November 2017 and as per Pt's private aide, most recent wt at [facility] was 118 pounds, slight wt decrease. NKFA reported. No micronutrient coverage noted PTA. Daughter reports Pt has not been taking the Ensure Enlive in house either as she does not like it but would be willing to have regular milk c all meals.

## 2018-06-12 NOTE — DIETITIAN INITIAL EVALUATION ADULT. - DIET TYPE
Ensure Enlive x 2 daily/DASH/TLC (sodium and cholesterol restricted diet)/mechanical soft Pt previously on mechanical soft, DASH diet c Ensure Enlive x 2 daily- discussed c Dr. Gold to have diet changed/regular

## 2018-06-12 NOTE — PROGRESS NOTE ADULT - PROBLEM SELECTOR PLAN 1
Pt presenting with weeks of worsening cough, with CT findings of near complete collapse of RLL and opacification of RLL and bronchus intermedius; may be 2/2 mucous plugging, lung mass in the bronchus or post-obstructive pneumonia  - c/w pip-tazo to cover for aspiration pneumonia 6/7-6/12.   - lactobacillus while on abx given hx of c diff  - Chest PT q6h for mucous plugging, chest vest atc    - Duoneb q6h, - dextromethorphan PRN  - Continue supplemental oxygen PRN - deescalate as tolerates    - Aspiration precaution  - f/u BCx  - speech and swallow eval, will start with bedside eval. - improved s/p 5 days of pip-tazo for aspiration pneumonia 6/7-6/12.   - Chest PT q6h chest vest atc Duoneb q6h, - dextromethorphan PRN  - Continue supplemental oxygen PRN - deescalate as tolerates, Asp precaution

## 2018-06-12 NOTE — DIETITIAN INITIAL EVALUATION ADULT. - PROBLEM SELECTOR PLAN 1
Pt presenting with weeks of worsening cough, with CT findings of near complete collapse of RLL and opacification of RLL and bronchus intermedius; may be 2/2 mucous plugging, lung mass in the bronchus or post-obstructive pneumonia  - Start Zosyn to cover for possible aspiration pneumonia  - Chest PT q6h for mucous plugging  - Check sputum culture  - Duoneb q6h  - Continue supplemental oxygen PRN  - Robitussin PRN  - Speech and swallow evaluation  - Aspiration precaution  - Incentive spirometry limited as patient likely to not comply.

## 2018-06-12 NOTE — DIETITIAN INITIAL EVALUATION ADULT. - PROBLEM SELECTOR PLAN 2
UA grossly positive, pt with previous UTIs, last culture in Feb 2018 grew E. coli sensitive to Ceftriaxone but will do zosyn to cover aspiration pneumonia.   - C/w Zosyn  - F/u urine cultures

## 2018-06-12 NOTE — PHYSICAL THERAPY INITIAL EVALUATION ADULT - RANGE OF MOTION EXAMINATION, REHAB EVAL
bilateral upper extremity ROM was WFL (within functional limits)/bilateral lower extremity ROM was WFL (within functional limits)/except bilateral shoulder flexion 0-90

## 2018-06-12 NOTE — DIETITIAN INITIAL EVALUATION ADULT. - ENERGY NEEDS
Pt admitted c cough, acute respiratory failure and hypoxia likely due to aspiration PNA, noted daughter, who is HCP, has accepted risk of aspiration and declined bedside swallow evaluation at this time. ht:5'1" (stated) , wt:118 pounds, BMI:22.3 kg/m2, IBW:105 pounds +/- 10%     Pt admitted c cough, acute respiratory failure and hypoxia likely due to aspiration PNA, noted daughter, who is HCP, has accepted risk of aspiration and declined bedside swallow evaluation at this time.

## 2018-06-12 NOTE — PROGRESS NOTE ADULT - ASSESSMENT
89 yo F from [facility] w/[facility] of dementia (A&O 1-2 at baseline), Afib not on AC, CAD, HLD, recurrent UTIs and recurrent Cdiff p/w cough/sob adm for acute hypoxic respiratory failure 2/2 aspiration pneumonia c/b UTI on pip-tazo

## 2018-06-12 NOTE — DIETITIAN INITIAL EVALUATION ADULT. - ORAL INTAKE PTA
fair/Pt generally a small eater per daughter, likely given age, was avoiding excess salt at assisted living facility

## 2018-06-12 NOTE — PROGRESS NOTE ADULT - SUBJECTIVE AND OBJECTIVE BOX
Patient is a 89y old  Female who presents with a chief complaint of Cough (10 Alexandru 2018 16:59)      SUBJECTIVE / OVERNIGHT EVENTS:    Patient seen and examined at bedside. No acute events overnight.    Review of systems: No chest pain, no shortness of breath, no abdominal pain, no nausea, no vomiting, no diarrhea, no fevers, and no chills overnight.     MEDICATIONS  (STANDING):  ALBUTerol/ipratropium for Nebulization 3 milliLiter(s) Nebulizer every 6 hours  amLODIPine   Tablet 5 milliGRAM(s) Oral daily  clopidogrel Tablet 75 milliGRAM(s) Oral daily  digoxin     Tablet 0.125 milliGRAM(s) Oral daily  enoxaparin Injectable 40 milliGRAM(s) SubCutaneous every 24 hours  escitalopram 10 milliGRAM(s) Oral daily  lactobacillus acidophilus 1 Tablet(s) Oral three times a day with meals  latanoprost 0.005% Ophthalmic Solution 1 Drop(s) Both EYES at bedtime  melatonin 3 milliGRAM(s) Oral at bedtime  metoprolol tartrate 25 milliGRAM(s) Oral two times a day  pantoprazole    Tablet 40 milliGRAM(s) Oral before breakfast  piperacillin/tazobactam IVPB. 3.375 Gram(s) IV Intermittent every 8 hours  timolol 0.5% Solution 1 Drop(s) Left EYE at bedtime    MEDICATIONS  (PRN):  acetaminophen   Tablet. 650 milliGRAM(s) Oral every 6 hours PRN Mild to moderate pain  guaiFENesin    Syrup 100 milliGRAM(s) Oral every 6 hours PRN Cough  ondansetron Injectable 4 milliGRAM(s) IV Push every 8 hours PRN Nausea and/or Vomiting      T(F): 97.4 (06-12 @ 08:42), Max: 98.2 (06-11 @ 21:01)  HR: 74 (06-12 @ 08:42) (59 - 77)  BP: 151/82 (06-12 @ 08:42) (133/79 - 151/82)  RR: 16 (06-12 @ 08:42) (16 - 18)  SpO2: 96% (06-12 @ 08:42) (93% - 98%)  CAPILLARY BLOOD GLUCOSE        I&O's Summary    11 Jun 2018 07:01  -  12 Jun 2018 07:00  --------------------------------------------------------  IN: 800 mL / OUT: 0 mL / NET: 800 mL    12 Jun 2018 07:01  -  12 Jun 2018 10:11  --------------------------------------------------------  IN: 240 mL / OUT: 0 mL / NET: 240 mL        PHYSICAL EXAM:  GEN: Age appropriate, resting comfortably in bed, no acute distress, non toxic appearing, speaking in complete sentences.   HEENT: Conjunctiva and sclera normal  PULM: Lungs CTAB, no wheezes, rales, rhonchi  CV: RRR, S1S2, no MRG  Abdominal: distended. non tender.   Extremities: No edema or cyanosis  NEURO: AAOx1  Skin: No rashes, lesions      LABS:  Labs personally reviewed.                        14.2   11.79 )-----------( 288      ( 11 Jun 2018 10:02 )             43.4     Hgb Trend: 14.2<--, 15.1<--, 13.9<--, 14.1<--, 16.0<--  06-11    143  |  105  |  14  ----------------------------<  104<H>  3.2<L>   |  24  |  0.66    Ca    8.4      11 Jun 2018 07:46  Phos  2.9     06-11  Mg     2.0     06-11      Creatinine Trend: 0.66<--, 0.57<--, 0.53<--, 0.50<--, 0.60<--          Benny VelezJeffrey  PGY-1 Pager: [phone]  [phone]  Night Float:

## 2018-06-12 NOTE — DIETITIAN INITIAL EVALUATION ADULT. - FACTORS AFF FOOD INTAKE
daughter reports Pt c no chewing/swallowing issues, reports Pt has all of her own teeth and is able to tolerate regular consistency, observed Pt eating crackers; daughter reports Pt c fair appetite, will eat a good amount of foods she likes

## 2018-06-12 NOTE — DIETITIAN INITIAL EVALUATION ADULT. - NS AS NUTRI INTERV FEED ASSISTANCE
Continue to provide assistance and encouragement w/ all meals and snacks. Discussed possibility of [employer] to assist c feeding as able when family is not present as well.

## 2018-06-12 NOTE — PROGRESS NOTE ADULT - PROBLEM SELECTOR PLAN 3
-resolved   - cdiff order cancelled as no diarrhea x 2 days -resolved   - cdiff order cancelled as no diarrhea x 2 days  - will prophylaxis with vanc 125mg q6 PO for 10 days against c diff

## 2018-06-12 NOTE — PHYSICAL THERAPY INITIAL EVALUATION ADULT - PERTINENT HX OF CURRENT PROBLEM, REHAB EVAL
as per chart review: 89 yo F from [facility] w/[facility] of dementia (A&O 1-2 at baseline), Afib not on AC, CAD, HLD, recurrent UTIs and recurrent Cdiff p/w cough/sob adm for acute hypoxic respiratory failure 2/2 aspiration pneumonia c/b UTI on pip-tazo as per chart review: 89 yo F from [facility] w/PM of dementia (A&O 1-2 at baseline), Afib not on AC, CAD, HLD, recurrent UTIs and recurrent Cdiff p/w cough/sob adm for acute hypoxic respiratory failure 2/2 aspiration pneumonia c/b UTI

## 2018-06-12 NOTE — PROGRESS NOTE ADULT - PROBLEM SELECTOR PLAN 2
- abdomen soft, non-tender on exam  - will restart diet as tolerated given evidence of bowel function. - abdomen soft, non-tender on exam  - tolerating diet as tolerated given evidence of bowel function.

## 2018-06-13 VITALS
SYSTOLIC BLOOD PRESSURE: 151 MMHG | RESPIRATION RATE: 18 BRPM | HEART RATE: 58 BPM | OXYGEN SATURATION: 91 % | HEIGHT: 63 IN | WEIGHT: 105.38 LBS | TEMPERATURE: 98 F | DIASTOLIC BLOOD PRESSURE: 78 MMHG

## 2018-06-13 LAB
ANION GAP SERPL CALC-SCNC: 15 MMOL/L — SIGNIFICANT CHANGE UP (ref 5–17)
BASOPHILS # BLD AUTO: 0.02 K/UL — SIGNIFICANT CHANGE UP (ref 0–0.2)
BASOPHILS NFR BLD AUTO: 0.2 % — SIGNIFICANT CHANGE UP (ref 0–2)
BUN SERPL-MCNC: 12 MG/DL — SIGNIFICANT CHANGE UP (ref 7–23)
CALCIUM SERPL-MCNC: 8.2 MG/DL — LOW (ref 8.4–10.5)
CHLORIDE SERPL-SCNC: 106 MMOL/L — SIGNIFICANT CHANGE UP (ref 96–108)
CO2 SERPL-SCNC: 22 MMOL/L — SIGNIFICANT CHANGE UP (ref 22–31)
CREAT SERPL-MCNC: 0.57 MG/DL — SIGNIFICANT CHANGE UP (ref 0.5–1.3)
EOSINOPHIL # BLD AUTO: 0.16 K/UL — SIGNIFICANT CHANGE UP (ref 0–0.5)
EOSINOPHIL NFR BLD AUTO: 1.4 % — SIGNIFICANT CHANGE UP (ref 0–6)
GLUCOSE SERPL-MCNC: 97 MG/DL — SIGNIFICANT CHANGE UP (ref 70–99)
HCT VFR BLD CALC: 43.4 % — SIGNIFICANT CHANGE UP (ref 34.5–45)
HGB BLD-MCNC: 14.5 G/DL — SIGNIFICANT CHANGE UP (ref 11.5–15.5)
IMM GRANULOCYTES NFR BLD AUTO: 1.4 % — SIGNIFICANT CHANGE UP (ref 0–1.5)
LYMPHOCYTES # BLD AUTO: 1.44 K/UL — SIGNIFICANT CHANGE UP (ref 1–3.3)
LYMPHOCYTES # BLD AUTO: 13 % — SIGNIFICANT CHANGE UP (ref 13–44)
MAGNESIUM SERPL-MCNC: 1.8 MG/DL — SIGNIFICANT CHANGE UP (ref 1.6–2.6)
MCHC RBC-ENTMCNC: 30.9 PG — SIGNIFICANT CHANGE UP (ref 27–34)
MCHC RBC-ENTMCNC: 33.4 GM/DL — SIGNIFICANT CHANGE UP (ref 32–36)
MCV RBC AUTO: 92.3 FL — SIGNIFICANT CHANGE UP (ref 80–100)
MONOCYTES # BLD AUTO: 0.96 K/UL — HIGH (ref 0–0.9)
MONOCYTES NFR BLD AUTO: 8.7 % — SIGNIFICANT CHANGE UP (ref 2–14)
NEUTROPHILS # BLD AUTO: 8.33 K/UL — HIGH (ref 1.8–7.4)
NEUTROPHILS NFR BLD AUTO: 75.3 % — SIGNIFICANT CHANGE UP (ref 43–77)
PHOSPHATE SERPL-MCNC: 2.7 MG/DL — SIGNIFICANT CHANGE UP (ref 2.5–4.5)
PLATELET # BLD AUTO: 348 K/UL — SIGNIFICANT CHANGE UP (ref 150–400)
POTASSIUM SERPL-MCNC: 3.4 MMOL/L — LOW (ref 3.5–5.3)
POTASSIUM SERPL-SCNC: 3.4 MMOL/L — LOW (ref 3.5–5.3)
RBC # BLD: 4.7 M/UL — SIGNIFICANT CHANGE UP (ref 3.8–5.2)
RBC # FLD: 15 % — HIGH (ref 10.3–14.5)
SODIUM SERPL-SCNC: 143 MMOL/L — SIGNIFICANT CHANGE UP (ref 135–145)
WBC # BLD: 11.07 K/UL — HIGH (ref 3.8–10.5)
WBC # FLD AUTO: 11.07 K/UL — HIGH (ref 3.8–10.5)

## 2018-06-13 PROCEDURE — 71275 CT ANGIOGRAPHY CHEST: CPT

## 2018-06-13 PROCEDURE — 74018 RADEX ABDOMEN 1 VIEW: CPT

## 2018-06-13 PROCEDURE — 94640 AIRWAY INHALATION TREATMENT: CPT

## 2018-06-13 PROCEDURE — 80053 COMPREHEN METABOLIC PANEL: CPT

## 2018-06-13 PROCEDURE — 99285 EMERGENCY DEPT VISIT HI MDM: CPT | Mod: 25

## 2018-06-13 PROCEDURE — 96374 THER/PROPH/DIAG INJ IV PUSH: CPT | Mod: XU

## 2018-06-13 PROCEDURE — 93005 ELECTROCARDIOGRAM TRACING: CPT | Mod: XU

## 2018-06-13 PROCEDURE — 71045 X-RAY EXAM CHEST 1 VIEW: CPT

## 2018-06-13 PROCEDURE — 99239 HOSP IP/OBS DSCHRG MGMT >30: CPT

## 2018-06-13 PROCEDURE — 83735 ASSAY OF MAGNESIUM: CPT

## 2018-06-13 PROCEDURE — 87633 RESP VIRUS 12-25 TARGETS: CPT

## 2018-06-13 PROCEDURE — 83605 ASSAY OF LACTIC ACID: CPT

## 2018-06-13 PROCEDURE — 80162 ASSAY OF DIGOXIN TOTAL: CPT

## 2018-06-13 PROCEDURE — 85027 COMPLETE CBC AUTOMATED: CPT

## 2018-06-13 PROCEDURE — 81001 URINALYSIS AUTO W/SCOPE: CPT

## 2018-06-13 PROCEDURE — 87798 DETECT AGENT NOS DNA AMP: CPT

## 2018-06-13 PROCEDURE — 84100 ASSAY OF PHOSPHORUS: CPT

## 2018-06-13 PROCEDURE — 82435 ASSAY OF BLOOD CHLORIDE: CPT

## 2018-06-13 PROCEDURE — 82803 BLOOD GASES ANY COMBINATION: CPT

## 2018-06-13 PROCEDURE — 85014 HEMATOCRIT: CPT

## 2018-06-13 PROCEDURE — 84132 ASSAY OF SERUM POTASSIUM: CPT

## 2018-06-13 PROCEDURE — 84295 ASSAY OF SERUM SODIUM: CPT

## 2018-06-13 PROCEDURE — 87186 SC STD MICRODIL/AGAR DIL: CPT

## 2018-06-13 PROCEDURE — 87086 URINE CULTURE/COLONY COUNT: CPT

## 2018-06-13 PROCEDURE — 87040 BLOOD CULTURE FOR BACTERIA: CPT

## 2018-06-13 PROCEDURE — 51701 INSERT BLADDER CATHETER: CPT

## 2018-06-13 PROCEDURE — 82962 GLUCOSE BLOOD TEST: CPT

## 2018-06-13 PROCEDURE — 80048 BASIC METABOLIC PNL TOTAL CA: CPT

## 2018-06-13 PROCEDURE — 82947 ASSAY GLUCOSE BLOOD QUANT: CPT

## 2018-06-13 PROCEDURE — 82330 ASSAY OF CALCIUM: CPT

## 2018-06-13 PROCEDURE — 87486 CHLMYD PNEUM DNA AMP PROBE: CPT

## 2018-06-13 PROCEDURE — 97161 PT EVAL LOW COMPLEX 20 MIN: CPT

## 2018-06-13 PROCEDURE — 87581 M.PNEUMON DNA AMP PROBE: CPT

## 2018-06-13 RX ORDER — ESCITALOPRAM OXALATE 10 MG/1
1 TABLET, FILM COATED ORAL
Qty: 0 | Refills: 0 | COMMUNITY

## 2018-06-13 RX ORDER — SACCHAROMYCES BOULARDII 250 MG
1 POWDER IN PACKET (EA) ORAL
Qty: 10 | Refills: 0 | OUTPATIENT
Start: 2018-06-13

## 2018-06-13 RX ORDER — TIMOLOL 0.5 %
1 DROPS OPHTHALMIC (EYE)
Qty: 0 | Refills: 0 | COMMUNITY
Start: 2018-06-13

## 2018-06-13 RX ORDER — METOPROLOL TARTRATE 50 MG
1 TABLET ORAL
Qty: 0 | Refills: 0 | COMMUNITY

## 2018-06-13 RX ORDER — ESCITALOPRAM OXALATE 10 MG/1
1 TABLET, FILM COATED ORAL
Qty: 0 | Refills: 0 | COMMUNITY
Start: 2018-06-13

## 2018-06-13 RX ORDER — LATANOPROST 0.05 MG/ML
1 SOLUTION/ DROPS OPHTHALMIC; TOPICAL
Qty: 0 | Refills: 0 | COMMUNITY
Start: 2018-06-13

## 2018-06-13 RX ORDER — METOPROLOL TARTRATE 50 MG
1 TABLET ORAL
Qty: 0 | Refills: 0 | COMMUNITY
Start: 2018-06-13

## 2018-06-13 RX ORDER — DIGOXIN 250 MCG
1 TABLET ORAL
Qty: 0 | Refills: 0 | COMMUNITY

## 2018-06-13 RX ORDER — DIGOXIN 250 MCG
1 TABLET ORAL
Qty: 0 | Refills: 0 | COMMUNITY
Start: 2018-06-13

## 2018-06-13 RX ORDER — AMLODIPINE BESYLATE 2.5 MG/1
1 TABLET ORAL
Qty: 30 | Refills: 0 | OUTPATIENT
Start: 2018-06-13

## 2018-06-13 RX ORDER — VANCOMYCIN HCL 1 G
1 VIAL (EA) INTRAVENOUS
Qty: 27 | Refills: 0 | OUTPATIENT
Start: 2018-06-13 | End: 2018-06-19

## 2018-06-13 RX ORDER — TIMOLOL 0.5 %
1 DROPS OPHTHALMIC (EYE)
Qty: 0 | Refills: 0 | COMMUNITY

## 2018-06-13 RX ORDER — LATANOPROST 0.05 MG/ML
1 SOLUTION/ DROPS OPHTHALMIC; TOPICAL
Qty: 0 | Refills: 0 | COMMUNITY

## 2018-06-13 RX ADMIN — Medication 125 MILLIGRAM(S): at 01:08

## 2018-06-13 RX ADMIN — Medication 0.12 MILLIGRAM(S): at 05:00

## 2018-06-13 RX ADMIN — Medication 25 MILLIGRAM(S): at 05:00

## 2018-06-13 RX ADMIN — PANTOPRAZOLE SODIUM 40 MILLIGRAM(S): 20 TABLET, DELAYED RELEASE ORAL at 05:00

## 2018-06-13 RX ADMIN — CLOPIDOGREL BISULFATE 75 MILLIGRAM(S): 75 TABLET, FILM COATED ORAL at 12:38

## 2018-06-13 RX ADMIN — ENOXAPARIN SODIUM 40 MILLIGRAM(S): 100 INJECTION SUBCUTANEOUS at 05:01

## 2018-06-13 RX ADMIN — AMLODIPINE BESYLATE 5 MILLIGRAM(S): 2.5 TABLET ORAL at 05:00

## 2018-06-13 RX ADMIN — Medication 125 MILLIGRAM(S): at 13:53

## 2018-06-13 RX ADMIN — Medication 3 MILLILITER(S): at 05:57

## 2018-06-13 RX ADMIN — ESCITALOPRAM OXALATE 10 MILLIGRAM(S): 10 TABLET, FILM COATED ORAL at 12:38

## 2018-06-13 RX ADMIN — Medication 125 MILLIGRAM(S): at 10:51

## 2018-06-13 RX ADMIN — Medication 250 MILLIGRAM(S): at 05:01

## 2018-06-13 RX ADMIN — Medication 3 MILLILITER(S): at 12:38

## 2018-06-13 NOTE — PROGRESS NOTE ADULT - PROBLEM SELECTOR PLAN 1
- improved s/p 5 days of pip-tazo for aspiration pneumonia 6/7-6/12.   - Chest PT q6h chest vest atc Duoneb q6h, - dextromethorphan PRN  - Continue supplemental oxygen PRN - deescalate as tolerates, Asp precaution

## 2018-06-13 NOTE — PROGRESS NOTE ADULT - PROBLEM SELECTOR PROBLEM 9
Need for prophylactic measure
Advance care planning
Need for prophylactic measure

## 2018-06-13 NOTE — PROGRESS NOTE ADULT - PROBLEM SELECTOR PROBLEM 1
Acute respiratory failure with hypoxia

## 2018-06-13 NOTE — PROGRESS NOTE ADULT - ATTENDING COMMENTS
Pneumonia likely aspiration in patient at high risk for resistant gram negative bacteria.  Acute respiratory failure resolved.  Improved on pip/tazo, completed 5 days Rx, has done well after pip tazo stopped.   Patients daugter very concerend about recurrent cdiff whish she has had several time in the past "every time she takes antibiotics)  --Start oral vancomycin 125mg every 6hours to prevent c diff (has had multiple episodes post antibiotics)  --Change probiotic from acidophillus to florastor.  UTI due to fairly sensitive ecoli.  Resolved.  Dysphagia persists.  Some concerns about starting dysphagia diet as this may creat problem returning to [facility] living facility  Family wants to maintain normal diet will not accept dysphagia diets.  They understand risk of aspiration  .    Discharge to [facility] today  Care discussed in detail with patient's daughter.  All questions and concerns addressed  Discharge item 45 minutes    Colton Lucia MD  Attending Hospitalist  [phone]
Pneumonia likely aspiration in patient at high risk for resistant gram negative bacteria.  Acute respiratory failure resolved.  Improving on pip/tazo, to complete 5 days Rx and then observe   -medication requires ongoing  monitoring of WBC, electrolytes and renal function  UTI due to fairly sensitive ecoli.  On pip tazo for problem above, will cover this well.  5 days Rx will suffice.  Dysphagia persists.  Some concerns about starting dysphagia diet as this may creat problem returning to [facility] assisted living facility  Plan to discuss with family when they come by today
Pneumonia likely aspiration in patient at high risk for resistant gram negative bacteria.  Acute respiratory failure resolved.  Improving on pip/tazo, to complete 5 days Rx and then observe   -medication requires ongoing  monitoring of WBC, electrolytes and renal function  --Start oral vancomycin 125mg every 6hours to prevent c diff (has had multiple episodes post antibiotics)  --Change probiotic from acidophillus to florastor.  UTI due to fairly sensitive ecoli.  On pip tazo for problem above, will cover this well.  5 days Rx will suffice.  Dysphagia persists.  Some concerns about starting dysphagia diet as this may creat problem returning to [facility] assisted living facility  Family wants to maintain normal diet will not accept dysphagia diets.  They understand risk of aspiration  .    Targeting Wednesday of discharge to assisted living.  PT benita

## 2018-06-13 NOTE — PROGRESS NOTE ADULT - PROBLEM SELECTOR PLAN 2
- abdomen soft, non-tender on exam  - tolerating diet as tolerated given evidence of bowel function.

## 2018-06-13 NOTE — PROGRESS NOTE ADULT - PROBLEM SELECTOR PLAN 4
UA grossly positive, pt with previous UTIs, last culture in Feb 2018 grew E. coli sensitive to Ceftriaxone but will do pip-tazo to cover aspiration pneumonia. Currently growing Ecoli. UA grossly positive, pt with previous UTIs, last culture in Feb 2018 grew E. coli sensitive to Ceftriaxone but will do pip-tazo to cover aspiration pneumonia. Currently growing Ecoli.  Now resolved

## 2018-06-13 NOTE — PROGRESS NOTE ADULT - SUBJECTIVE AND OBJECTIVE BOX
Patient is a 89y old  Female who presents with a chief complaint of Cough (10 Alexandru 2018 16:59)      SUBJECTIVE / OVERNIGHT EVENTS:    Patient seen and examined at bedside. No acute events overnight.    Review of systems: No chest pain, no shortness of breath, no abdominal pain, no nausea, no vomiting, no diarrhea, no fevers, and no chills overnight.     MEDICATIONS  (STANDING):  ALBUTerol/ipratropium for Nebulization 3 milliLiter(s) Nebulizer every 6 hours  amLODIPine   Tablet 5 milliGRAM(s) Oral daily  clopidogrel Tablet 75 milliGRAM(s) Oral daily  digoxin     Tablet 0.125 milliGRAM(s) Oral daily  enalapril 2.5 milliGRAM(s) Oral at bedtime  enoxaparin Injectable 40 milliGRAM(s) SubCutaneous every 24 hours  escitalopram 10 milliGRAM(s) Oral daily  latanoprost 0.005% Ophthalmic Solution 1 Drop(s) Both EYES at bedtime  melatonin 3 milliGRAM(s) Oral at bedtime  metoprolol tartrate 25 milliGRAM(s) Oral two times a day  pantoprazole    Tablet 40 milliGRAM(s) Oral before breakfast  saccharomyces boulardii 250 milliGRAM(s) Oral two times a day  timolol 0.5% Solution 1 Drop(s) Left EYE at bedtime  vancomycin    Solution 125 milliGRAM(s) Oral every 6 hours    MEDICATIONS  (PRN):  acetaminophen   Tablet. 650 milliGRAM(s) Oral every 6 hours PRN Mild to moderate pain  guaiFENesin    Syrup 100 milliGRAM(s) Oral every 6 hours PRN Cough  ondansetron Injectable 4 milliGRAM(s) IV Push every 8 hours PRN Nausea and/or Vomiting      T(F): 98.1 (06-13 @ 09:09), Max: 98.1 (06-13 @ 09:09)  HR: 58 (06-13 @ 09:09) (57 - 83)  BP: 151/78 (06-13 @ 09:09) (120/83 - 161/80)  RR: 18 (06-13 @ 09:09) (16 - 18)  SpO2: 91% (06-13 @ 09:09) (91% - 96%)  CAPILLARY BLOOD GLUCOSE        I&O's Summary    12 Jun 2018 07:01  -  13 Jun 2018 07:00  --------------------------------------------------------  IN: 440 mL / OUT: 150 mL / NET: 290 mL        PHYSICAL EXAM:  GEN: Age appropriate, resting comfortably in bed, no acute distress, non toxic appearing, speaking in complete sentences.   HEENT: Conjunctiva and sclera normal  PULM: Lungs CTAB, no wheezes, rales, rhonchi  CV: RRR, S1S2, no MRG  Abdominal: Soft, nontender to palpation, non-distended, normoactive bowel sounds  Extremities: No edema or cyanosis  NEURO: AAOx3  Skin: No rashes, lesions      LABS:  Labs personally reviewed.                        14.5   11.07 )-----------( 348      ( 13 Jun 2018 07:41 )             43.4     Hgb Trend: 14.5<--, 14.2<--, 15.1<--, 13.9<--, 14.1<--  06-13    143  |  106  |  12  ----------------------------<  97  3.4<L>   |  22  |  0.57    Ca    8.2<L>      13 Jun 2018 06:32  Phos  2.7     06-13  Mg     1.8     06-13      Creatinine Trend: 0.57<--, 0.66<--, 0.57<--, 0.53<--, 0.50<--, 0.60<--          Benny [facility]  PGY-1 Pager: [phone]  [phone]  Night Float:

## 2018-06-13 NOTE — PROGRESS NOTE ADULT - PROBLEM SELECTOR PROBLEM 2
Ileus
Urinary tract infection without hematuria, site unspecified
Ileus
Ileus
Urinary tract infection without hematuria, site unspecified
Ileus

## 2018-06-13 NOTE — PROGRESS NOTE ADULT - PROBLEM SELECTOR PLAN 10
DVT ppx: enoxaparin  Diet: Mechanical soft  PT consult  out of bed to chair daily  B/L adrenal gland thickening on imaging - monitor as outpt  Dispo: Likely back to [facility]
DVT ppx: Lovenox  Diet: Mechanical soft  PT consult  out of bed to chair daily  B/L adrenal gland thickening on imaging - monitor as outpt  Dispo: Likely back to [facility]
DVT ppx: enoxaparin  Diet: Mechanical soft  PT consult  out of bed to chair daily  B/L adrenal gland thickening on imaging - monitor as outpt  Dispo: Likely back to [facility]
DVT ppx: enoxaparin  Diet: Mechanical soft  PT consult  out of bed to chair daily  B/L adrenal gland thickening on imaging - monitor as outpt  Dispo: Likely back to [facility]

## 2018-06-13 NOTE — PROGRESS NOTE ADULT - ASSESSMENT
89 yo F from [facility] w/PM of dementia (A&O 1-2 at baseline), Afib not on AC, CAD, HLD, recurrent UTIs and recurrent Cdiff p/w cough/sob adm for acute hypoxic respiratory failure 2/2 aspiration pneumonia c/b UTI. Improved s/p 5 days of pip-tazo

## 2018-06-13 NOTE — PROVIDER CONTACT NOTE (OTHER) - ASSESSMENT
no active bleed, no c/o acute pain, skin flap intact
Patient is on IV antibiotics, per the daughter, pt develops c-diff every time she is put on IV abx.

## 2019-01-01 ENCOUNTER — EMERGENCY (EMERGENCY)
Facility: HOSPITAL | Age: 84
LOS: 1 days | Discharge: HOME | End: 2019-01-01
Attending: EMERGENCY MEDICINE
Payer: MEDICARE

## 2019-01-01 ENCOUNTER — INPATIENT (INPATIENT)
Facility: HOSPITAL | Age: 84
LOS: 12 days | Discharge: HOME | DRG: 193 | End: 2019-04-12
Attending: INTERNAL MEDICINE | Admitting: INTERNAL MEDICINE
Payer: MEDICARE

## 2019-01-01 ENCOUNTER — EMERGENCY (EMERGENCY)
Facility: HOSPITAL | Age: 84
LOS: 1 days | Discharge: HOME | End: 2019-01-01
Attending: EMERGENCY MEDICINE | Admitting: EMERGENCY MEDICINE
Payer: MEDICARE

## 2019-01-01 VITALS
DIASTOLIC BLOOD PRESSURE: 69 MMHG | TEMPERATURE: 98 F | OXYGEN SATURATION: 98 % | HEART RATE: 64 BPM | HEIGHT: 64 IN | RESPIRATION RATE: 18 BRPM | WEIGHT: 102.96 LBS | SYSTOLIC BLOOD PRESSURE: 117 MMHG

## 2019-01-01 VITALS
HEART RATE: 80 BPM | OXYGEN SATURATION: 96 % | SYSTOLIC BLOOD PRESSURE: 128 MMHG | RESPIRATION RATE: 18 BRPM | DIASTOLIC BLOOD PRESSURE: 80 MMHG

## 2019-01-01 VITALS
SYSTOLIC BLOOD PRESSURE: 128 MMHG | DIASTOLIC BLOOD PRESSURE: 70 MMHG | OXYGEN SATURATION: 95 % | RESPIRATION RATE: 18 BRPM | HEIGHT: 62 IN | WEIGHT: 119.93 LBS | HEART RATE: 72 BPM

## 2019-01-01 VITALS
RESPIRATION RATE: 20 BRPM | OXYGEN SATURATION: 94 % | HEART RATE: 88 BPM | TEMPERATURE: 99 F | SYSTOLIC BLOOD PRESSURE: 133 MMHG | DIASTOLIC BLOOD PRESSURE: 74 MMHG

## 2019-01-01 VITALS
TEMPERATURE: 98 F | DIASTOLIC BLOOD PRESSURE: 62 MMHG | HEART RATE: 64 BPM | SYSTOLIC BLOOD PRESSURE: 118 MMHG | RESPIRATION RATE: 18 BRPM | OXYGEN SATURATION: 96 %

## 2019-01-01 VITALS
RESPIRATION RATE: 16 BRPM | OXYGEN SATURATION: 97 % | SYSTOLIC BLOOD PRESSURE: 149 MMHG | TEMPERATURE: 98 F | DIASTOLIC BLOOD PRESSURE: 77 MMHG | HEART RATE: 62 BPM

## 2019-01-01 DIAGNOSIS — M25.569 PAIN IN UNSPECIFIED KNEE: ICD-10-CM

## 2019-01-01 DIAGNOSIS — J18.9 PNEUMONIA, UNSPECIFIED ORGANISM: ICD-10-CM

## 2019-01-01 DIAGNOSIS — I48.91 UNSPECIFIED ATRIAL FIBRILLATION: ICD-10-CM

## 2019-01-01 DIAGNOSIS — F05 DELIRIUM DUE TO KNOWN PHYSIOLOGICAL CONDITION: ICD-10-CM

## 2019-01-01 DIAGNOSIS — F03.90 UNSPECIFIED DEMENTIA WITHOUT BEHAVIORAL DISTURBANCE: ICD-10-CM

## 2019-01-01 DIAGNOSIS — I10 ESSENTIAL (PRIMARY) HYPERTENSION: ICD-10-CM

## 2019-01-01 DIAGNOSIS — F03.91 UNSPECIFIED DEMENTIA WITH BEHAVIORAL DISTURBANCE: ICD-10-CM

## 2019-01-01 DIAGNOSIS — E78.5 HYPERLIPIDEMIA, UNSPECIFIED: ICD-10-CM

## 2019-01-01 DIAGNOSIS — Z51.5 ENCOUNTER FOR PALLIATIVE CARE: ICD-10-CM

## 2019-01-01 DIAGNOSIS — R13.10 DYSPHAGIA, UNSPECIFIED: ICD-10-CM

## 2019-01-01 DIAGNOSIS — J69.0 PNEUMONITIS DUE TO INHALATION OF FOOD AND VOMIT: ICD-10-CM

## 2019-01-01 DIAGNOSIS — I25.10 ATHEROSCLEROTIC HEART DISEASE OF NATIVE CORONARY ARTERY WITHOUT ANGINA PECTORIS: ICD-10-CM

## 2019-01-01 LAB
-  AMIKACIN: SIGNIFICANT CHANGE UP
-  AMPICILLIN/SULBACTAM: SIGNIFICANT CHANGE UP
-  AMPICILLIN: SIGNIFICANT CHANGE UP
-  AZTREONAM: SIGNIFICANT CHANGE UP
-  CEFAZOLIN: SIGNIFICANT CHANGE UP
-  CEFEPIME: SIGNIFICANT CHANGE UP
-  CEFOXITIN: SIGNIFICANT CHANGE UP
-  CEFTRIAXONE: SIGNIFICANT CHANGE UP
-  CIPROFLOXACIN: SIGNIFICANT CHANGE UP
-  ERTAPENEM: SIGNIFICANT CHANGE UP
-  GENTAMICIN: SIGNIFICANT CHANGE UP
-  IMIPENEM: SIGNIFICANT CHANGE UP
-  LEVOFLOXACIN: SIGNIFICANT CHANGE UP
-  MEROPENEM: SIGNIFICANT CHANGE UP
-  NITROFURANTOIN: SIGNIFICANT CHANGE UP
-  PIPERACILLIN/TAZOBACTAM: SIGNIFICANT CHANGE UP
-  TIGECYCLINE: SIGNIFICANT CHANGE UP
-  TOBRAMYCIN: SIGNIFICANT CHANGE UP
-  TRIMETHOPRIM/SULFAMETHOXAZOLE: SIGNIFICANT CHANGE UP
ALBUMIN SERPL ELPH-MCNC: 3.4 G/DL — SIGNIFICANT CHANGE UP (ref 3.3–5)
ALBUMIN SERPL ELPH-MCNC: 4.3 G/DL — SIGNIFICANT CHANGE UP (ref 3.3–5)
ALP SERPL-CCNC: 75 U/L — SIGNIFICANT CHANGE UP (ref 40–120)
ALP SERPL-CCNC: 77 U/L — SIGNIFICANT CHANGE UP (ref 40–120)
ALT FLD-CCNC: 10 U/L — SIGNIFICANT CHANGE UP (ref 10–45)
ALT FLD-CCNC: 11 U/L — SIGNIFICANT CHANGE UP (ref 10–45)
ANION GAP SERPL CALC-SCNC: 13 MMOL/L — SIGNIFICANT CHANGE UP (ref 5–17)
ANION GAP SERPL CALC-SCNC: 14 MMOL/L — SIGNIFICANT CHANGE UP (ref 5–17)
ANION GAP SERPL CALC-SCNC: 15 MMOL/L — SIGNIFICANT CHANGE UP (ref 5–17)
ANION GAP SERPL CALC-SCNC: 16 MMOL/L — SIGNIFICANT CHANGE UP (ref 5–17)
APPEARANCE UR: ABNORMAL
APTT BLD: 21.3 SEC — LOW (ref 27.5–36.3)
APTT BLD: 26.9 SEC — LOW (ref 27.5–36.3)
AST SERPL-CCNC: 18 U/L — SIGNIFICANT CHANGE UP (ref 10–40)
AST SERPL-CCNC: 19 U/L — SIGNIFICANT CHANGE UP (ref 10–40)
BASE EXCESS BLDV CALC-SCNC: 2.1 MMOL/L — HIGH (ref -2–2)
BASOPHILS # BLD AUTO: 0.05 K/UL — SIGNIFICANT CHANGE UP (ref 0–0.2)
BASOPHILS # BLD AUTO: 0.1 K/UL — SIGNIFICANT CHANGE UP (ref 0–0.2)
BASOPHILS # BLD AUTO: 0.1 K/UL — SIGNIFICANT CHANGE UP (ref 0–0.2)
BASOPHILS NFR BLD AUTO: 0.5 % — SIGNIFICANT CHANGE UP (ref 0–2)
BILIRUB SERPL-MCNC: 0.4 MG/DL — SIGNIFICANT CHANGE UP (ref 0.2–1.2)
BILIRUB SERPL-MCNC: 0.7 MG/DL — SIGNIFICANT CHANGE UP (ref 0.2–1.2)
BILIRUB UR-MCNC: NEGATIVE — SIGNIFICANT CHANGE UP
BLD GP AB SCN SERPL QL: NEGATIVE — SIGNIFICANT CHANGE UP
BUN SERPL-MCNC: 10 MG/DL — SIGNIFICANT CHANGE UP (ref 7–23)
BUN SERPL-MCNC: 11 MG/DL — SIGNIFICANT CHANGE UP (ref 7–23)
BUN SERPL-MCNC: 12 MG/DL — SIGNIFICANT CHANGE UP (ref 7–23)
BUN SERPL-MCNC: 13 MG/DL — SIGNIFICANT CHANGE UP (ref 7–23)
BUN SERPL-MCNC: 14 MG/DL — SIGNIFICANT CHANGE UP (ref 7–23)
BUN SERPL-MCNC: 23 MG/DL — SIGNIFICANT CHANGE UP (ref 7–23)
CA-I SERPL-SCNC: 1.21 MMOL/L — SIGNIFICANT CHANGE UP (ref 1.12–1.3)
CALCIUM SERPL-MCNC: 8.5 MG/DL — SIGNIFICANT CHANGE UP (ref 8.4–10.5)
CALCIUM SERPL-MCNC: 8.9 MG/DL — SIGNIFICANT CHANGE UP (ref 8.4–10.5)
CALCIUM SERPL-MCNC: 8.9 MG/DL — SIGNIFICANT CHANGE UP (ref 8.4–10.5)
CALCIUM SERPL-MCNC: 9 MG/DL — SIGNIFICANT CHANGE UP (ref 8.4–10.5)
CALCIUM SERPL-MCNC: 9.1 MG/DL — SIGNIFICANT CHANGE UP (ref 8.4–10.5)
CALCIUM SERPL-MCNC: 9.6 MG/DL — SIGNIFICANT CHANGE UP (ref 8.4–10.5)
CHLORIDE BLDV-SCNC: 108 MMOL/L — SIGNIFICANT CHANGE UP (ref 96–108)
CHLORIDE SERPL-SCNC: 103 MMOL/L — SIGNIFICANT CHANGE UP (ref 96–108)
CHLORIDE SERPL-SCNC: 104 MMOL/L — SIGNIFICANT CHANGE UP (ref 96–108)
CHLORIDE SERPL-SCNC: 106 MMOL/L — SIGNIFICANT CHANGE UP (ref 96–108)
CO2 BLDV-SCNC: 28 MMOL/L — SIGNIFICANT CHANGE UP (ref 22–30)
CO2 SERPL-SCNC: 21 MMOL/L — LOW (ref 22–31)
CO2 SERPL-SCNC: 21 MMOL/L — LOW (ref 22–31)
CO2 SERPL-SCNC: 24 MMOL/L — SIGNIFICANT CHANGE UP (ref 22–31)
CO2 SERPL-SCNC: 25 MMOL/L — SIGNIFICANT CHANGE UP (ref 22–31)
CO2 SERPL-SCNC: 26 MMOL/L — SIGNIFICANT CHANGE UP (ref 22–31)
CO2 SERPL-SCNC: 26 MMOL/L — SIGNIFICANT CHANGE UP (ref 22–31)
COLOR SPEC: YELLOW — SIGNIFICANT CHANGE UP
CREAT SERPL-MCNC: 0.47 MG/DL — LOW (ref 0.5–1.3)
CREAT SERPL-MCNC: 0.58 MG/DL — SIGNIFICANT CHANGE UP (ref 0.5–1.3)
CREAT SERPL-MCNC: 0.68 MG/DL — SIGNIFICANT CHANGE UP (ref 0.5–1.3)
CREAT SERPL-MCNC: 0.72 MG/DL — SIGNIFICANT CHANGE UP (ref 0.5–1.3)
CREAT SERPL-MCNC: 0.76 MG/DL — SIGNIFICANT CHANGE UP (ref 0.5–1.3)
CREAT SERPL-MCNC: 0.76 MG/DL — SIGNIFICANT CHANGE UP (ref 0.5–1.3)
CULTURE RESULTS: SIGNIFICANT CHANGE UP
DIFF PNL FLD: NEGATIVE — SIGNIFICANT CHANGE UP
EOSINOPHIL # BLD AUTO: 0.1 K/UL — SIGNIFICANT CHANGE UP (ref 0–0.5)
EOSINOPHIL # BLD AUTO: 0.19 K/UL — SIGNIFICANT CHANGE UP (ref 0–0.5)
EOSINOPHIL # BLD AUTO: 0.2 K/UL — SIGNIFICANT CHANGE UP (ref 0–0.5)
EOSINOPHIL NFR BLD AUTO: 1.3 % — SIGNIFICANT CHANGE UP (ref 0–6)
EOSINOPHIL NFR BLD AUTO: 1.4 % — SIGNIFICANT CHANGE UP (ref 0–6)
EOSINOPHIL NFR BLD AUTO: 2.1 % — SIGNIFICANT CHANGE UP (ref 0–6)
GAS PNL BLDV: 140 MMOL/L — SIGNIFICANT CHANGE UP (ref 136–145)
GAS PNL BLDV: SIGNIFICANT CHANGE UP
GLUCOSE BLDV-MCNC: 88 MG/DL — SIGNIFICANT CHANGE UP (ref 70–99)
GLUCOSE SERPL-MCNC: 103 MG/DL — HIGH (ref 70–99)
GLUCOSE SERPL-MCNC: 106 MG/DL — HIGH (ref 70–99)
GLUCOSE SERPL-MCNC: 106 MG/DL — HIGH (ref 70–99)
GLUCOSE SERPL-MCNC: 64 MG/DL — LOW (ref 70–99)
GLUCOSE SERPL-MCNC: 90 MG/DL — SIGNIFICANT CHANGE UP (ref 70–99)
GLUCOSE SERPL-MCNC: 97 MG/DL — SIGNIFICANT CHANGE UP (ref 70–99)
GLUCOSE UR QL: NEGATIVE — SIGNIFICANT CHANGE UP
HCO3 BLDV-SCNC: 27 MMOL/L — SIGNIFICANT CHANGE UP (ref 21–29)
HCT VFR BLD CALC: 40.8 % — SIGNIFICANT CHANGE UP (ref 34.5–45)
HCT VFR BLD CALC: 42.6 % — SIGNIFICANT CHANGE UP (ref 34.5–45)
HCT VFR BLD CALC: 42.8 % — SIGNIFICANT CHANGE UP (ref 34.5–45)
HCT VFR BLD CALC: 43.2 % — SIGNIFICANT CHANGE UP (ref 34.5–45)
HCT VFR BLD CALC: 43.7 % — SIGNIFICANT CHANGE UP (ref 34.5–45)
HCT VFR BLD CALC: 49.5 % — HIGH (ref 34.5–45)
HCT VFR BLD CALC: 49.6 % — HIGH (ref 34.5–45)
HCT VFR BLDA CALC: 42 % — SIGNIFICANT CHANGE UP (ref 39–50)
HGB BLD CALC-MCNC: 13.6 G/DL — SIGNIFICANT CHANGE UP (ref 11.5–15.5)
HGB BLD-MCNC: 13 G/DL — SIGNIFICANT CHANGE UP (ref 11.5–15.5)
HGB BLD-MCNC: 13.7 G/DL — SIGNIFICANT CHANGE UP (ref 11.5–15.5)
HGB BLD-MCNC: 13.9 G/DL — SIGNIFICANT CHANGE UP (ref 11.5–15.5)
HGB BLD-MCNC: 14 G/DL — SIGNIFICANT CHANGE UP (ref 11.5–15.5)
HGB BLD-MCNC: 14.7 G/DL — SIGNIFICANT CHANGE UP (ref 11.5–15.5)
HGB BLD-MCNC: 15.6 G/DL — HIGH (ref 11.5–15.5)
HGB BLD-MCNC: 16.3 G/DL — HIGH (ref 11.5–15.5)
IMM GRANULOCYTES NFR BLD AUTO: 0.7 % — SIGNIFICANT CHANGE UP (ref 0–1.5)
INR BLD: 1.03 RATIO — SIGNIFICANT CHANGE UP (ref 0.88–1.16)
INR BLD: 1.14 RATIO — SIGNIFICANT CHANGE UP (ref 0.88–1.16)
KETONES UR-MCNC: SIGNIFICANT CHANGE UP
LACTATE BLDV-MCNC: 2.2 MMOL/L — HIGH (ref 0.7–2)
LEUKOCYTE ESTERASE UR-ACNC: ABNORMAL
LYMPHOCYTES # BLD AUTO: 1 K/UL — SIGNIFICANT CHANGE UP (ref 1–3.3)
LYMPHOCYTES # BLD AUTO: 1.13 K/UL — SIGNIFICANT CHANGE UP (ref 1–3.3)
LYMPHOCYTES # BLD AUTO: 1.7 K/UL — SIGNIFICANT CHANGE UP (ref 1–3.3)
LYMPHOCYTES # BLD AUTO: 12.4 % — LOW (ref 13–44)
LYMPHOCYTES # BLD AUTO: 16.4 % — SIGNIFICANT CHANGE UP (ref 13–44)
LYMPHOCYTES # BLD AUTO: 7.6 % — LOW (ref 13–44)
MAGNESIUM SERPL-MCNC: 2 MG/DL — SIGNIFICANT CHANGE UP (ref 1.6–2.6)
MCHC RBC-ENTMCNC: 31.5 GM/DL — LOW (ref 32–36)
MCHC RBC-ENTMCNC: 31.5 PG — SIGNIFICANT CHANGE UP (ref 27–34)
MCHC RBC-ENTMCNC: 31.6 PG — SIGNIFICANT CHANGE UP (ref 27–34)
MCHC RBC-ENTMCNC: 31.6 PG — SIGNIFICANT CHANGE UP (ref 27–34)
MCHC RBC-ENTMCNC: 31.9 GM/DL — LOW (ref 32–36)
MCHC RBC-ENTMCNC: 32.1 PG — SIGNIFICANT CHANGE UP (ref 27–34)
MCHC RBC-ENTMCNC: 32.2 GM/DL — SIGNIFICANT CHANGE UP (ref 32–36)
MCHC RBC-ENTMCNC: 32.3 PG — SIGNIFICANT CHANGE UP (ref 27–34)
MCHC RBC-ENTMCNC: 32.4 GM/DL — SIGNIFICANT CHANGE UP (ref 32–36)
MCHC RBC-ENTMCNC: 32.4 PG — SIGNIFICANT CHANGE UP (ref 27–34)
MCHC RBC-ENTMCNC: 32.5 GM/DL — SIGNIFICANT CHANGE UP (ref 32–36)
MCHC RBC-ENTMCNC: 32.8 GM/DL — SIGNIFICANT CHANGE UP (ref 32–36)
MCHC RBC-ENTMCNC: 33.7 GM/DL — SIGNIFICANT CHANGE UP (ref 32–36)
MCHC RBC-ENTMCNC: 33.7 PG — SIGNIFICANT CHANGE UP (ref 27–34)
MCV RBC AUTO: 100 FL — SIGNIFICANT CHANGE UP (ref 80–100)
MCV RBC AUTO: 101.9 FL — HIGH (ref 80–100)
MCV RBC AUTO: 97.5 FL — SIGNIFICANT CHANGE UP (ref 80–100)
MCV RBC AUTO: 97.9 FL — SIGNIFICANT CHANGE UP (ref 80–100)
MCV RBC AUTO: 98.7 FL — SIGNIFICANT CHANGE UP (ref 80–100)
MCV RBC AUTO: 99.3 FL — SIGNIFICANT CHANGE UP (ref 80–100)
MCV RBC AUTO: 99.3 FL — SIGNIFICANT CHANGE UP (ref 80–100)
METHOD TYPE: SIGNIFICANT CHANGE UP
MONOCYTES # BLD AUTO: 0.65 K/UL — SIGNIFICANT CHANGE UP (ref 0–0.9)
MONOCYTES # BLD AUTO: 0.7 K/UL — SIGNIFICANT CHANGE UP (ref 0–0.9)
MONOCYTES # BLD AUTO: 0.9 K/UL — SIGNIFICANT CHANGE UP (ref 0–0.9)
MONOCYTES NFR BLD AUTO: 6.5 % — SIGNIFICANT CHANGE UP (ref 2–14)
MONOCYTES NFR BLD AUTO: 6.6 % — SIGNIFICANT CHANGE UP (ref 2–14)
MONOCYTES NFR BLD AUTO: 7.1 % — SIGNIFICANT CHANGE UP (ref 2–14)
MRSA PCR RESULT.: DETECTED
NEUTROPHILS # BLD AUTO: 10.8 K/UL — HIGH (ref 1.8–7.4)
NEUTROPHILS # BLD AUTO: 7.03 K/UL — SIGNIFICANT CHANGE UP (ref 1.8–7.4)
NEUTROPHILS # BLD AUTO: 7.8 K/UL — HIGH (ref 1.8–7.4)
NEUTROPHILS NFR BLD AUTO: 75.3 % — SIGNIFICANT CHANGE UP (ref 43–77)
NEUTROPHILS NFR BLD AUTO: 77.2 % — HIGH (ref 43–77)
NEUTROPHILS NFR BLD AUTO: 83.8 % — HIGH (ref 43–77)
NITRITE UR-MCNC: POSITIVE
OB PNL STL: NEGATIVE — SIGNIFICANT CHANGE UP
ORGANISM # SPEC MICROSCOPIC CNT: SIGNIFICANT CHANGE UP
ORGANISM # SPEC MICROSCOPIC CNT: SIGNIFICANT CHANGE UP
PCO2 BLDV: 43 MMHG — SIGNIFICANT CHANGE UP (ref 35–50)
PH BLDV: 7.41 — SIGNIFICANT CHANGE UP (ref 7.35–7.45)
PH UR: 6 — SIGNIFICANT CHANGE UP (ref 5–8)
PLATELET # BLD AUTO: 251 K/UL — SIGNIFICANT CHANGE UP (ref 150–400)
PLATELET # BLD AUTO: 259 K/UL — SIGNIFICANT CHANGE UP (ref 150–400)
PLATELET # BLD AUTO: 276 K/UL — SIGNIFICANT CHANGE UP (ref 150–400)
PLATELET # BLD AUTO: 277 K/UL — SIGNIFICANT CHANGE UP (ref 150–400)
PLATELET # BLD AUTO: 301 K/UL — SIGNIFICANT CHANGE UP (ref 150–400)
PLATELET # BLD AUTO: 351 K/UL — SIGNIFICANT CHANGE UP (ref 150–400)
PLATELET # BLD AUTO: 390 K/UL — SIGNIFICANT CHANGE UP (ref 150–400)
PO2 BLDV: 43 MMHG — SIGNIFICANT CHANGE UP (ref 25–45)
POTASSIUM BLDV-SCNC: 3.7 MMOL/L — SIGNIFICANT CHANGE UP (ref 3.5–5.3)
POTASSIUM SERPL-MCNC: 2.7 MMOL/L — CRITICAL LOW (ref 3.5–5.3)
POTASSIUM SERPL-MCNC: 3.4 MMOL/L — LOW (ref 3.5–5.3)
POTASSIUM SERPL-MCNC: 3.4 MMOL/L — LOW (ref 3.5–5.3)
POTASSIUM SERPL-MCNC: 3.5 MMOL/L — SIGNIFICANT CHANGE UP (ref 3.5–5.3)
POTASSIUM SERPL-MCNC: 3.9 MMOL/L — SIGNIFICANT CHANGE UP (ref 3.5–5.3)
POTASSIUM SERPL-MCNC: 4.1 MMOL/L — SIGNIFICANT CHANGE UP (ref 3.5–5.3)
POTASSIUM SERPL-MCNC: 4.5 MMOL/L — SIGNIFICANT CHANGE UP (ref 3.5–5.3)
POTASSIUM SERPL-SCNC: 2.7 MMOL/L — CRITICAL LOW (ref 3.5–5.3)
POTASSIUM SERPL-SCNC: 3.4 MMOL/L — LOW (ref 3.5–5.3)
POTASSIUM SERPL-SCNC: 3.4 MMOL/L — LOW (ref 3.5–5.3)
POTASSIUM SERPL-SCNC: 3.5 MMOL/L — SIGNIFICANT CHANGE UP (ref 3.5–5.3)
POTASSIUM SERPL-SCNC: 3.9 MMOL/L — SIGNIFICANT CHANGE UP (ref 3.5–5.3)
POTASSIUM SERPL-SCNC: 4.1 MMOL/L — SIGNIFICANT CHANGE UP (ref 3.5–5.3)
POTASSIUM SERPL-SCNC: 4.5 MMOL/L — SIGNIFICANT CHANGE UP (ref 3.5–5.3)
PROT SERPL-MCNC: 6.6 G/DL — SIGNIFICANT CHANGE UP (ref 6–8.3)
PROT SERPL-MCNC: 7.2 G/DL — SIGNIFICANT CHANGE UP (ref 6–8.3)
PROT UR-MCNC: ABNORMAL
PROTHROM AB SERPL-ACNC: 11.8 SEC — SIGNIFICANT CHANGE UP (ref 10–12.9)
PROTHROM AB SERPL-ACNC: 13 SEC — HIGH (ref 10–12.9)
RAPID RVP RESULT: DETECTED
RBC # BLD: 4.11 M/UL — SIGNIFICANT CHANGE UP (ref 3.8–5.2)
RBC # BLD: 4.31 M/UL — SIGNIFICANT CHANGE UP (ref 3.8–5.2)
RBC # BLD: 4.35 M/UL — SIGNIFICANT CHANGE UP (ref 3.8–5.2)
RBC # BLD: 4.37 M/UL — SIGNIFICANT CHANGE UP (ref 3.8–5.2)
RBC # BLD: 4.43 M/UL — SIGNIFICANT CHANGE UP (ref 3.8–5.2)
RBC # BLD: 4.86 M/UL — SIGNIFICANT CHANGE UP (ref 3.8–5.2)
RBC # BLD: 5.03 M/UL — SIGNIFICANT CHANGE UP (ref 3.8–5.2)
RBC # FLD: 12.1 % — SIGNIFICANT CHANGE UP (ref 10.3–14.5)
RBC # FLD: 12.3 % — SIGNIFICANT CHANGE UP (ref 10.3–14.5)
RBC # FLD: 12.6 % — SIGNIFICANT CHANGE UP (ref 10.3–14.5)
RBC # FLD: 12.6 % — SIGNIFICANT CHANGE UP (ref 10.3–14.5)
RBC # FLD: 13.1 % — SIGNIFICANT CHANGE UP (ref 10.3–14.5)
RH IG SCN BLD-IMP: NEGATIVE — SIGNIFICANT CHANGE UP
RV+EV RNA SPEC QL NAA+PROBE: DETECTED
S AUREUS DNA NOSE QL NAA+PROBE: DETECTED
SAO2 % BLDV: 77 % — SIGNIFICANT CHANGE UP (ref 67–88)
SODIUM SERPL-SCNC: 140 MMOL/L — SIGNIFICANT CHANGE UP (ref 135–145)
SODIUM SERPL-SCNC: 141 MMOL/L — SIGNIFICANT CHANGE UP (ref 135–145)
SODIUM SERPL-SCNC: 142 MMOL/L — SIGNIFICANT CHANGE UP (ref 135–145)
SODIUM SERPL-SCNC: 143 MMOL/L — SIGNIFICANT CHANGE UP (ref 135–145)
SP GR SPEC: 1.03 — HIGH (ref 1.01–1.02)
SPECIMEN SOURCE: SIGNIFICANT CHANGE UP
UROBILINOGEN FLD QL: ABNORMAL
VANCOMYCIN TROUGH SERPL-MCNC: 11.3 UG/ML — SIGNIFICANT CHANGE UP (ref 10–20)
VANCOMYCIN TROUGH SERPL-MCNC: 15.8 UG/ML — SIGNIFICANT CHANGE UP (ref 10–20)
VANCOMYCIN TROUGH SERPL-MCNC: 16.3 UG/ML — SIGNIFICANT CHANGE UP (ref 10–20)
WBC # BLD: 10.4 K/UL — SIGNIFICANT CHANGE UP (ref 3.8–10.5)
WBC # BLD: 10.57 K/UL — HIGH (ref 3.8–10.5)
WBC # BLD: 11.08 K/UL — HIGH (ref 3.8–10.5)
WBC # BLD: 12.9 K/UL — HIGH (ref 3.8–10.5)
WBC # BLD: 13.29 K/UL — HIGH (ref 3.8–10.5)
WBC # BLD: 9.11 K/UL — SIGNIFICANT CHANGE UP (ref 3.8–10.5)
WBC # BLD: 9.25 K/UL — SIGNIFICANT CHANGE UP (ref 3.8–10.5)
WBC # FLD AUTO: 10.4 K/UL — SIGNIFICANT CHANGE UP (ref 3.8–10.5)
WBC # FLD AUTO: 10.57 K/UL — HIGH (ref 3.8–10.5)
WBC # FLD AUTO: 11.08 K/UL — HIGH (ref 3.8–10.5)
WBC # FLD AUTO: 12.9 K/UL — HIGH (ref 3.8–10.5)
WBC # FLD AUTO: 13.29 K/UL — HIGH (ref 3.8–10.5)
WBC # FLD AUTO: 9.11 K/UL — SIGNIFICANT CHANGE UP (ref 3.8–10.5)
WBC # FLD AUTO: 9.25 K/UL — SIGNIFICANT CHANGE UP (ref 3.8–10.5)

## 2019-01-01 PROCEDURE — 85610 PROTHROMBIN TIME: CPT

## 2019-01-01 PROCEDURE — 82272 OCCULT BLD FECES 1-3 TESTS: CPT

## 2019-01-01 PROCEDURE — 97116 GAIT TRAINING THERAPY: CPT

## 2019-01-01 PROCEDURE — 71045 X-RAY EXAM CHEST 1 VIEW: CPT | Mod: 26

## 2019-01-01 PROCEDURE — 85730 THROMBOPLASTIN TIME PARTIAL: CPT

## 2019-01-01 PROCEDURE — 87640 STAPH A DNA AMP PROBE: CPT

## 2019-01-01 PROCEDURE — 87486 CHLMYD PNEUM DNA AMP PROBE: CPT

## 2019-01-01 PROCEDURE — 86901 BLOOD TYPING SEROLOGIC RH(D): CPT

## 2019-01-01 PROCEDURE — 99223 1ST HOSP IP/OBS HIGH 75: CPT

## 2019-01-01 PROCEDURE — 85027 COMPLETE CBC AUTOMATED: CPT

## 2019-01-01 PROCEDURE — 71045 X-RAY EXAM CHEST 1 VIEW: CPT

## 2019-01-01 PROCEDURE — 87040 BLOOD CULTURE FOR BACTERIA: CPT

## 2019-01-01 PROCEDURE — 83735 ASSAY OF MAGNESIUM: CPT

## 2019-01-01 PROCEDURE — 86900 BLOOD TYPING SEROLOGIC ABO: CPT

## 2019-01-01 PROCEDURE — 51701 INSERT BLADDER CATHETER: CPT

## 2019-01-01 PROCEDURE — 99497 ADVNCD CARE PLAN 30 MIN: CPT | Mod: 25

## 2019-01-01 PROCEDURE — 80053 COMPREHEN METABOLIC PANEL: CPT

## 2019-01-01 PROCEDURE — 81001 URINALYSIS AUTO W/SCOPE: CPT

## 2019-01-01 PROCEDURE — 87086 URINE CULTURE/COLONY COUNT: CPT

## 2019-01-01 PROCEDURE — 99284 EMERGENCY DEPT VISIT MOD MDM: CPT

## 2019-01-01 PROCEDURE — 83605 ASSAY OF LACTIC ACID: CPT

## 2019-01-01 PROCEDURE — 73564 X-RAY EXAM KNEE 4 OR MORE: CPT

## 2019-01-01 PROCEDURE — 96374 THER/PROPH/DIAG INJ IV PUSH: CPT | Mod: XU

## 2019-01-01 PROCEDURE — 97161 PT EVAL LOW COMPLEX 20 MIN: CPT

## 2019-01-01 PROCEDURE — 92610 EVALUATE SWALLOWING FUNCTION: CPT

## 2019-01-01 PROCEDURE — 99284 EMERGENCY DEPT VISIT MOD MDM: CPT | Mod: 25

## 2019-01-01 PROCEDURE — 74230 X-RAY XM SWLNG FUNCJ C+: CPT

## 2019-01-01 PROCEDURE — 99283 EMERGENCY DEPT VISIT LOW MDM: CPT

## 2019-01-01 PROCEDURE — 97110 THERAPEUTIC EXERCISES: CPT

## 2019-01-01 PROCEDURE — 99285 EMERGENCY DEPT VISIT HI MDM: CPT | Mod: GC

## 2019-01-01 PROCEDURE — 82803 BLOOD GASES ANY COMBINATION: CPT

## 2019-01-01 PROCEDURE — 82435 ASSAY OF BLOOD CHLORIDE: CPT

## 2019-01-01 PROCEDURE — 97530 THERAPEUTIC ACTIVITIES: CPT

## 2019-01-01 PROCEDURE — 80048 BASIC METABOLIC PNL TOTAL CA: CPT

## 2019-01-01 PROCEDURE — 92611 MOTION FLUOROSCOPY/SWALLOW: CPT

## 2019-01-01 PROCEDURE — 96375 TX/PRO/DX INJ NEW DRUG ADDON: CPT

## 2019-01-01 PROCEDURE — 82330 ASSAY OF CALCIUM: CPT

## 2019-01-01 PROCEDURE — 82947 ASSAY GLUCOSE BLOOD QUANT: CPT

## 2019-01-01 PROCEDURE — 99284 EMERGENCY DEPT VISIT MOD MDM: CPT | Mod: GC

## 2019-01-01 PROCEDURE — 87633 RESP VIRUS 12-25 TARGETS: CPT

## 2019-01-01 PROCEDURE — 85014 HEMATOCRIT: CPT

## 2019-01-01 PROCEDURE — 84295 ASSAY OF SERUM SODIUM: CPT

## 2019-01-01 PROCEDURE — 96365 THER/PROPH/DIAG IV INF INIT: CPT

## 2019-01-01 PROCEDURE — 87581 M.PNEUMON DNA AMP PROBE: CPT

## 2019-01-01 PROCEDURE — 73564 X-RAY EXAM KNEE 4 OR MORE: CPT | Mod: 26,LT

## 2019-01-01 PROCEDURE — 84132 ASSAY OF SERUM POTASSIUM: CPT

## 2019-01-01 PROCEDURE — 80202 ASSAY OF VANCOMYCIN: CPT

## 2019-01-01 PROCEDURE — 87186 SC STD MICRODIL/AGAR DIL: CPT

## 2019-01-01 PROCEDURE — 99285 EMERGENCY DEPT VISIT HI MDM: CPT | Mod: 25

## 2019-01-01 PROCEDURE — 74230 X-RAY XM SWLNG FUNCJ C+: CPT | Mod: 26

## 2019-01-01 PROCEDURE — 87641 MR-STAPH DNA AMP PROBE: CPT

## 2019-01-01 PROCEDURE — 87798 DETECT AGENT NOS DNA AMP: CPT

## 2019-01-01 PROCEDURE — 86850 RBC ANTIBODY SCREEN: CPT

## 2019-01-01 RX ORDER — MUPIROCIN 20 MG/G
1 OINTMENT TOPICAL EVERY 12 HOURS
Qty: 0 | Refills: 0 | Status: DISCONTINUED | OUTPATIENT
Start: 2019-01-01 | End: 2019-01-01

## 2019-01-01 RX ORDER — SENNA PLUS 8.6 MG/1
2 TABLET ORAL
Qty: 0 | Refills: 0 | COMMUNITY
Start: 2019-01-01

## 2019-01-01 RX ORDER — CEFTRIAXONE 500 MG/1
1 INJECTION, POWDER, FOR SOLUTION INTRAMUSCULAR; INTRAVENOUS ONCE
Qty: 0 | Refills: 0 | Status: COMPLETED | OUTPATIENT
Start: 2019-01-01 | End: 2019-01-01

## 2019-01-01 RX ORDER — POTASSIUM CHLORIDE 20 MEQ
40 PACKET (EA) ORAL ONCE
Qty: 0 | Refills: 0 | Status: COMPLETED | OUTPATIENT
Start: 2019-01-01 | End: 2019-01-01

## 2019-01-01 RX ORDER — LANOLIN ALCOHOL/MO/W.PET/CERES
2 CREAM (GRAM) TOPICAL
Qty: 0 | Refills: 0 | COMMUNITY

## 2019-01-01 RX ORDER — METOPROLOL TARTRATE 50 MG
1 TABLET ORAL
Qty: 0 | Refills: 0 | COMMUNITY
Start: 2019-01-01

## 2019-01-01 RX ORDER — ACETAMINOPHEN 500 MG
2 TABLET ORAL
Qty: 0 | Refills: 0 | COMMUNITY

## 2019-01-01 RX ORDER — LATANOPROST 0.05 MG/ML
1 SOLUTION/ DROPS OPHTHALMIC; TOPICAL AT BEDTIME
Qty: 0 | Refills: 0 | Status: DISCONTINUED | OUTPATIENT
Start: 2019-01-01 | End: 2019-01-01

## 2019-01-01 RX ORDER — POLYETHYLENE GLYCOL 3350 17 G/17G
17 POWDER, FOR SOLUTION ORAL DAILY
Qty: 0 | Refills: 0 | Status: DISCONTINUED | OUTPATIENT
Start: 2019-01-01 | End: 2019-01-01

## 2019-01-01 RX ORDER — ESCITALOPRAM OXALATE 10 MG/1
15 TABLET, FILM COATED ORAL
Qty: 0 | Refills: 0 | COMMUNITY
Start: 2019-01-01

## 2019-01-01 RX ORDER — DIGOXIN 250 MCG
0.12 TABLET ORAL DAILY
Qty: 0 | Refills: 0 | Status: DISCONTINUED | OUTPATIENT
Start: 2019-01-01 | End: 2019-01-01

## 2019-01-01 RX ORDER — TIMOLOL 0.5 %
1 DROPS OPHTHALMIC (EYE) DAILY
Qty: 0 | Refills: 0 | Status: DISCONTINUED | OUTPATIENT
Start: 2019-01-01 | End: 2019-01-01

## 2019-01-01 RX ORDER — POTASSIUM CHLORIDE 20 MEQ
1 PACKET (EA) ORAL
Qty: 0 | Refills: 0 | COMMUNITY

## 2019-01-01 RX ORDER — DOCUSATE SODIUM 100 MG
1 CAPSULE ORAL
Qty: 42 | Refills: 0 | OUTPATIENT
Start: 2019-01-01 | End: 2019-01-01

## 2019-01-01 RX ORDER — HEPARIN SODIUM 5000 [USP'U]/ML
5000 INJECTION INTRAVENOUS; SUBCUTANEOUS EVERY 12 HOURS
Qty: 0 | Refills: 0 | Status: DISCONTINUED | OUTPATIENT
Start: 2019-01-01 | End: 2019-01-01

## 2019-01-01 RX ORDER — GUAIFENESIN/DEXTROMETHORPHAN 600MG-30MG
10 TABLET, EXTENDED RELEASE 12 HR ORAL
Qty: 0 | Refills: 0 | COMMUNITY
Start: 2019-01-01

## 2019-01-01 RX ORDER — POTASSIUM CHLORIDE 20 MEQ
20 PACKET (EA) ORAL
Qty: 0 | Refills: 0 | Status: COMPLETED | OUTPATIENT
Start: 2019-01-01 | End: 2019-01-01

## 2019-01-01 RX ORDER — ACETAMINOPHEN 500 MG
2 TABLET ORAL
Qty: 0 | Refills: 0 | COMMUNITY
Start: 2019-01-01

## 2019-01-01 RX ORDER — CLOPIDOGREL BISULFATE 75 MG/1
75 TABLET, FILM COATED ORAL DAILY
Qty: 0 | Refills: 0 | Status: DISCONTINUED | OUTPATIENT
Start: 2019-01-01 | End: 2019-01-01

## 2019-01-01 RX ORDER — HALOPERIDOL DECANOATE 100 MG/ML
0.5 INJECTION INTRAMUSCULAR EVERY 12 HOURS
Qty: 0 | Refills: 0 | Status: DISCONTINUED | OUTPATIENT
Start: 2019-01-01 | End: 2019-01-01

## 2019-01-01 RX ORDER — VANCOMYCIN HCL 1 G
1000 VIAL (EA) INTRAVENOUS ONCE
Qty: 0 | Refills: 0 | Status: COMPLETED | OUTPATIENT
Start: 2019-01-01 | End: 2019-01-01

## 2019-01-01 RX ORDER — LACTULOSE 10 G/15ML
10 SOLUTION ORAL ONCE
Qty: 0 | Refills: 0 | Status: COMPLETED | OUTPATIENT
Start: 2019-01-01 | End: 2019-01-01

## 2019-01-01 RX ORDER — DOCUSATE SODIUM 100 MG
1 CAPSULE ORAL
Qty: 0 | Refills: 0 | COMMUNITY
Start: 2019-01-01

## 2019-01-01 RX ORDER — CLOPIDOGREL BISULFATE 75 MG/1
1 TABLET, FILM COATED ORAL
Qty: 0 | Refills: 0 | COMMUNITY
Start: 2019-01-01

## 2019-01-01 RX ORDER — MUPIROCIN 20 MG/G
1 OINTMENT TOPICAL EVERY 12 HOURS
Qty: 0 | Refills: 0 | Status: COMPLETED | OUTPATIENT
Start: 2019-01-01 | End: 2019-01-01

## 2019-01-01 RX ORDER — ESCITALOPRAM OXALATE 10 MG/1
10 TABLET, FILM COATED ORAL DAILY
Qty: 0 | Refills: 0 | Status: DISCONTINUED | OUTPATIENT
Start: 2019-01-01 | End: 2019-01-01

## 2019-01-01 RX ORDER — PIPERACILLIN AND TAZOBACTAM 4; .5 G/20ML; G/20ML
3.38 INJECTION, POWDER, LYOPHILIZED, FOR SOLUTION INTRAVENOUS EVERY 8 HOURS
Qty: 0 | Refills: 0 | Status: DISCONTINUED | OUTPATIENT
Start: 2019-01-01 | End: 2019-01-01

## 2019-01-01 RX ORDER — CEPHALEXIN 500 MG
1 CAPSULE ORAL
Qty: 10 | Refills: 0 | OUTPATIENT
Start: 2019-01-01 | End: 2019-01-01

## 2019-01-01 RX ORDER — CLOPIDOGREL BISULFATE 75 MG/1
1 TABLET, FILM COATED ORAL
Qty: 0 | Refills: 0 | COMMUNITY

## 2019-01-01 RX ORDER — METOPROLOL TARTRATE 50 MG
25 TABLET ORAL ONCE
Qty: 0 | Refills: 0 | Status: COMPLETED | OUTPATIENT
Start: 2019-01-01 | End: 2019-01-01

## 2019-01-01 RX ORDER — GUAIFENESIN/DEXTROMETHORPHAN 600MG-30MG
10 TABLET, EXTENDED RELEASE 12 HR ORAL
Qty: 0 | Refills: 0 | COMMUNITY
Start: 2019-01-01 | End: 2019-01-01

## 2019-01-01 RX ORDER — ACETAMINOPHEN 500 MG
650 TABLET ORAL EVERY 6 HOURS
Qty: 0 | Refills: 0 | Status: DISCONTINUED | OUTPATIENT
Start: 2019-01-01 | End: 2019-01-01

## 2019-01-01 RX ORDER — PIPERACILLIN AND TAZOBACTAM 4; .5 G/20ML; G/20ML
3.38 INJECTION, POWDER, LYOPHILIZED, FOR SOLUTION INTRAVENOUS ONCE
Qty: 0 | Refills: 0 | Status: COMPLETED | OUTPATIENT
Start: 2019-01-01 | End: 2019-01-01

## 2019-01-01 RX ORDER — POTASSIUM CHLORIDE 20 MEQ
10 PACKET (EA) ORAL
Qty: 0 | Refills: 0 | Status: COMPLETED | OUTPATIENT
Start: 2019-01-01 | End: 2019-01-01

## 2019-01-01 RX ORDER — PANTOPRAZOLE SODIUM 20 MG/1
40 TABLET, DELAYED RELEASE ORAL
Qty: 0 | Refills: 0 | Status: DISCONTINUED | OUTPATIENT
Start: 2019-01-01 | End: 2019-01-01

## 2019-01-01 RX ORDER — SENNA PLUS 8.6 MG/1
2 TABLET ORAL AT BEDTIME
Qty: 0 | Refills: 0 | Status: DISCONTINUED | OUTPATIENT
Start: 2019-01-01 | End: 2019-01-01

## 2019-01-01 RX ORDER — DOCUSATE SODIUM 100 MG
100 CAPSULE ORAL THREE TIMES A DAY
Qty: 0 | Refills: 0 | Status: DISCONTINUED | OUTPATIENT
Start: 2019-01-01 | End: 2019-01-01

## 2019-01-01 RX ORDER — ACETAMINOPHEN 500 MG
650 TABLET ORAL ONCE
Qty: 0 | Refills: 0 | Status: COMPLETED | OUTPATIENT
Start: 2019-01-01 | End: 2019-01-01

## 2019-01-01 RX ORDER — HALOPERIDOL DECANOATE 100 MG/ML
0.5 INJECTION INTRAMUSCULAR ONCE
Qty: 0 | Refills: 0 | Status: DISCONTINUED | OUTPATIENT
Start: 2019-01-01 | End: 2019-01-01

## 2019-01-01 RX ORDER — SENNA PLUS 8.6 MG/1
2 TABLET ORAL
Qty: 28 | Refills: 0 | OUTPATIENT
Start: 2019-01-01 | End: 2019-01-01

## 2019-01-01 RX ORDER — DIGOXIN 250 MCG
1 TABLET ORAL
Qty: 0 | Refills: 0 | COMMUNITY
Start: 2019-01-01

## 2019-01-01 RX ORDER — OMEPRAZOLE 10 MG/1
1 CAPSULE, DELAYED RELEASE ORAL
Qty: 0 | Refills: 0 | COMMUNITY

## 2019-01-01 RX ORDER — AMLODIPINE BESYLATE 2.5 MG/1
1 TABLET ORAL
Qty: 0 | Refills: 0 | COMMUNITY
Start: 2019-01-01

## 2019-01-01 RX ORDER — POLYETHYLENE GLYCOL 3350 17 G/17G
17 POWDER, FOR SOLUTION ORAL
Qty: 14 | Refills: 0 | OUTPATIENT
Start: 2019-01-01 | End: 2019-01-01

## 2019-01-01 RX ORDER — FLUTICASONE PROPIONATE 50 MCG
1 SPRAY, SUSPENSION NASAL
Qty: 0 | Refills: 0 | COMMUNITY

## 2019-01-01 RX ORDER — SODIUM CHLORIDE 9 MG/ML
500 INJECTION INTRAMUSCULAR; INTRAVENOUS; SUBCUTANEOUS ONCE
Qty: 0 | Refills: 0 | Status: COMPLETED | OUTPATIENT
Start: 2019-01-01 | End: 2019-01-01

## 2019-01-01 RX ORDER — AMLODIPINE BESYLATE 2.5 MG/1
5 TABLET ORAL DAILY
Qty: 0 | Refills: 0 | Status: DISCONTINUED | OUTPATIENT
Start: 2019-01-01 | End: 2019-01-01

## 2019-01-01 RX ORDER — SODIUM FLUORIDE 1.1 G/100G
5 GEL ORAL
Qty: 0 | Refills: 0 | COMMUNITY

## 2019-01-01 RX ORDER — POLYETHYLENE GLYCOL 3350 17 G/17G
17 POWDER, FOR SOLUTION ORAL
Qty: 0 | Refills: 0 | COMMUNITY
Start: 2019-01-01

## 2019-01-01 RX ORDER — METOPROLOL TARTRATE 50 MG
25 TABLET ORAL EVERY 12 HOURS
Qty: 0 | Refills: 0 | Status: DISCONTINUED | OUTPATIENT
Start: 2019-01-01 | End: 2019-01-01

## 2019-01-01 RX ORDER — VANCOMYCIN HCL 1 G
1000 VIAL (EA) INTRAVENOUS EVERY 12 HOURS
Qty: 0 | Refills: 0 | Status: DISCONTINUED | OUTPATIENT
Start: 2019-01-01 | End: 2019-01-01

## 2019-01-01 RX ORDER — ESCITALOPRAM OXALATE 10 MG/1
1 TABLET, FILM COATED ORAL
Qty: 0 | Refills: 0 | COMMUNITY
Start: 2019-01-01

## 2019-01-01 RX ADMIN — Medication 100 MILLIGRAM(S): at 21:25

## 2019-01-01 RX ADMIN — Medication 25 MILLIGRAM(S): at 06:08

## 2019-01-01 RX ADMIN — Medication 0.12 MILLIGRAM(S): at 06:41

## 2019-01-01 RX ADMIN — Medication 100 MILLIGRAM(S): at 06:41

## 2019-01-01 RX ADMIN — HEPARIN SODIUM 5000 UNIT(S): 5000 INJECTION INTRAVENOUS; SUBCUTANEOUS at 18:42

## 2019-01-01 RX ADMIN — CLOPIDOGREL BISULFATE 75 MILLIGRAM(S): 75 TABLET, FILM COATED ORAL at 11:48

## 2019-01-01 RX ADMIN — HEPARIN SODIUM 5000 UNIT(S): 5000 INJECTION INTRAVENOUS; SUBCUTANEOUS at 06:25

## 2019-01-01 RX ADMIN — Medication 250 MILLIGRAM(S): at 18:43

## 2019-01-01 RX ADMIN — Medication 250 MILLIGRAM(S): at 05:07

## 2019-01-01 RX ADMIN — AMLODIPINE BESYLATE 5 MILLIGRAM(S): 2.5 TABLET ORAL at 05:15

## 2019-01-01 RX ADMIN — Medication 1 DROP(S): at 12:53

## 2019-01-01 RX ADMIN — PIPERACILLIN AND TAZOBACTAM 25 GRAM(S): 4; .5 INJECTION, POWDER, LYOPHILIZED, FOR SOLUTION INTRAVENOUS at 22:59

## 2019-01-01 RX ADMIN — Medication 100 MILLIGRAM(S): at 13:13

## 2019-01-01 RX ADMIN — MUPIROCIN 1 APPLICATION(S): 20 OINTMENT TOPICAL at 05:50

## 2019-01-01 RX ADMIN — Medication 25 MILLIGRAM(S): at 17:36

## 2019-01-01 RX ADMIN — ESCITALOPRAM OXALATE 10 MILLIGRAM(S): 10 TABLET, FILM COATED ORAL at 11:24

## 2019-01-01 RX ADMIN — ESCITALOPRAM OXALATE 10 MILLIGRAM(S): 10 TABLET, FILM COATED ORAL at 13:28

## 2019-01-01 RX ADMIN — SENNA PLUS 2 TABLET(S): 8.6 TABLET ORAL at 22:14

## 2019-01-01 RX ADMIN — ESCITALOPRAM OXALATE 10 MILLIGRAM(S): 10 TABLET, FILM COATED ORAL at 11:36

## 2019-01-01 RX ADMIN — PIPERACILLIN AND TAZOBACTAM 3.38 GRAM(S): 4; .5 INJECTION, POWDER, LYOPHILIZED, FOR SOLUTION INTRAVENOUS at 07:19

## 2019-01-01 RX ADMIN — HEPARIN SODIUM 5000 UNIT(S): 5000 INJECTION INTRAVENOUS; SUBCUTANEOUS at 17:04

## 2019-01-01 RX ADMIN — Medication 1 DROP(S): at 12:44

## 2019-01-01 RX ADMIN — Medication 250 MILLIGRAM(S): at 05:00

## 2019-01-01 RX ADMIN — Medication 20 MILLIEQUIVALENT(S): at 17:17

## 2019-01-01 RX ADMIN — Medication 0.12 MILLIGRAM(S): at 05:07

## 2019-01-01 RX ADMIN — Medication 100 MILLIGRAM(S): at 13:34

## 2019-01-01 RX ADMIN — Medication 250 MILLIGRAM(S): at 07:23

## 2019-01-01 RX ADMIN — PIPERACILLIN AND TAZOBACTAM 25 GRAM(S): 4; .5 INJECTION, POWDER, LYOPHILIZED, FOR SOLUTION INTRAVENOUS at 21:40

## 2019-01-01 RX ADMIN — LATANOPROST 1 DROP(S): 0.05 SOLUTION/ DROPS OPHTHALMIC; TOPICAL at 21:25

## 2019-01-01 RX ADMIN — CLOPIDOGREL BISULFATE 75 MILLIGRAM(S): 75 TABLET, FILM COATED ORAL at 13:28

## 2019-01-01 RX ADMIN — PANTOPRAZOLE SODIUM 40 MILLIGRAM(S): 20 TABLET, DELAYED RELEASE ORAL at 06:41

## 2019-01-01 RX ADMIN — AMLODIPINE BESYLATE 5 MILLIGRAM(S): 2.5 TABLET ORAL at 05:50

## 2019-01-01 RX ADMIN — Medication 20 MILLIEQUIVALENT(S): at 13:21

## 2019-01-01 RX ADMIN — AMLODIPINE BESYLATE 5 MILLIGRAM(S): 2.5 TABLET ORAL at 05:00

## 2019-01-01 RX ADMIN — Medication 2.5 MILLIGRAM(S): at 05:50

## 2019-01-01 RX ADMIN — Medication 100 MILLIGRAM(S): at 22:59

## 2019-01-01 RX ADMIN — ESCITALOPRAM OXALATE 10 MILLIGRAM(S): 10 TABLET, FILM COATED ORAL at 11:10

## 2019-01-01 RX ADMIN — LATANOPROST 1 DROP(S): 0.05 SOLUTION/ DROPS OPHTHALMIC; TOPICAL at 22:31

## 2019-01-01 RX ADMIN — Medication 0.12 MILLIGRAM(S): at 05:02

## 2019-01-01 RX ADMIN — SENNA PLUS 2 TABLET(S): 8.6 TABLET ORAL at 21:52

## 2019-01-01 RX ADMIN — Medication 0.12 MILLIGRAM(S): at 05:13

## 2019-01-01 RX ADMIN — Medication 1 DROP(S): at 13:18

## 2019-01-01 RX ADMIN — MUPIROCIN 1 APPLICATION(S): 20 OINTMENT TOPICAL at 05:00

## 2019-01-01 RX ADMIN — Medication 25 MILLIGRAM(S): at 05:15

## 2019-01-01 RX ADMIN — Medication 2.5 MILLIGRAM(S): at 05:09

## 2019-01-01 RX ADMIN — Medication 2.5 MILLIGRAM(S): at 05:18

## 2019-01-01 RX ADMIN — HEPARIN SODIUM 5000 UNIT(S): 5000 INJECTION INTRAVENOUS; SUBCUTANEOUS at 17:23

## 2019-01-01 RX ADMIN — CLOPIDOGREL BISULFATE 75 MILLIGRAM(S): 75 TABLET, FILM COATED ORAL at 11:34

## 2019-01-01 RX ADMIN — Medication 100 MILLIGRAM(S): at 05:50

## 2019-01-01 RX ADMIN — ESCITALOPRAM OXALATE 10 MILLIGRAM(S): 10 TABLET, FILM COATED ORAL at 13:19

## 2019-01-01 RX ADMIN — Medication 25 MILLIGRAM(S): at 17:17

## 2019-01-01 RX ADMIN — PANTOPRAZOLE SODIUM 40 MILLIGRAM(S): 20 TABLET, DELAYED RELEASE ORAL at 05:19

## 2019-01-01 RX ADMIN — LATANOPROST 1 DROP(S): 0.05 SOLUTION/ DROPS OPHTHALMIC; TOPICAL at 22:57

## 2019-01-01 RX ADMIN — PIPERACILLIN AND TAZOBACTAM 25 GRAM(S): 4; .5 INJECTION, POWDER, LYOPHILIZED, FOR SOLUTION INTRAVENOUS at 05:06

## 2019-01-01 RX ADMIN — MUPIROCIN 1 APPLICATION(S): 20 OINTMENT TOPICAL at 18:18

## 2019-01-01 RX ADMIN — Medication 100 MILLIGRAM(S): at 13:12

## 2019-01-01 RX ADMIN — Medication 100 MILLIGRAM(S): at 05:13

## 2019-01-01 RX ADMIN — PIPERACILLIN AND TAZOBACTAM 25 GRAM(S): 4; .5 INJECTION, POWDER, LYOPHILIZED, FOR SOLUTION INTRAVENOUS at 21:52

## 2019-01-01 RX ADMIN — Medication 25 MILLIGRAM(S): at 05:02

## 2019-01-01 RX ADMIN — HEPARIN SODIUM 5000 UNIT(S): 5000 INJECTION INTRAVENOUS; SUBCUTANEOUS at 17:17

## 2019-01-01 RX ADMIN — Medication 100 MILLIEQUIVALENT(S): at 13:21

## 2019-01-01 RX ADMIN — CLOPIDOGREL BISULFATE 75 MILLIGRAM(S): 75 TABLET, FILM COATED ORAL at 12:44

## 2019-01-01 RX ADMIN — Medication 1 DROP(S): at 11:11

## 2019-01-01 RX ADMIN — PIPERACILLIN AND TAZOBACTAM 25 GRAM(S): 4; .5 INJECTION, POWDER, LYOPHILIZED, FOR SOLUTION INTRAVENOUS at 13:57

## 2019-01-01 RX ADMIN — Medication 0.12 MILLIGRAM(S): at 05:50

## 2019-01-01 RX ADMIN — Medication 1 DROP(S): at 11:34

## 2019-01-01 RX ADMIN — POLYETHYLENE GLYCOL 3350 17 GRAM(S): 17 POWDER, FOR SOLUTION ORAL at 13:12

## 2019-01-01 RX ADMIN — PANTOPRAZOLE SODIUM 40 MILLIGRAM(S): 20 TABLET, DELAYED RELEASE ORAL at 06:25

## 2019-01-01 RX ADMIN — MUPIROCIN 1 APPLICATION(S): 20 OINTMENT TOPICAL at 18:02

## 2019-01-01 RX ADMIN — CLOPIDOGREL BISULFATE 75 MILLIGRAM(S): 75 TABLET, FILM COATED ORAL at 13:13

## 2019-01-01 RX ADMIN — POLYETHYLENE GLYCOL 3350 17 GRAM(S): 17 POWDER, FOR SOLUTION ORAL at 11:48

## 2019-01-01 RX ADMIN — PANTOPRAZOLE SODIUM 40 MILLIGRAM(S): 20 TABLET, DELAYED RELEASE ORAL at 06:07

## 2019-01-01 RX ADMIN — PANTOPRAZOLE SODIUM 40 MILLIGRAM(S): 20 TABLET, DELAYED RELEASE ORAL at 05:55

## 2019-01-01 RX ADMIN — Medication 100 MILLIGRAM(S): at 21:33

## 2019-01-01 RX ADMIN — Medication 100 MILLIGRAM(S): at 13:18

## 2019-01-01 RX ADMIN — CLOPIDOGREL BISULFATE 75 MILLIGRAM(S): 75 TABLET, FILM COATED ORAL at 11:17

## 2019-01-01 RX ADMIN — Medication 250 MILLIGRAM(S): at 12:44

## 2019-01-01 RX ADMIN — Medication 40 MILLIEQUIVALENT(S): at 06:28

## 2019-01-01 RX ADMIN — Medication 25 MILLIGRAM(S): at 05:09

## 2019-01-01 RX ADMIN — SENNA PLUS 2 TABLET(S): 8.6 TABLET ORAL at 21:25

## 2019-01-01 RX ADMIN — HEPARIN SODIUM 5000 UNIT(S): 5000 INJECTION INTRAVENOUS; SUBCUTANEOUS at 05:07

## 2019-01-01 RX ADMIN — Medication 0.12 MILLIGRAM(S): at 05:15

## 2019-01-01 RX ADMIN — Medication 250 MILLIGRAM(S): at 09:19

## 2019-01-01 RX ADMIN — Medication 25 MILLIGRAM(S): at 18:03

## 2019-01-01 RX ADMIN — CEFTRIAXONE 100 GRAM(S): 500 INJECTION, POWDER, FOR SOLUTION INTRAMUSCULAR; INTRAVENOUS at 21:36

## 2019-01-01 RX ADMIN — HEPARIN SODIUM 5000 UNIT(S): 5000 INJECTION INTRAVENOUS; SUBCUTANEOUS at 18:03

## 2019-01-01 RX ADMIN — HEPARIN SODIUM 5000 UNIT(S): 5000 INJECTION INTRAVENOUS; SUBCUTANEOUS at 17:44

## 2019-01-01 RX ADMIN — CLOPIDOGREL BISULFATE 75 MILLIGRAM(S): 75 TABLET, FILM COATED ORAL at 11:36

## 2019-01-01 RX ADMIN — PIPERACILLIN AND TAZOBACTAM 25 GRAM(S): 4; .5 INJECTION, POWDER, LYOPHILIZED, FOR SOLUTION INTRAVENOUS at 11:17

## 2019-01-01 RX ADMIN — ESCITALOPRAM OXALATE 10 MILLIGRAM(S): 10 TABLET, FILM COATED ORAL at 13:57

## 2019-01-01 RX ADMIN — SENNA PLUS 2 TABLET(S): 8.6 TABLET ORAL at 22:30

## 2019-01-01 RX ADMIN — CLOPIDOGREL BISULFATE 75 MILLIGRAM(S): 75 TABLET, FILM COATED ORAL at 11:24

## 2019-01-01 RX ADMIN — ESCITALOPRAM OXALATE 10 MILLIGRAM(S): 10 TABLET, FILM COATED ORAL at 13:12

## 2019-01-01 RX ADMIN — MUPIROCIN 1 APPLICATION(S): 20 OINTMENT TOPICAL at 17:02

## 2019-01-01 RX ADMIN — Medication 250 MILLIGRAM(S): at 22:21

## 2019-01-01 RX ADMIN — Medication 250 MILLIGRAM(S): at 06:07

## 2019-01-01 RX ADMIN — ESCITALOPRAM OXALATE 10 MILLIGRAM(S): 10 TABLET, FILM COATED ORAL at 12:18

## 2019-01-01 RX ADMIN — HEPARIN SODIUM 5000 UNIT(S): 5000 INJECTION INTRAVENOUS; SUBCUTANEOUS at 05:14

## 2019-01-01 RX ADMIN — PIPERACILLIN AND TAZOBACTAM 25 GRAM(S): 4; .5 INJECTION, POWDER, LYOPHILIZED, FOR SOLUTION INTRAVENOUS at 14:55

## 2019-01-01 RX ADMIN — Medication 1 DROP(S): at 13:14

## 2019-01-01 RX ADMIN — HEPARIN SODIUM 5000 UNIT(S): 5000 INJECTION INTRAVENOUS; SUBCUTANEOUS at 06:42

## 2019-01-01 RX ADMIN — Medication 1 DROP(S): at 13:12

## 2019-01-01 RX ADMIN — Medication 100 MILLIGRAM(S): at 12:44

## 2019-01-01 RX ADMIN — Medication 25 MILLIGRAM(S): at 18:02

## 2019-01-01 RX ADMIN — Medication 2.5 MILLIGRAM(S): at 05:07

## 2019-01-01 RX ADMIN — PIPERACILLIN AND TAZOBACTAM 200 GRAM(S): 4; .5 INJECTION, POWDER, LYOPHILIZED, FOR SOLUTION INTRAVENOUS at 06:53

## 2019-01-01 RX ADMIN — Medication 25 MILLIGRAM(S): at 05:13

## 2019-01-01 RX ADMIN — HEPARIN SODIUM 5000 UNIT(S): 5000 INJECTION INTRAVENOUS; SUBCUTANEOUS at 05:10

## 2019-01-01 RX ADMIN — CLOPIDOGREL BISULFATE 75 MILLIGRAM(S): 75 TABLET, FILM COATED ORAL at 12:52

## 2019-01-01 RX ADMIN — PANTOPRAZOLE SODIUM 40 MILLIGRAM(S): 20 TABLET, DELAYED RELEASE ORAL at 05:15

## 2019-01-01 RX ADMIN — Medication 25 MILLIGRAM(S): at 05:51

## 2019-01-01 RX ADMIN — Medication 25 MILLIGRAM(S): at 17:26

## 2019-01-01 RX ADMIN — Medication 100 MILLIGRAM(S): at 05:09

## 2019-01-01 RX ADMIN — ESCITALOPRAM OXALATE 10 MILLIGRAM(S): 10 TABLET, FILM COATED ORAL at 12:52

## 2019-01-01 RX ADMIN — AMLODIPINE BESYLATE 5 MILLIGRAM(S): 2.5 TABLET ORAL at 05:02

## 2019-01-01 RX ADMIN — ESCITALOPRAM OXALATE 10 MILLIGRAM(S): 10 TABLET, FILM COATED ORAL at 11:47

## 2019-01-01 RX ADMIN — PIPERACILLIN AND TAZOBACTAM 25 GRAM(S): 4; .5 INJECTION, POWDER, LYOPHILIZED, FOR SOLUTION INTRAVENOUS at 05:51

## 2019-01-01 RX ADMIN — Medication 650 MILLIGRAM(S): at 17:36

## 2019-01-01 RX ADMIN — Medication 100 MILLIGRAM(S): at 05:04

## 2019-01-01 RX ADMIN — CLOPIDOGREL BISULFATE 75 MILLIGRAM(S): 75 TABLET, FILM COATED ORAL at 13:18

## 2019-01-01 RX ADMIN — Medication 650 MILLIGRAM(S): at 08:33

## 2019-01-01 RX ADMIN — LACTULOSE 10 GRAM(S): 10 SOLUTION ORAL at 09:18

## 2019-01-01 RX ADMIN — Medication 250 MILLIGRAM(S): at 05:18

## 2019-01-01 RX ADMIN — POLYETHYLENE GLYCOL 3350 17 GRAM(S): 17 POWDER, FOR SOLUTION ORAL at 11:36

## 2019-01-01 RX ADMIN — SENNA PLUS 2 TABLET(S): 8.6 TABLET ORAL at 21:48

## 2019-01-01 RX ADMIN — SODIUM CHLORIDE 500 MILLILITER(S): 9 INJECTION INTRAMUSCULAR; INTRAVENOUS; SUBCUTANEOUS at 06:50

## 2019-01-01 RX ADMIN — Medication 250 MILLIGRAM(S): at 02:44

## 2019-01-01 RX ADMIN — MUPIROCIN 1 APPLICATION(S): 20 OINTMENT TOPICAL at 06:41

## 2019-01-01 RX ADMIN — Medication 25 MILLIGRAM(S): at 17:02

## 2019-01-01 RX ADMIN — Medication 2.5 MILLIGRAM(S): at 05:15

## 2019-01-01 RX ADMIN — AMLODIPINE BESYLATE 5 MILLIGRAM(S): 2.5 TABLET ORAL at 05:14

## 2019-01-01 RX ADMIN — HEPARIN SODIUM 5000 UNIT(S): 5000 INJECTION INTRAVENOUS; SUBCUTANEOUS at 17:36

## 2019-01-01 RX ADMIN — PIPERACILLIN AND TAZOBACTAM 25 GRAM(S): 4; .5 INJECTION, POWDER, LYOPHILIZED, FOR SOLUTION INTRAVENOUS at 00:01

## 2019-01-01 RX ADMIN — Medication 0.12 MILLIGRAM(S): at 06:26

## 2019-01-01 RX ADMIN — HEPARIN SODIUM 5000 UNIT(S): 5000 INJECTION INTRAVENOUS; SUBCUTANEOUS at 05:50

## 2019-01-01 RX ADMIN — Medication 1 DROP(S): at 11:23

## 2019-01-01 RX ADMIN — Medication 2.5 MILLIGRAM(S): at 06:41

## 2019-01-01 RX ADMIN — PIPERACILLIN AND TAZOBACTAM 25 GRAM(S): 4; .5 INJECTION, POWDER, LYOPHILIZED, FOR SOLUTION INTRAVENOUS at 06:33

## 2019-01-01 RX ADMIN — Medication 25 MILLIGRAM(S): at 17:23

## 2019-01-01 RX ADMIN — SENNA PLUS 2 TABLET(S): 8.6 TABLET ORAL at 22:57

## 2019-01-01 RX ADMIN — Medication 100 MILLIGRAM(S): at 13:57

## 2019-01-01 RX ADMIN — Medication 100 MILLIGRAM(S): at 21:52

## 2019-01-01 RX ADMIN — HEPARIN SODIUM 5000 UNIT(S): 5000 INJECTION INTRAVENOUS; SUBCUTANEOUS at 18:18

## 2019-01-01 RX ADMIN — Medication 2.5 MILLIGRAM(S): at 05:04

## 2019-01-01 RX ADMIN — Medication 2.5 MILLIGRAM(S): at 06:08

## 2019-01-01 RX ADMIN — AMLODIPINE BESYLATE 5 MILLIGRAM(S): 2.5 TABLET ORAL at 05:08

## 2019-01-01 RX ADMIN — Medication 1 DROP(S): at 11:48

## 2019-01-01 RX ADMIN — POLYETHYLENE GLYCOL 3350 17 GRAM(S): 17 POWDER, FOR SOLUTION ORAL at 13:18

## 2019-01-01 RX ADMIN — Medication 100 MILLIGRAM(S): at 22:14

## 2019-01-01 RX ADMIN — Medication 100 MILLIGRAM(S): at 23:26

## 2019-01-01 RX ADMIN — PIPERACILLIN AND TAZOBACTAM 25 GRAM(S): 4; .5 INJECTION, POWDER, LYOPHILIZED, FOR SOLUTION INTRAVENOUS at 05:02

## 2019-01-01 RX ADMIN — HEPARIN SODIUM 5000 UNIT(S): 5000 INJECTION INTRAVENOUS; SUBCUTANEOUS at 05:09

## 2019-01-01 RX ADMIN — PANTOPRAZOLE SODIUM 40 MILLIGRAM(S): 20 TABLET, DELAYED RELEASE ORAL at 05:13

## 2019-01-01 RX ADMIN — Medication 25 MILLIGRAM(S): at 16:24

## 2019-01-01 RX ADMIN — AMLODIPINE BESYLATE 5 MILLIGRAM(S): 2.5 TABLET ORAL at 05:09

## 2019-01-01 RX ADMIN — PIPERACILLIN AND TAZOBACTAM 25 GRAM(S): 4; .5 INJECTION, POWDER, LYOPHILIZED, FOR SOLUTION INTRAVENOUS at 23:22

## 2019-01-01 RX ADMIN — LATANOPROST 1 DROP(S): 0.05 SOLUTION/ DROPS OPHTHALMIC; TOPICAL at 22:27

## 2019-01-01 RX ADMIN — LATANOPROST 1 DROP(S): 0.05 SOLUTION/ DROPS OPHTHALMIC; TOPICAL at 21:53

## 2019-01-01 RX ADMIN — Medication 25 MILLIGRAM(S): at 06:41

## 2019-01-01 RX ADMIN — Medication 100 MILLIGRAM(S): at 05:14

## 2019-01-01 RX ADMIN — Medication 100 MILLIGRAM(S): at 06:25

## 2019-01-01 RX ADMIN — SENNA PLUS 2 TABLET(S): 8.6 TABLET ORAL at 21:33

## 2019-01-01 RX ADMIN — HEPARIN SODIUM 5000 UNIT(S): 5000 INJECTION INTRAVENOUS; SUBCUTANEOUS at 05:18

## 2019-01-01 RX ADMIN — LATANOPROST 1 DROP(S): 0.05 SOLUTION/ DROPS OPHTHALMIC; TOPICAL at 21:33

## 2019-01-01 RX ADMIN — HEPARIN SODIUM 5000 UNIT(S): 5000 INJECTION INTRAVENOUS; SUBCUTANEOUS at 17:02

## 2019-01-01 RX ADMIN — HEPARIN SODIUM 5000 UNIT(S): 5000 INJECTION INTRAVENOUS; SUBCUTANEOUS at 05:00

## 2019-01-01 RX ADMIN — HEPARIN SODIUM 5000 UNIT(S): 5000 INJECTION INTRAVENOUS; SUBCUTANEOUS at 18:02

## 2019-01-01 RX ADMIN — SENNA PLUS 2 TABLET(S): 8.6 TABLET ORAL at 21:27

## 2019-01-01 RX ADMIN — Medication 100 MILLIGRAM(S): at 13:05

## 2019-01-01 RX ADMIN — AMLODIPINE BESYLATE 5 MILLIGRAM(S): 2.5 TABLET ORAL at 05:18

## 2019-01-01 RX ADMIN — HEPARIN SODIUM 5000 UNIT(S): 5000 INJECTION INTRAVENOUS; SUBCUTANEOUS at 18:31

## 2019-01-01 RX ADMIN — PANTOPRAZOLE SODIUM 40 MILLIGRAM(S): 20 TABLET, DELAYED RELEASE ORAL at 05:09

## 2019-01-01 RX ADMIN — Medication 25 MILLIGRAM(S): at 17:04

## 2019-01-01 RX ADMIN — LATANOPROST 1 DROP(S): 0.05 SOLUTION/ DROPS OPHTHALMIC; TOPICAL at 21:12

## 2019-01-01 RX ADMIN — AMLODIPINE BESYLATE 5 MILLIGRAM(S): 2.5 TABLET ORAL at 06:41

## 2019-01-01 RX ADMIN — Medication 0.12 MILLIGRAM(S): at 06:08

## 2019-01-01 RX ADMIN — Medication 100 MILLIGRAM(S): at 05:07

## 2019-01-01 RX ADMIN — LATANOPROST 1 DROP(S): 0.05 SOLUTION/ DROPS OPHTHALMIC; TOPICAL at 21:27

## 2019-01-01 RX ADMIN — Medication 2.5 MILLIGRAM(S): at 05:00

## 2019-01-01 RX ADMIN — MUPIROCIN 1 APPLICATION(S): 20 OINTMENT TOPICAL at 05:06

## 2019-01-01 RX ADMIN — Medication 1 DROP(S): at 13:29

## 2019-01-01 RX ADMIN — Medication 0.12 MILLIGRAM(S): at 05:18

## 2019-01-01 RX ADMIN — PANTOPRAZOLE SODIUM 40 MILLIGRAM(S): 20 TABLET, DELAYED RELEASE ORAL at 05:04

## 2019-01-01 RX ADMIN — Medication 25 MILLIGRAM(S): at 18:43

## 2019-01-01 RX ADMIN — Medication 25 MILLIGRAM(S): at 17:44

## 2019-01-01 RX ADMIN — PIPERACILLIN AND TAZOBACTAM 25 GRAM(S): 4; .5 INJECTION, POWDER, LYOPHILIZED, FOR SOLUTION INTRAVENOUS at 22:27

## 2019-01-01 RX ADMIN — POLYETHYLENE GLYCOL 3350 17 GRAM(S): 17 POWDER, FOR SOLUTION ORAL at 12:18

## 2019-01-01 RX ADMIN — SENNA PLUS 2 TABLET(S): 8.6 TABLET ORAL at 21:12

## 2019-01-01 RX ADMIN — PIPERACILLIN AND TAZOBACTAM 25 GRAM(S): 4; .5 INJECTION, POWDER, LYOPHILIZED, FOR SOLUTION INTRAVENOUS at 23:30

## 2019-01-01 RX ADMIN — Medication 100 MILLIEQUIVALENT(S): at 17:18

## 2019-01-01 RX ADMIN — PIPERACILLIN AND TAZOBACTAM 25 GRAM(S): 4; .5 INJECTION, POWDER, LYOPHILIZED, FOR SOLUTION INTRAVENOUS at 13:29

## 2019-01-01 RX ADMIN — Medication 25 MILLIGRAM(S): at 18:31

## 2019-01-01 RX ADMIN — MUPIROCIN 1 APPLICATION(S): 20 OINTMENT TOPICAL at 17:44

## 2019-01-01 RX ADMIN — PANTOPRAZOLE SODIUM 40 MILLIGRAM(S): 20 TABLET, DELAYED RELEASE ORAL at 05:02

## 2019-01-01 RX ADMIN — MUPIROCIN 1 APPLICATION(S): 20 OINTMENT TOPICAL at 05:05

## 2019-01-01 RX ADMIN — Medication 2.5 MILLIGRAM(S): at 05:14

## 2019-01-01 RX ADMIN — Medication 250 MILLIGRAM(S): at 20:00

## 2019-01-01 RX ADMIN — Medication 250 MILLIGRAM(S): at 17:19

## 2019-01-01 RX ADMIN — ESCITALOPRAM OXALATE 10 MILLIGRAM(S): 10 TABLET, FILM COATED ORAL at 12:44

## 2019-01-01 RX ADMIN — AMLODIPINE BESYLATE 5 MILLIGRAM(S): 2.5 TABLET ORAL at 06:25

## 2019-01-01 RX ADMIN — Medication 250 MILLIGRAM(S): at 09:29

## 2019-01-01 RX ADMIN — Medication 100 MILLIGRAM(S): at 21:17

## 2019-01-01 RX ADMIN — PIPERACILLIN AND TAZOBACTAM 25 GRAM(S): 4; .5 INJECTION, POWDER, LYOPHILIZED, FOR SOLUTION INTRAVENOUS at 14:07

## 2019-01-01 RX ADMIN — PIPERACILLIN AND TAZOBACTAM 25 GRAM(S): 4; .5 INJECTION, POWDER, LYOPHILIZED, FOR SOLUTION INTRAVENOUS at 07:49

## 2019-01-01 RX ADMIN — Medication 250 MILLIGRAM(S): at 17:44

## 2019-01-01 RX ADMIN — CLOPIDOGREL BISULFATE 75 MILLIGRAM(S): 75 TABLET, FILM COATED ORAL at 13:12

## 2019-01-01 RX ADMIN — AMLODIPINE BESYLATE 5 MILLIGRAM(S): 2.5 TABLET ORAL at 06:07

## 2019-01-01 RX ADMIN — POLYETHYLENE GLYCOL 3350 17 GRAM(S): 17 POWDER, FOR SOLUTION ORAL at 13:14

## 2019-01-01 RX ADMIN — LATANOPROST 1 DROP(S): 0.05 SOLUTION/ DROPS OPHTHALMIC; TOPICAL at 23:26

## 2019-01-01 RX ADMIN — PANTOPRAZOLE SODIUM 40 MILLIGRAM(S): 20 TABLET, DELAYED RELEASE ORAL at 05:07

## 2019-01-01 RX ADMIN — CLOPIDOGREL BISULFATE 75 MILLIGRAM(S): 75 TABLET, FILM COATED ORAL at 12:18

## 2019-01-01 RX ADMIN — Medication 1 DROP(S): at 13:57

## 2019-01-01 RX ADMIN — HEPARIN SODIUM 5000 UNIT(S): 5000 INJECTION INTRAVENOUS; SUBCUTANEOUS at 06:08

## 2019-01-01 RX ADMIN — LATANOPROST 1 DROP(S): 0.05 SOLUTION/ DROPS OPHTHALMIC; TOPICAL at 21:34

## 2019-01-01 RX ADMIN — Medication 25 MILLIGRAM(S): at 05:18

## 2019-01-01 RX ADMIN — Medication 0.12 MILLIGRAM(S): at 05:09

## 2019-01-01 RX ADMIN — AMLODIPINE BESYLATE 5 MILLIGRAM(S): 2.5 TABLET ORAL at 05:13

## 2019-01-01 RX ADMIN — Medication 100 MILLIGRAM(S): at 13:28

## 2019-01-01 RX ADMIN — PIPERACILLIN AND TAZOBACTAM 25 GRAM(S): 4; .5 INJECTION, POWDER, LYOPHILIZED, FOR SOLUTION INTRAVENOUS at 14:33

## 2019-01-01 RX ADMIN — Medication 100 MILLIEQUIVALENT(S): at 14:54

## 2019-01-01 RX ADMIN — Medication 25 MILLIGRAM(S): at 05:14

## 2019-01-01 RX ADMIN — Medication 100 MILLIGRAM(S): at 21:28

## 2019-01-01 RX ADMIN — SENNA PLUS 2 TABLET(S): 8.6 TABLET ORAL at 23:26

## 2019-01-01 RX ADMIN — CLOPIDOGREL BISULFATE 75 MILLIGRAM(S): 75 TABLET, FILM COATED ORAL at 13:57

## 2019-01-01 RX ADMIN — Medication 650 MILLIGRAM(S): at 18:54

## 2019-01-01 RX ADMIN — Medication 25 MILLIGRAM(S): at 18:18

## 2019-01-01 RX ADMIN — Medication 25 MILLIGRAM(S): at 05:07

## 2019-01-01 RX ADMIN — Medication 1 DROP(S): at 12:18

## 2019-01-01 RX ADMIN — PIPERACILLIN AND TAZOBACTAM 25 GRAM(S): 4; .5 INJECTION, POWDER, LYOPHILIZED, FOR SOLUTION INTRAVENOUS at 08:50

## 2019-01-01 RX ADMIN — Medication 2.5 MILLIGRAM(S): at 06:25

## 2019-01-01 RX ADMIN — HEPARIN SODIUM 5000 UNIT(S): 5000 INJECTION INTRAVENOUS; SUBCUTANEOUS at 05:15

## 2019-01-01 RX ADMIN — Medication 25 MILLIGRAM(S): at 06:34

## 2019-01-01 RX ADMIN — LATANOPROST 1 DROP(S): 0.05 SOLUTION/ DROPS OPHTHALMIC; TOPICAL at 22:15

## 2019-01-01 RX ADMIN — HEPARIN SODIUM 5000 UNIT(S): 5000 INJECTION INTRAVENOUS; SUBCUTANEOUS at 05:02

## 2019-01-01 RX ADMIN — AMLODIPINE BESYLATE 5 MILLIGRAM(S): 2.5 TABLET ORAL at 05:07

## 2019-01-01 RX ADMIN — Medication 1 ENEMA: at 10:56

## 2019-01-01 RX ADMIN — POLYETHYLENE GLYCOL 3350 17 GRAM(S): 17 POWDER, FOR SOLUTION ORAL at 12:44

## 2019-01-01 RX ADMIN — PANTOPRAZOLE SODIUM 40 MILLIGRAM(S): 20 TABLET, DELAYED RELEASE ORAL at 05:14

## 2019-01-01 RX ADMIN — ESCITALOPRAM OXALATE 10 MILLIGRAM(S): 10 TABLET, FILM COATED ORAL at 16:08

## 2019-01-01 RX ADMIN — MUPIROCIN 1 APPLICATION(S): 20 OINTMENT TOPICAL at 17:26

## 2019-01-01 RX ADMIN — PIPERACILLIN AND TAZOBACTAM 25 GRAM(S): 4; .5 INJECTION, POWDER, LYOPHILIZED, FOR SOLUTION INTRAVENOUS at 13:00

## 2019-01-01 RX ADMIN — Medication 25 MILLIGRAM(S): at 06:25

## 2019-01-01 RX ADMIN — ESCITALOPRAM OXALATE 10 MILLIGRAM(S): 10 TABLET, FILM COATED ORAL at 13:34

## 2019-01-01 RX ADMIN — Medication 0.12 MILLIGRAM(S): at 05:00

## 2019-01-01 RX ADMIN — HEPARIN SODIUM 5000 UNIT(S): 5000 INJECTION INTRAVENOUS; SUBCUTANEOUS at 17:26

## 2019-01-01 RX ADMIN — Medication 1 DROP(S): at 11:36

## 2019-02-25 NOTE — ED PROVIDER NOTE - PROGRESS NOTE DETAILS
per nurse at [facility]: bleeding since last night, "not too much" bleeding. has not been endorsing cp/sob but looks more tired than usual. Has no known hx of vaginal bleeding. PMD is Dr Jewels Awan VSS labs wnl, pt will need tx for UTI and GYN follow up for mild vaginal bleeding.

## 2019-02-25 NOTE — ED ADULT NURSE NOTE - NSIMPLEMENTINTERV_GEN_ALL_ED
Implemented All Fall Risk Interventions:  [place] to call system. Call bell, personal items and telephone within reach. Instruct patient to call for assistance. Room bathroom lighting operational. Non-slip footwear when patient is off stretcher. Physically safe environment: no spills, clutter or unnecessary equipment. Stretcher in lowest position, wheels locked, appropriate side rails in place. Provide visual cue, wrist band, yellow gown, etc. Monitor gait and stability. Monitor for mental status changes and reorient to person, place, and time. Review medications for side effects contributing to fall risk. Reinforce activity limits and safety measures with patient and family.

## 2019-02-25 NOTE — ED ADULT NURSE NOTE - OBJECTIVE STATEMENT
89 y/o female PMH UTI, afib presents to ED via EMS from [facility] c/o vaginal bleeding since yesterday. Per EMS, staff at the home noticed blood in pt's diaper yesterday and more today. Upon arrival, pt A&O x 1, disoriented to time and place. Denying abdominal pain currently but states she at times does feel lower abd. pain. Denying dysuria, chest pain, SOB, fever, chills. Lungs clear b.l. Skin warm, dry, intact. Straight cathed for residual urine with ED tech Euneisha at bedside to confirm sterility. Abd. soft, non-tender, non-distended. Scant bright red blood noted in diaper. Gross motor intact. 20G IV placed in LAC. Safety and comfort provided.

## 2019-02-25 NOTE — ED PROVIDER NOTE - PHYSICAL EXAMINATION
General: Alert, No apparent distress.  Head: Normocephalic and atraumatic.  Eyes: PERRLA with EOMI.  Neck: Supple. Trachea midline.   Cardiac: Normal S1 and S2 w/ RRR. No murmurs appreciated.   Pulmonary: Vesicular breath sounds bilaterally. No increased WOB. No wheezes or crackles.  Abdominal: Soft, mild TTP over suprapubic area.  : chaperone Janie. Dried clotted blood in diaper and around introitus. Rectal exam w/ brown stool. Unable to tolerate full speculum exam, trace blood on glove on manual vaginal exam.  Neurologic: No focal sensory or motor deficits.  Musculoskeletal: Strength appropriate in all 4 extremities for age with no limited ROM.  Skin: Color appropriate for race. Intact, warm, and well-perfused.  Psychiatric: Appropriate mood and affect. No apparent risk to self or others.

## 2019-02-25 NOTE — ED PROVIDER NOTE - CLINICAL SUMMARY MEDICAL DECISION MAKING FREE TEXT BOX
89 F not on any AC presenting with vagnal bleeding, unclear if source is truly vaginal vs urethral or rectal. Will evaluate for true source w/ fobt, UA, vaginal exam, and pursue appropriate dx pathway. Also will draw labs and monitor VS to evaluate for hemodynamic stability. Ddx includes structural vaginal causes such as fibroid vs GYN malignancy vs hemorrhagic cystitis given hx of utis.

## 2019-02-25 NOTE — ED PROVIDER NOTE - NSFOLLOWUPINSTRUCTIONS_ED_ALL_ED_FT
Please seek care if you have new chest pain, shortness of breath, fevers, inability to eat or drink, or worsening of symptoms that brought you to the emergency room today.  Please follow up with your primary care physician in 1-2 days or as soon as possible.     Follow up with the OBGYN clinic within 2-3 days.  Take cephalexin, 500mg twice daily, sent to your pharmacy.

## 2019-02-25 NOTE — ED PROVIDER NOTE - ATTENDING CONTRIBUTION TO CARE
Dr. Gomez (Attending Physician)  Pt. with vaginal bleeding scant on plavix with mild suprapubic tenderness, likely hemorrhagic cystitis, small amount of blood on vaginal exam as well will give follow-up with ob/gyn, but patient is incontinent.

## 2019-02-25 NOTE — ED PROVIDER NOTE - OBJECTIVE STATEMENT
89 F with dementia, HLD AF not on any AC recurrent UTIs and c diff colitis presenting due to 1 day of vaginal bleeding. Has no hx of vaginal bleeding per nurse at NH; no known hx of GYN CA. Pt is unreliable historian however is presently denying cp/sob, light headedness, dysuria, f/c, n/v. She endorses constant dull suprapubic pain.    PMD Jewels Awan

## 2019-02-25 NOTE — ED PROVIDER NOTE - CHIEF COMPLAINT
The patient is a 89y Female complaining of The patient is a 89y Female complaining of vaginal bleeding 9

## 2019-02-25 NOTE — ED PROVIDER NOTE - NSFOLLOWUPCLINICS_GEN_ALL_ED_FT
[facility]st[facility]  Gynceology/OB  [address] [place] [address]  Phone: [phone]  Fax:   Follow Up Time:

## 2019-02-26 PROBLEM — F03.90 UNSPECIFIED DEMENTIA WITHOUT BEHAVIORAL DISTURBANCE: Chronic | Status: ACTIVE | Noted: 2018-03-24

## 2019-02-26 PROBLEM — E78.5 HYPERLIPIDEMIA, UNSPECIFIED: Chronic | Status: ACTIVE | Noted: 2018-06-08

## 2019-02-26 PROBLEM — I25.10 ATHEROSCLEROTIC HEART DISEASE OF NATIVE CORONARY ARTERY WITHOUT ANGINA PECTORIS: Chronic | Status: ACTIVE | Noted: 2018-06-08

## 2019-02-26 PROBLEM — I10 ESSENTIAL (PRIMARY) HYPERTENSION: Chronic | Status: ACTIVE | Noted: 2018-03-24

## 2019-02-26 PROBLEM — K21.9 GASTRO-ESOPHAGEAL REFLUX DISEASE WITHOUT ESOPHAGITIS: Chronic | Status: ACTIVE | Noted: 2018-03-24

## 2019-02-26 PROBLEM — I48.91 UNSPECIFIED ATRIAL FIBRILLATION: Chronic | Status: ACTIVE | Noted: 2018-06-08

## 2019-02-26 PROBLEM — A04.72 ENTEROCOLITIS DUE TO CLOSTRIDIUM DIFFICILE, NOT SPECIFIED AS RECURRENT: Chronic | Status: ACTIVE | Noted: 2018-06-08

## 2019-02-27 NOTE — ED POST DISCHARGE NOTE - ADDITIONAL DOCUMENTATION
2/27/19: Prelim ucx results as above. Patient discharged on Keflex for UTI. Will await final sensitivities to determine need for contact vs appropriate care given. - Tyshawn Vazquez PA-C

## 2019-03-30 NOTE — H&P ADULT - HISTORY OF PRESENT ILLNESS
89 YOF PMHx of Dementia, HTN, HLD, CAD, Afib, p/w cough and sob from [facility]. Pt denies having any symptoms and is upset that she was sent here. Pt appears confused and is not coherently answering questions. Per accompanying paperwork pt has has a worsening productive cough and has appeared to have laboured breathing. Sent in for evaluation.    In the ED, Chest X Ray reveals mid lobe infiltrate and UTI

## 2019-03-30 NOTE — CHART NOTE - NSCHARTNOTEFT_GEN_A_CORE
CHANEL ELMORE  89y, Female    Chief c/o: cough fever, agitation        Vital Signs:  Vital Signs Last 24 Hrs  T(C): 36.7 (30 Mar 2019 11:20), Max: 37.5 (30 Mar 2019 06:00)  T(F): 98 (30 Mar 2019 11:20), Max: 99.5 (30 Mar 2019 06:00)  HR: 75 (30 Mar 2019 12:13) (61 - 88)  BP: 161/80 (30 Mar 2019 12:13) (126/90 - 164/74)  BP(mean): --  RR: 22 (30 Mar 2019 12:13) (20 - 23)  SpO2: 96% (30 Mar 2019 12:13) (94% - 96%)    Labs:                        14.7   12.9  )-----------( 251      ( 30 Mar 2019 07:05 )             43.7     CBC Full  -  ( 30 Mar 2019 07:05 )  WBC Count : 12.9 K/uL  RBC Count : 4.37 M/uL  Hemoglobin : 14.7 g/dL  Hematocrit : 43.7 %  Platelet Count - Automated : 251 K/uL  Mean Cell Volume : 100.0 fl  Mean Cell Hemoglobin : 33.7 pg  Mean Cell Hemoglobin Concentration : 33.7 gm/dL  Auto Neutrophil # : 10.8 K/uL  Auto Lymphocyte # : 1.0 K/uL  Auto Monocyte # : 0.9 K/uL  Auto Eosinophil # : 0.2 K/uL  Auto Basophil # : 0.1 K/uL  Auto Neutrophil % : 83.8 %  Auto Lymphocyte % : 7.6 %  Auto Monocyte % : 6.6 %  Auto Eosinophil % : 1.4 %  Auto Basophil % : 0.5 %    03-30    143  |  106  |  13  ----------------------------<  90  3.9   |  24  |  0.47<L>    Ca    9.1      30 Mar 2019 07:05    TPro  6.6  /  Alb  3.4  /  TBili  0.7  /  DBili  x   /  AST  18  /  ALT  11  /  AlkPhos  77  03-30        LIVER FUNCTIONS - ( 30 Mar 2019 07:05 )  Alb: 3.4 g/dL / Pro: 6.6 g/dL / ALK PHOS: 77 U/L / ALT: 11 U/L / AST: 18 U/L / GGT: x             Adult Advanced Hemodynamics Last 24 Hrs  CVP(mm Hg): --  CVP(cm H2O): --  CO: --  CI: --  PA: --  PA(mean): --  PCWP: --  SVR: --  SVRI: --  PVR: --  PVRI: --  PT/INR - ( 30 Mar 2019 07:05 )   PT: 13.0 sec;   INR: 1.14 ratio         PTT - ( 30 Mar 2019 07:05 )  PTT:26.9 sec    Physical Exam:  PHYSICAL EXAM:      Constitutional:    Eyes:    ENMT:    Neck:    Breasts:    Back:    Respiratory:    Cardiovascular:    Gastrointestinal:    Genitourinary:    Rectal:    Extremities:    Vascular:    Neurological:    Skin:    Lymph Nodes:    Musculoskeletal:    Psychiatric:        Assesment / Plan: CHANEL ELMORE  89y, Female    Chief c/o: cough fever, agitation   89 YOF PMHx of Dementia, HTN, HLD, CAD, Afib, p/w cough and sob from [facility].  Vital Signs:  Vital Signs Last 24 Hrs  T(C): 36.7 (30 Mar 2019 11:20), Max: 37.5 (30 Mar 2019 06:00)  T(F): 98 (30 Mar 2019 11:20), Max: 99.5 (30 Mar 2019 06:00)  HR: 75 (30 Mar 2019 12:13) (61 - 88)  BP: 161/80 (30 Mar 2019 12:13) (126/90 - 164/74)  BP(mean): --  RR: 22 (30 Mar 2019 12:13) (20 - 23)  SpO2: 96% (30 Mar 2019 12:13) (94% - 96%)    Labs:                        14.7   12.9  )-----------( 251      ( 30 Mar 2019 07:05 )             43.7     CBC Full  -  ( 30 Mar 2019 07:05 )  WBC Count : 12.9 K/uL  RBC Count : 4.37 M/uL  Hemoglobin : 14.7 g/dL  Hematocrit : 43.7 %  Platelet Count - Automated : 251 K/uL  Mean Cell Volume : 100.0 fl  Mean Cell Hemoglobin : 33.7 pg  Mean Cell Hemoglobin Concentration : 33.7 gm/dL  Auto Neutrophil # : 10.8 K/uL  Auto Lymphocyte # : 1.0 K/uL  Auto Monocyte # : 0.9 K/uL  Auto Eosinophil # : 0.2 K/uL  Auto Basophil # : 0.1 K/uL  Auto Neutrophil % : 83.8 %  Auto Lymphocyte % : 7.6 %  Auto Monocyte % : 6.6 %  Auto Eosinophil % : 1.4 %  Auto Basophil % : 0.5 %    03-30    143  |  106  |  13  ----------------------------<  90  3.9   |  24  |  0.47<L>    Ca    9.1      30 Mar 2019 07:05    TPro  6.6  /  Alb  3.4  /  TBili  0.7  /  DBili  x   /  AST  18  /  ALT  11  /  AlkPhos  77  03-30        LIVER FUNCTIONS - ( 30 Mar 2019 07:05 )  Alb: 3.4 g/dL / Pro: 6.6 g/dL / ALK PHOS: 77 U/L / ALT: 11 U/L / AST: 18 U/L / GGT: x           A/P  89 YOF PMHx of Dementia, HTN, HLD, CAD, Afib, p/w cough and sob r/o PNA from [facility].  AS PER ATTENDING 1 : A OBSERVATION FOR SAFETY  EKG normal QTC  haldol 0.5 mg IV x 1  ID- PNA- IV antibiotics  blood culture   D/w Dr Patty Guthrie [facility]   D/w Dr Villalta

## 2019-03-30 NOTE — GOALS OF CARE CONVERSATION - PERSONAL ADVANCE DIRECTIVE - CONVERSATION DETAILS
discussed briefly goals of care w HCP/ daughter erik mccallum who lives in [place] - states has hcp forms. also states there is a 'form on the fridge where she lives' which indicates DNR/I which was not sent along w [facility].   confirms pt is DNR/I but agrees w abx at this time for poss aspiration pna.   states knows pt has hx of aspiration and confirms that comfort feeds were elected on past admissions in setting of likely recurrent aspiration.   MOLST form completed.

## 2019-03-30 NOTE — ED ADULT NURSE NOTE - OBJECTIVE STATEMENT
patient COLT from [facility] home due to cough & sob. On examination patient with sob even when resting. (+) bilateral rhonchi. Oxygen saturation 89% on RA. Placed back on 3 liters NC with saturation up to 94%. No skin breakdown. Incontinent of urine. No diarrhea. Placed on upright position. Seen & examined by Dr Chinchilla. PAtient was verbal but disoriented to place & time. Reoriented as needed.

## 2019-03-30 NOTE — H&P ADULT - NSICDXPASTMEDICALHX_GEN_ALL_CORE_FT
PAST MEDICAL HISTORY:  Atrial fibrillation, unspecified type     Clostridium difficile diarrhea     Coronary artery disease involving native coronary artery of native heart without angina pectoris     Dementia     GERD (gastroesophageal reflux disease)     HTN (hypertension)     Hyperlipidemia, unspecified hyperlipidemia type

## 2019-03-30 NOTE — ED ADULT NURSE REASSESSMENT NOTE - NS ED NURSE REASSESS COMMENT FT1
Report received from Aury ANNA. AOx2, aware of name & place, not date, speaking in complete sentences. Pt resting comfortably in stretcher, unlabored, spontaneous respirations NAD. Lung sounds, bilateral rhonchi and crackles, sating 94% on 4L NC. Pt denies any pain at this time. CM in place, NSR HR 62.

## 2019-03-30 NOTE — ED PROVIDER NOTE - CONSTITUTIONAL NOURISHMENT, MLM
After Visit Summary   10/11/2017    Rizwana Aguirre    MRN: [id number]           Patient Information     Date Of Birth          1966        Visit Information        Provider Department      10/11/2017 4:00 PM Kristine Marie MD [facility]        Today's Diagnoses     Hypertension goal BP (blood pressure) < 140/90    -  1    Ductal carcinoma in situ (DCIS) of left breast        Family history of melanoma        Memory change           Follow-ups after your visit        Additional Services     DERMATOLOGY REFERRAL       Your provider has referred you to: [facility]: List of [place] hospitals according to the [facility] [phone]   [email or web address]    Please be aware that coverage of these services is subject to the terms and limitations of your health insurance plan.  Call member services at your health plan with any benefit or coverage questions.      Please bring the following with you to your appointment:    (1) Any X-Rays, CTs or MRIs which have been performed.  Contact the facility where they were done to arrange for  prior to your scheduled appointment.    (2) List of current medications  (3) This referral request   (4) Any documents/labs given to you for this referral                  Who to contact     If you have questions or need follow up information about today's clinic visit or your schedule please contact [facility] directly at [phone].  Normal or non-critical lab and imaging results will be communicated to you by MyChart, letter or phone within 4 business days after the clinic has received the results. If you do not hear from us within 7 days, please contact the clinic through MyChart or phone. If you have a critical or abnormal lab result, we will notify you by phone as soon as possible.  Submit refill requests through [employer] or call your pharmacy and they will forward the refill request to us.  "Please allow 3 business days for your refill to be completed.          Additional Information About Your Visit        abusixhart Information     [facility] gives you secure access to your electronic health record. If you see a primary care provider, you can also send messages to your care team and make appointments. If you have questions, please call your primary care clinic.  If you do not have a primary care provider, please call [phone] and they will assist you.        Care EveryWhere ID     This is your Care EveryWhere ID. This could be used by other organizations to access your [facility] medical records  [id number]        Your Vitals Were     Pulse Temperature Respirations Height Pulse Oximetry BMI (Body Mass Index)    63 98.7  F (37.1  C) (Oral) 12 1.549 m (5' 1\") 99% 38.96 kg/m2       Blood Pressure from Last 3 Encounters:   10/11/17 126/80   10/03/17 125/65   04/18/17 117/68    Weight from Last 3 Encounters:   10/11/17 93.5 kg (206 lb 3.2 oz)   10/03/17 93.4 kg (206 lb)   03/07/17 88 kg (194 lb)              We Performed the Following     Albumin Random Urine Quantitative with Creat Ratio     Comprehensive metabolic panel     DERMATOLOGY REFERRAL     LDL cholesterol direct     TSH with free T4 reflex     Vitamin B12     Vitamin D Deficiency          Today's Medication Changes          These changes are accurate as of: 10/11/17  4:41 PM.  If you have any questions, ask your nurse or doctor.               Start taking these medicines.        Dose/Directions    losartan 50 MG tablet   Commonly known as:  COZAAR   Used for:  Hypertension goal BP (blood pressure) < 140/90   Started by:  Kristine Marie MD        Dose:  50 mg   Take 1 tablet (50 mg) by mouth daily   Quantity:  90 tablet   Refills:  1         Stop taking these medicines if you haven't already. Please contact your care team if you have questions.     lisinopril 10 MG tablet   Commonly known as:  PRINIVIL/ZESTRIL   Stopped by:  Kristine Marie MD     "            Where to get your medicines      These medications [place] sent [place] [facility] [place] - [address]  [address], [facility] [address]     Phone:  [phone]     losartan 50 MG tablet                Primary Care Provider Office Phone # Fax #    Indira Lyguy Aldrich PA-C [phone]       [address]  [facility] [address]        Equal Access to Services     [facility] : Hadii aad ku hadasho Soomaali, waaxda luqadaha, qaybta kaalmada adeegyada, waxay idiin hayaan adeeg kharash la'rishabh . So [facility] [phone].    ATENCIÓN: Si habla español, tiene a new disposición servicios gratuitos de asistencia lingüística. [facility] [phone].    We comply with applicable federal civil rights laws and [place] laws. We do not discriminate on the basis of race, color, national origin, age, disability, sex, sexual orientation, or gender identity.            Thank you!     Thank you for choosing [facility]  for your care. Our goal is always to provide you with excellent care. Hearing back from our patients is one way we can continue to improve our services. Please take a few minutes to complete the written survey that you may receive in the mail after your visit with us. Thank you!             Your Updated Medication List - Protect others around you: Learn how to safely use, store and throw away your medicines at www.disposemymeds.org.          This list is accurate as of: 10/11/17  4:41 PM.  Always use your most recent med list.                   Brand Name Dispense Instructions for use Diagnosis    COQ-10 PO      Take 2 tablets by mouth        hydrOXYzine 10 MG tablet    ATARAX    30 tablet    Take 1 tablet by mouth nightly as needed for itching.    Pruritus       losartan 50 MG tablet    COZAAR    90 tablet    Take 1 tablet (50 mg) by mouth daily    Hypertension goal BP (blood pressure) < 140/90       olopatadine HCl 0.2 % Soln     PATADAY    1 Bottle    Place 1 drop into both eyes daily as needed    Allergic conjunctivitis, bilateral       OMEGA-3 FISH OIL PO      Take 2 g by mouth daily        PROBIOTIC DAILY PO           SUMAtriptan 50 MG tablet    IMITREX    9 tablet    Take 1 tablet (50 mg) by mouth at onset of headache for migraine May repeat dose in 2 hours.  Do not exceed 200 mg in 24 hours    Classic migraine with aura       triamcinolone 0.1 % cream    KENALOG    30 g    Apply sparingly to affected area two times daily for 10-14 days.    Atopic dermatitis, unspecified type          THIN

## 2019-03-30 NOTE — ED PROVIDER NOTE - ATTENDING CONTRIBUTION TO CARE
89 yof pmhx signif dementia, lives at the [facility], htn, hld, cad, a fib p/w few days of cough and worsening sob as per [facility] staff report. pt is unable to provide any reliable hx and states 'I want to go back' over and over again.     ros: unobtainable due to dementia    Physical Exam:   constitutional - well appearing, awake and alert, oriented x0, mod dementia, does not answer questions or follow commands   head - no external evidence of trauma  eent - no conjunctival injection or icterus, mucous membranes moist   cvs - rrr, no murmurs, no peripheral edema  resp - crackles bilat, mildly laboured respiratory effort  gi - abdomen soft and nontender, no rigidity, guarding or rebound, bowel sounds present  msk - moving all extremities spontaneously, gait is at baseline for patient  neuro - alert and oriented x0, no focal deficits, CNs 2-12 grossly intact  skin- no jaundice, warm and dry    likely pna vs viral syndrome/ uri. mildly incr wob, wmildly hypoxic requiring 02, will admit for further eval. discussed care and advanced directives w daughter as per goals of care note. ADELINE Chinchilla MD

## 2019-03-30 NOTE — ED PROVIDER NOTE - CLINICAL SUMMARY MEDICAL DECISION MAKING FREE TEXT BOX
89F CAD AFIb Dementia p/w sob and cough x 1 day per [facility] staff. VSWNL. Coarse bs throughout on pulmonary exam. Plan cultures abx labs vbg, tba.

## 2019-03-30 NOTE — ED ADULT NURSE REASSESSMENT NOTE - NS ED NURSE REASSESS COMMENT FT1
Pt reporting h/a, MD Tenorio aware & Tylenol administered as prescribed. Pt resting comfortably in stretcher, unlabored, spontaneous respirations, NAD.

## 2019-03-31 NOTE — BEHAVIORAL HEALTH ASSESSMENT NOTE - CASE SUMMARY
rupert is 89y/o  F living in assisted living facility, no prior psychiatric hospitalization, started on lexapro 2 years ago by PCP for depression, PMHx dementia, HTN, HLD, CAD, Afib, admitted and being treated for pneumonia, currently on enhanced supervision for agitation. Patient at this time is oriented x1, lethargic, not agitated, denies S/H/I/I/P, denies paranoia, denies VH, denies depressed mood, denies recent substance use, denies insomnia, endorses poor appetite and intermittent AH (mumbling). As per daughter, patient has dementia and at baseline, patient is AAOx1. Patient often becomes more confused in new settings and when she has acute infection.  She tried to reach out to this MD shoulder, she seems to be responding to internal stimuli, She is alert, however not oriented to place, time , only to herself.

## 2019-03-31 NOTE — BEHAVIORAL HEALTH ASSESSMENT NOTE - SUMMARY
Patient is 89y/o  F living in assisted living facility, no prior psychiatric hospitalization, started on lexapro 2 years ago by PCP for depression, PMHx dementia, HTN, HLD, CAD, Afib, admitted and being treated for pneumonia, currently on enhanced supervision for agitation. Patient at this time is oriented x1, lethargic, not agitated, denies S/H/I/I/P, denies paranoia, denies VH, denies depressed mood, denies recent substance use, denies insomnia, endorses poor appetite and intermittent AH (mumbling). As per daughter, patient has dementia and at baseline, patient is AAOx1. Patient often becomes more confused in new settings and when she has acute infection.

## 2019-03-31 NOTE — BEHAVIORAL HEALTH ASSESSMENT NOTE - OTHER PAST PSYCHIATRIC HISTORY (INCLUDE DETAILS REGARDING ONSET, COURSE OF ILLNESS, INPATIENT/OUTPATIENT TREATMENT)
started on lexapro for some depressive symptoms approximately 2 years ago by PCP. No other prior psychiatric history

## 2019-03-31 NOTE — BEHAVIORAL HEALTH ASSESSMENT NOTE - NSBHCONSULTFOLLOWAFTERCARE_PSY_A_CORE FT
patient may go back to [facility] assisted living facility when medically stable and appropriate.

## 2019-03-31 NOTE — CONSULT NOTE ADULT - SUBJECTIVE AND OBJECTIVE BOX
Patient is a 89y old  Female who presents with a chief complaint of     HPI:    89 YOF PMHx of Dementia, HTN, HLD, CAD, Afib, p/w cough and sob from [facility]. Pt denies having any symptoms and is upset that she was sent here. Pt appears confused and is not coherently answering questions. Per accompanying paperwork pt has a worsening productive cough and has appeared to have laboured breathing. Sent in for evaluation.    In the ED, Chest X Ray reveals mid lobe infiltrate. (30 Mar 2019 18:09)    ER vss.   Afebrile.  WBC 12.9.  Lact 2.2.  RVP (+) entero/rhinovirus.  CXR with b/l medial lung opacities.  Pt on vanco/zosyn for pna.   Pt has HCP/daughter (Mahi Kirkpatrick) who lives in [place]  - pt is DNR/I.  Reported known h/o of aspiration and confirms that comfort feeds were elected on past admissions in setting of likely recurrent aspiration.   HCP declined swallow eval as per chart notes.    ID consult called for further abx managment.              REVIEW OF SYSTEMS:    CONSTITUTIONAL: No fever, weight loss, or fatigue  EYES: No eye pain, visual disturbances, or discharge  ENMT:  No sore throat  NECK: No pain or stiffness  RESPIRATORY: No cough, wheezing, chills or hemoptysis; No shortness of breath  CARDIOVASCULAR: No chest pain, palpitations, dizziness, or leg swelling  GASTROINTESTINAL: No abdominal or epigastric pain. No nausea, vomiting, or hematemesis; No diarrhea or constipation. No melena or hematochezia.  GENITOURINARY: No dysuria, frequency, hematuria, or incontinence  NEUROLOGICAL: No headaches, memory loss, loss of strength, numbness, or tremors  SKIN: No itching, burning, rashes, or lesions   LYMPH NODES: No enlarged glands  MUSCULOSKELETAL: No joint pain or swelling; No muscle, back, or extremity pain      PAST MEDICAL & SURGICAL HISTORY:  Clostridium difficile diarrhea  Hyperlipidemia, unspecified hyperlipidemia type  Coronary artery disease involving native coronary artery of native heart without angina pectoris  Atrial fibrillation, unspecified type  GERD (gastroesophageal reflux disease)  HTN (hypertension)  Dementia  No significant past surgical history      Allergies    No Known Allergies    Intolerances        FAMILY HISTORY:  No pertinent family history in first degree relatives      SOCIAL HISTORY:    u/a    MEDICATIONS  (STANDING):  amLODIPine   Tablet 5 milliGRAM(s) Oral daily  clopidogrel Tablet 75 milliGRAM(s) Oral daily  digoxin     Tablet 0.125 milliGRAM(s) Oral daily  enalapril 2.5 milliGRAM(s) Oral daily  escitalopram 10 milliGRAM(s) Oral daily  guaiFENesin/dextromethorphan  Syrup 10 milliLiter(s) Oral every 4 hours  heparin  Injectable 5000 Unit(s) SubCutaneous every 12 hours  latanoprost 0.005% Ophthalmic Solution 1 Drop(s) Both EYES at bedtime  metoprolol tartrate 25 milliGRAM(s) Oral every 12 hours  pantoprazole    Tablet 40 milliGRAM(s) Oral before breakfast  piperacillin/tazobactam IVPB. 3.375 Gram(s) IV Intermittent every 8 hours  timolol 0.5% Solution 1 Drop(s) Left EYE daily  vancomycin  IVPB 1000 milliGRAM(s) IV Intermittent every 12 hours    MEDICATIONS  (PRN):  acetaminophen   Tablet .. 650 milliGRAM(s) Oral every 6 hours PRN Temp greater or equal to 38C (100.4F), Moderate Pain (4 - 6)      Vital Signs Last 24 Hrs  T(C): 36.6 (31 Mar 2019 14:30), Max: 37.2 (30 Mar 2019 22:43)  T(F): 97.9 (31 Mar 2019 14:30), Max: 98.9 (30 Mar 2019 22:43)  HR: 59 (31 Mar 2019 14:30) (59 - 83)  BP: 146/79 (31 Mar 2019 14:30) (146/79 - 170/82)  BP(mean): --  RR: 18 (31 Mar 2019 14:30) (18 - 19)  SpO2: 90% (31 Mar 2019 14:30) (90% - 95%)    PHYSICAL EXAM:    GENERAL: NAD, well-groomed  HEAD:  Atraumatic, Normocephalic  EYES: EOMI, PERRLA, conjunctiva and sclera clear  ENMT: No tonsillar erythema, exudates, or enlargement; Moist mucous membranes  NECK: Supple, No JVD  CHEST/LUNG: Clear to percussion bilaterally; No rales, rhonchi, wheezing, or rubs  HEART: Regular rate and rhythm; No murmurs, rubs, or gallops  ABDOMEN: Soft, Nontender, Nondistended; Bowel sounds present  EXTREMITIES:  2+ Peripheral Pulses, No clubbing, cyanosis, or edema  LYMPH: No lymphadenopathy noted  SKIN: No rashes or lesions    LABS:  CBC Full  -  ( 30 Mar 2019 07:05 )  WBC Count : 12.9 K/uL  RBC Count : 4.37 M/uL  Hemoglobin : 14.7 g/dL  Hematocrit : 43.7 %  Platelet Count - Automated : 251 K/uL  Mean Cell Volume : 100.0 fl  Mean Cell Hemoglobin : 33.7 pg  Mean Cell Hemoglobin Concentration : 33.7 gm/dL  Auto Neutrophil # : 10.8 K/uL  Auto Lymphocyte # : 1.0 K/uL  Auto Monocyte # : 0.9 K/uL  Auto Eosinophil # : 0.2 K/uL  Auto Basophil # : 0.1 K/uL  Auto Neutrophil % : 83.8 %  Auto Lymphocyte % : 7.6 %  Auto Monocyte % : 6.6 %  Auto Eosinophil % : 1.4 %  Auto Basophil % : 0.5 %      03-30    143  |  106  |  13  ----------------------------<  90  3.9   |  24  |  0.47<L>    Ca    9.1      30 Mar 2019 07:05    TPro  6.6  /  Alb  3.4  /  TBili  0.7  /  DBili  x   /  AST  18  /  ALT  11  /  AlkPhos  77  03-30      LIVER FUNCTIONS - ( 30 Mar 2019 07:05 )  Alb: 3.4 g/dL / Pro: 6.6 g/dL / ALK PHOS: 77 U/L / ALT: 11 U/L / AST: 18 U/L / GGT: x                   MICROBIOLOGY:        Culture - Blood (03.30.19 @ 10:39)    Specimen Source: .Blood Blood    Culture Results:   No growth to date.    Culture - Blood (03.30.19 @ 10:39)    Specimen Source: .Blood Blood    Culture Results:   No growth to date.                  RADIOLOGY:    < from: Xray Chest 1 View AP/PA (03.30.19 @ 07:01) >  FINDINGS:   The heart size is not accurately evaluated.  Medial right mid to lower lung and left lower lung opacities. There are   no pleural effusions or pneumothorax.  Degenerative changes of the thoracic spine.    IMPRESSION:   Bilateral medial lung opacities which may represent atelectasis and/or   pneumonia.    < end of copied text > Patient is a 89y old  Female who presents with a chief complaint of     HPI:    89 YOF PMHx of Dementia, HTN, HLD, CAD, Afib, p/w cough and sob from [facility]. Pt denies having any symptoms and is upset that she was sent here. Pt appears confused and is not coherently answering questions. Per accompanying paperwork pt has a worsening productive cough and has appeared to have laboured breathing. Sent in for evaluation.    In the ED, Chest X Ray reveals mid lobe infiltrate. (30 Mar 2019 18:09)    ER vss.   Afebrile.  WBC 12.9.  Lact 2.2.  RVP (+) entero/rhinovirus.  CXR with b/l medial lung opacities.  Pt on vanco/zosyn for pna.   Pt has HCP/daughter (Mahi Kirkpatrick) who lives in [place]  - pt is DNR/I.  Reported known h/o of aspiration and confirms that comfort feeds were elected on past admissions in setting of likely recurrent aspiration.   HCP declined swallow eval as per chart notes.    ID consult called for further abx managment.              REVIEW OF SYSTEMS:    CONSTITUTIONAL: No fever, weight loss, or fatigue  EYES: No eye pain, visual disturbances, or discharge  ENMT:  No sore throat  NECK: No pain or stiffness  RESPIRATORY: + productive cough  CARDIOVASCULAR: No chest pain, palpitations, dizziness, or leg swelling  GASTROINTESTINAL: No abdominal or epigastric pain. No nausea, vomiting, or hematemesis; No diarrhea or constipation. No melena or hematochezia.  GENITOURINARY: No dysuria, frequency, hematuria, or incontinence  NEUROLOGICAL: No headaches, memory loss, loss of strength, numbness, or tremors  SKIN: No itching, burning, rashes, or lesions   LYMPH NODES: No enlarged glands  MUSCULOSKELETAL: No joint pain or swelling; No muscle, back, or extremity pain      PAST MEDICAL & SURGICAL HISTORY:  Clostridium difficile diarrhea  Hyperlipidemia, unspecified hyperlipidemia type  Coronary artery disease involving native coronary artery of native heart without angina pectoris  Atrial fibrillation, unspecified type  GERD (gastroesophageal reflux disease)  HTN (hypertension)  Dementia  No significant past surgical history      Allergies    No Known Allergies    Intolerances        FAMILY HISTORY:  No pertinent family history in first degree relatives      SOCIAL HISTORY:    u/a    MEDICATIONS  (STANDING):  amLODIPine   Tablet 5 milliGRAM(s) Oral daily  clopidogrel Tablet 75 milliGRAM(s) Oral daily  digoxin     Tablet 0.125 milliGRAM(s) Oral daily  enalapril 2.5 milliGRAM(s) Oral daily  escitalopram 10 milliGRAM(s) Oral daily  guaiFENesin/dextromethorphan  Syrup 10 milliLiter(s) Oral every 4 hours  heparin  Injectable 5000 Unit(s) SubCutaneous every 12 hours  latanoprost 0.005% Ophthalmic Solution 1 Drop(s) Both EYES at bedtime  metoprolol tartrate 25 milliGRAM(s) Oral every 12 hours  pantoprazole    Tablet 40 milliGRAM(s) Oral before breakfast  piperacillin/tazobactam IVPB. 3.375 Gram(s) IV Intermittent every 8 hours  timolol 0.5% Solution 1 Drop(s) Left EYE daily  vancomycin  IVPB 1000 milliGRAM(s) IV Intermittent every 12 hours    MEDICATIONS  (PRN):  acetaminophen   Tablet .. 650 milliGRAM(s) Oral every 6 hours PRN Temp greater or equal to 38C (100.4F), Moderate Pain (4 - 6)      Vital Signs Last 24 Hrs  T(C): 36.6 (31 Mar 2019 14:30), Max: 37.2 (30 Mar 2019 22:43)  T(F): 97.9 (31 Mar 2019 14:30), Max: 98.9 (30 Mar 2019 22:43)  HR: 59 (31 Mar 2019 14:30) (59 - 83)  BP: 146/79 (31 Mar 2019 14:30) (146/79 - 170/82)  BP(mean): --  RR: 18 (31 Mar 2019 14:30) (18 - 19)  SpO2: 90% (31 Mar 2019 14:30) (90% - 95%)    PHYSICAL EXAM:    GENERAL: NAD, well-groomed  HEAD:  Atraumatic, Normocephalic  EYES: EOMI, PERRLA, conjunctiva and sclera clear  ENMT: No tonsillar erythema, exudates, or enlargement; Moist mucous membranes  NECK: Supple, No JVD  CHEST/LUNG: decr BS at bases.  (+) coarse BS at b/l, (+) congestion  HEART: Regular rate and rhythm; No murmurs, rubs, or gallops  ABDOMEN: Soft, Nontender, Nondistended; Bowel sounds present  EXTREMITIES:  2+ Peripheral Pulses, No clubbing, cyanosis, or edema  LYMPH: No lymphadenopathy noted  SKIN: No rashes or lesions    LABS:  CBC Full  -  ( 30 Mar 2019 07:05 )  WBC Count : 12.9 K/uL  RBC Count : 4.37 M/uL  Hemoglobin : 14.7 g/dL  Hematocrit : 43.7 %  Platelet Count - Automated : 251 K/uL  Mean Cell Volume : 100.0 fl  Mean Cell Hemoglobin : 33.7 pg  Mean Cell Hemoglobin Concentration : 33.7 gm/dL  Auto Neutrophil # : 10.8 K/uL  Auto Lymphocyte # : 1.0 K/uL  Auto Monocyte # : 0.9 K/uL  Auto Eosinophil # : 0.2 K/uL  Auto Basophil # : 0.1 K/uL  Auto Neutrophil % : 83.8 %  Auto Lymphocyte % : 7.6 %  Auto Monocyte % : 6.6 %  Auto Eosinophil % : 1.4 %  Auto Basophil % : 0.5 %      03-30    143  |  106  |  13  ----------------------------<  90  3.9   |  24  |  0.47<L>    Ca    9.1      30 Mar 2019 07:05    TPro  6.6  /  Alb  3.4  /  TBili  0.7  /  DBili  x   /  AST  18  /  ALT  11  /  AlkPhos  77  03-30      LIVER FUNCTIONS - ( 30 Mar 2019 07:05 )  Alb: 3.4 g/dL / Pro: 6.6 g/dL / ALK PHOS: 77 U/L / ALT: 11 U/L / AST: 18 U/L / GGT: x                   MICROBIOLOGY:        Culture - Blood (03.30.19 @ 10:39)    Specimen Source: .Blood Blood    Culture Results:   No growth to date.    Culture - Blood (03.30.19 @ 10:39)    Specimen Source: .Blood Blood    Culture Results:   No growth to date.                  RADIOLOGY:    < from: Xray Chest 1 View AP/PA (03.30.19 @ 07:01) >  FINDINGS:   The heart size is not accurately evaluated.  Medial right mid to lower lung and left lower lung opacities. There are   no pleural effusions or pneumothorax.  Degenerative changes of the thoracic spine.    IMPRESSION:   Bilateral medial lung opacities which may represent atelectasis and/or   pneumonia.    < end of copied text >

## 2019-03-31 NOTE — BEHAVIORAL HEALTH ASSESSMENT NOTE - HPI (INCLUDE ILLNESS QUALITY, SEVERITY, DURATION, TIMING, CONTEXT, MODIFYING FACTORS, ASSOCIATED SIGNS AND SYMPTOMS)
Patient is 89y/o  F living in assisted living facility, no prior psychiatric hospitalization, started on lexapro 2 years ago by PCP for depression, PMHx dementia, HTN, HLD, CAD, Afib, admitted and being treated for pneumonia. Patient currently on enhanced supervision for agitation.     Patient is somnolent but is responsive to voice, however she keeps her eyes closed for most of the interview and only speaks in short phrases, mostly "yes" or "no." Patient is oriented to name, not place, time, or situation. Patient states that she feels sleepy, and is feeling "okay except when [I'm] coughing." She states she is not in pain, denies depressed mood, denies recent substance use, denies insomnia, denies SI/HI, denies paranoia, denies VH, endorses occasional AH which she describes as "mumbling." Patient spontaneously asks "are all the girls up?" but does not explain who "the girls" are. Patient states she has a poor appetite, but is unable to elaborate. Patient states "this is tiring, I'm sleepy, you're sleepy, can we stop this?" and concludes the interview.   As per nurse, patient has been calm all night last night, and today. patient slept well last night, and has been sleeping during the day as well.    Collateral obtained from daughter, states mother becomes confused when she is in a new setting, and whenever she has a UTI or other type of infection. Patient has never been on haldol, last time was given xanax. Daughter would not be comfortable with patient being on standing medication for delirium, but is okay with PRN haldol. Patient at baseline knows who she is, but may not recognize her children, often does not know where she is, or the year. Patient was started on lexapro by her PCP 2 years ago, but has no prior history of depression or mental illness.

## 2019-03-31 NOTE — CHART NOTE - NSCHARTNOTEFT_GEN_A_CORE
Medicine NP    Patient asleep in bed since arrival to floor. Per aide at bedside, pt without restlessness or agitation and climbing out of bed. Constant observation downgraded to enhanced. Safetly/fall precautions in place, bed alarm on at all times. D/W RN.     Lara Patrick, [facility]  [phone]

## 2019-03-31 NOTE — BEHAVIORAL HEALTH ASSESSMENT NOTE - NSBHCHARTREVIEWLAB_PSY_A_CORE FT
14.7   12.9  )-----------( 251      ( 30 Mar 2019 07:05 )             43.7     03-30    143  |  106  |  13  ----------------------------<  90  3.9   |  24  |  0.47<L>    Ca    9.1      30 Mar 2019 07:05    TPro  6.6  /  Alb  3.4  /  TBili  0.7  /  DBili  x   /  AST  18  /  ALT  11  /  AlkPhos  77  03-30

## 2019-03-31 NOTE — CONSULT NOTE ADULT - ASSESSMENT
89 YOF PMHx of Dementia, HTN, HLD, CAD, Afib, p/w cough and sob from [facility]. Pt denies having any symptoms and is upset that she was sent here. Pt appears confused and is not coherently answering questions. Per accompanying paperwork pt has a worsening productive cough and has appeared to have laboured breathing. Sent in for evaluation.    In the ED, Chest X Ray reveals mid lobe infiltrate. (30 Mar 2019 18:09)    ER vss.   Afebrile.  WBC 12.9.  Lact 2.2.  RVP (+) entero/rhinovirus.  CXR with b/l medial lung opacities.  Pt on vanco/zosyn for pna.   Pt has HCP/daughter (Mahi Kirkpatrick) who lives in [place]  - pt is DNR/I.  Reported known h/o of aspiration and confirms that comfort feeds were elected on past admissions in setting of likely recurrent aspiration.   HCP declined swallow eval as per chart notes.    ID consult called for further abx managment.        Recommend:    - Pt with viral URI with entero/rhinovirus.  Cont supportive care, no role for antivirals.    - Pt also with dysphagia, and is on comfort feeds as per chart notes and d/w HCP.  Pt with reported cough with thin liquids.  HCP refusing swallow evaluation, and understands risk for aspiration.  Agree with coverage for possible superimposed bacterial pna/aspiration.  Cont vanco/zosyn.  Also cover for HCAP.     - Check MRSA pcr, if (-), can d/c vanco.    - f/u wbc, temp curve.     - blood cx x 2    - check vanco trough, maintain between 10-15      Will follow,    Bernice Murphy  [phone]

## 2019-03-31 NOTE — BEHAVIORAL HEALTH ASSESSMENT NOTE - NSBHCHARTREVIEWVS_PSY_A_CORE FT
Vital Signs Last 24 Hrs  T(C): 36.6 (31 Mar 2019 14:30), Max: 37.2 (30 Mar 2019 22:43)  T(F): 97.9 (31 Mar 2019 14:30), Max: 98.9 (30 Mar 2019 22:43)  HR: 59 (31 Mar 2019 14:30) (59 - 83)  BP: 146/79 (31 Mar 2019 14:30) (146/79 - 170/82)  BP(mean): --  RR: 18 (31 Mar 2019 14:30) (18 - 19)  SpO2: 90% (31 Mar 2019 14:30) (90% - 95%)

## 2019-03-31 NOTE — BEHAVIORAL HEALTH ASSESSMENT NOTE - NSBHCONSULTRECOMMENDOTHER_PSY_A_CORE FT
Minimize use of benzos, opioids, anticholinergics, or other deliriogenic agents when possible.  Maintain sleep wake cycle.  Provide frequent reorientation and redirection.  Family member at bedside if possible. Assess for need for glasses and hearing aid (if applicable). Minimize use of benzos, opioids, anticholinergics, or other deliriogenic agents when possible.  Maintain sleep wake cycle, melatonin 3 mg QHS .  Provide frequent reorientation and redirection.  Family member at bedside if possible. Assess for need for glasses and hearing aid (if applicable).

## 2019-03-31 NOTE — BEHAVIORAL HEALTH ASSESSMENT NOTE - RISK ASSESSMENT
Patient is at elevated risk due to altered mental status, dementia, age, acute illness, however however risks are mitigated by protective factors: patient denies depressed mood, denies suicidality, is in hospital setting, lives in assisted living facility, no prior psychiatric hospitalization, no prior suicide attempts, has supportive family members.

## 2019-03-31 NOTE — CHART NOTE - NSCHARTNOTEFT_GEN_A_CORE
Per RN pt coughing with thin liquids. Spoke to daughter HCP Mahi. She is refusing speech and swallow at this time. Agrees with Dysphagia diet 1 with HTL. aspiration precaution. She is aware of risk of aspiration. d/w Dr. Brambila.

## 2019-03-31 NOTE — BEHAVIORAL HEALTH ASSESSMENT NOTE - NSBHCHARTREVIEWINVESTIGATE_PSY_A_CORE FT
< from: 12 Lead ECG (03.30.19 @ 06:03) >      Ventricular Rate 56 BPM    Atrial Rate 41 BPM    P-R Interval 246 ms    QRS Duration 136 ms    Q-T Interval 432 ms    QTC Calculation(Bezet) 416 ms    P Axis 79 degrees    R Axis 34 degrees    T Axis -43 degrees    Diagnosis Line UNDETERMINED RHYTHM  RIGHT BUNDLE BRANCH BLOCK  POSSIBLE INFERIOR INFARCT , AGE UNDETERMINED  ABNORMAL ECG    Confirmed by ATTENDING, ED ([id number]),  TARA WEINSTEIN ([id number]) on 3/30/2019 10:08:36 AM

## 2019-03-31 NOTE — PROGRESS NOTE ADULT - SUBJECTIVE AND OBJECTIVE BOX
Patient is a 89y old  Female who presents with a chief complaint of cough and sob (31 Mar 2019 16:27)      SUBJECTIVE / OVERNIGHT EVENTS:  Confused, cough +  Review of Systems:   CONSTITUTIONAL: No fever, weight loss, or fatigue  EYES: No eye pain, visual disturbances, or discharge  ENMT:  No difficulty hearing, tinnitus, vertigo; No sinus or throat pain  NECK: No pain or stiffness  BREASTS: No pain, masses, or nipple discharge  RESPIRATORY: No cough, wheezing, chills or hemoptysis; No shortness of breath  CARDIOVASCULAR: No chest pain, palpitations, dizziness, or leg swelling  GASTROINTESTINAL: No abdominal or epigastric pain. No nausea, vomiting, or hematemesis; No diarrhea or constipation. No melena or hematochezia.  GENITOURINARY: No dysuria, frequency, hematuria, or incontinence  NEUROLOGICAL: No headaches, memory loss, loss of strength, numbness, or tremors  SKIN: No itching, burning, rashes, or lesions   LYMPH NODES: No enlarged glands  ENDOCRINE: No heat or cold intolerance; No hair loss  MUSCULOSKELETAL: No joint pain or swelling; No muscle, back, or extremity pain  PSYCHIATRIC: No depression, anxiety, mood swings, or difficulty sleeping  HEME/LYMPH: No easy bruising, or bleeding gums  ALLERY AND IMMUNOLOGIC: No hives or eczema    MEDICATIONS  (STANDING):  amLODIPine   Tablet 5 milliGRAM(s) Oral daily  clopidogrel Tablet 75 milliGRAM(s) Oral daily  digoxin     Tablet 0.125 milliGRAM(s) Oral daily  enalapril 2.5 milliGRAM(s) Oral daily  escitalopram 10 milliGRAM(s) Oral daily  guaiFENesin/dextromethorphan  Syrup 10 milliLiter(s) Oral every 4 hours  heparin  Injectable 5000 Unit(s) SubCutaneous every 12 hours  latanoprost 0.005% Ophthalmic Solution 1 Drop(s) Both EYES at bedtime  metoprolol tartrate 25 milliGRAM(s) Oral every 12 hours  pantoprazole    Tablet 40 milliGRAM(s) Oral before breakfast  piperacillin/tazobactam IVPB. 3.375 Gram(s) IV Intermittent every 8 hours  timolol 0.5% Solution 1 Drop(s) Left EYE daily  vancomycin  IVPB 1000 milliGRAM(s) IV Intermittent every 12 hours    MEDICATIONS  (PRN):  acetaminophen   Tablet .. 650 milliGRAM(s) Oral every 6 hours PRN Temp greater or equal to 38C (100.4F), Moderate Pain (4 - 6)  haloperidol    Concentrate 0.5 milliGRAM(s) Oral every 12 hours PRN Agitation      PHYSICAL EXAM:  Vital Signs Last 24 Hrs  T(C): 36.5 (01 Apr 2019 04:38), Max: 36.6 (31 Mar 2019 14:30)  T(F): 97.7 (01 Apr 2019 04:38), Max: 97.9 (31 Mar 2019 14:30)  HR: 70 (01 Apr 2019 04:38) (59 - 70)  BP: 123/77 (01 Apr 2019 04:38) (123/77 - 146/79)  BP(mean): --  RR: 18 (01 Apr 2019 04:38) (18 - 18)  SpO2: 92% (01 Apr 2019 04:38) (90% - 94%)  I&O's Summary    31 Mar 2019 07:01  -  01 Apr 2019 07:00  --------------------------------------------------------  IN: 1610 mL / OUT: 0 mL / NET: 1610 mL      GENERAL: NAD, well-developed  HEAD:  Atraumatic, Normocephalic  EYES: EOMI, PERRLA, conjunctiva and sclera clear  NECK: Supple, No JVD  CHEST/LUNG: Clear to auscultation bilaterally; No wheeze  HEART: Regular rate and rhythm; No murmurs, rubs, or gallops  ABDOMEN: Soft, Nontender, Nondistended; Bowel sounds present  EXTREMITIES:  2+ Peripheral Pulses, No clubbing, cyanosis, or edema  PSYCH: AAOx3  NEUROLOGY: non-focal  SKIN: No rashes or lesions    LABS:  CAPILLARY BLOOD GLUCOSE                              13.7   11.08 )-----------( 259      ( 01 Apr 2019 09:35 )             42.6     04-01    142  |  103  |  10  ----------------------------<  106<H>  3.5   |  25  |  0.58    Ca    8.9      01 Apr 2019 06:15                RADIOLOGY & ADDITIONAL TESTS:    Imaging Personally Reviewed:    Consultant(s) Notes Reviewed:      Care Discussed with Consultants/Other Providers:

## 2019-04-01 NOTE — PROGRESS NOTE ADULT - ASSESSMENT
89 YOF PMHx of Dementia, HTN, HLD, CAD, Afib, p/w cough and sob from [facility]. Pt denies having any symptoms and is upset that she was sent here. Pt appears confused and is not coherently answering questions. Per accompanying paperwork pt has a worsening productive cough and has appeared to have laboured breathing. Sent in for evaluation.    In the ED, Chest X Ray reveals mid lobe infiltrate. (30 Mar 2019 18:09)    ER vss.   Afebrile.  WBC 12.9.  Lact 2.2.  RVP (+) entero/rhinovirus.  CXR with b/l medial lung opacities.  Pt on vanco/zosyn for pna.   Pt has HCP/daughter (Mahi Kirkpatrick) who lives in [place]  - pt is DNR/I.  Reported known h/o of aspiration and confirms that comfort feeds were elected on past admissions in setting of likely recurrent aspiration.   HCP declined swallow eval as per chart notes.    ID consult called for further abx managment.        Recommend:    - Pt with viral URI with entero/rhinovirus.  Cont supportive care, no role for antivirals.    - Pt also with dysphagia, and is on comfort feeds as per chart notes and d/w HCP.  Pt with reported cough with thin liquids.  HCP refusing swallow evaluation, and understands risk for aspiration.  Agree with coverage for possible superimposed bacterial pna/aspiration.  Cont vanco/zosyn.  Also cover for HCAP.   Complete 7 day course.      - Both MRSA/MSSA pcr (+).  Will cont vanco on empiric basis.  Recommend mupirocin for decolonization    - f/u wbc, temp curve.     - blood cx x 2    - check vanco trough, maintain between 10-15      PT eval      Will follow,    Bernice Murphy  [phone]

## 2019-04-01 NOTE — DISCHARGE NOTE NURSING/CASE MANAGEMENT/SOCIAL WORK - NSDCDPATPORTLINK_GEN_ALL_CORE
You can access the [facility] Patient Portal, offered by [facility], by registering with the following website: [email or web address]

## 2019-04-01 NOTE — PHYSICAL THERAPY INITIAL EVALUATION ADULT - PERTINENT HX OF CURRENT PROBLEM, REHAB EVAL
Pt is a 89 y.o. F PMHx of Dementia, HTN, HLD, CAD, Afib, p/w cough and sob from [facility]. Per accompanying paperwork pt has has a worsening productive cough and has appeared to have laboured breathing, found to have aspiration PNA. CXR 3/30: Bilateral medial lung opacities which may represent atelectasis and/or pneumonia.

## 2019-04-01 NOTE — PHYSICAL THERAPY INITIAL EVALUATION ADULT - ADDITIONAL COMMENTS
Pt is a poor historian as per chart: pt resided at assisted living and was ambulating w/ RW. Pt required assist w/ ADLs.

## 2019-04-01 NOTE — PROGRESS NOTE ADULT - SUBJECTIVE AND OBJECTIVE BOX
Patient is a 89y old  Female who presents with a chief complaint of cough and sob (31 Mar 2019 16:27)      SUBJECTIVE / OVERNIGHT EVENTS:  cough ++, afebrile  Review of Systems:   CONSTITUTIONAL: No fever, weight loss, or fatigue  EYES: No eye pain, visual disturbances, or discharge  ENMT:  No difficulty hearing, tinnitus, vertigo; No sinus or throat pain  NECK: No pain or stiffness  BREASTS: No pain, masses, or nipple discharge  RESPIRATORY: No cough, wheezing, chills or hemoptysis; No shortness of breath  CARDIOVASCULAR: No chest pain, palpitations, dizziness, or leg swelling  GASTROINTESTINAL: No abdominal or epigastric pain. No nausea, vomiting, or hematemesis; No diarrhea or constipation. No melena or hematochezia.  GENITOURINARY: No dysuria, frequency, hematuria, or incontinence  NEUROLOGICAL: No headaches, memory loss, loss of strength, numbness, or tremors  SKIN: No itching, burning, rashes, or lesions   LYMPH NODES: No enlarged glands  ENDOCRINE: No heat or cold intolerance; No hair loss  MUSCULOSKELETAL: No joint pain or swelling; No muscle, back, or extremity pain  PSYCHIATRIC: No depression, anxiety, mood swings, or difficulty sleeping  HEME/LYMPH: No easy bruising, or bleeding gums  ALLERY AND IMMUNOLOGIC: No hives or eczema    MEDICATIONS  (STANDING):  amLODIPine   Tablet 5 milliGRAM(s) Oral daily  clopidogrel Tablet 75 milliGRAM(s) Oral daily  digoxin     Tablet 0.125 milliGRAM(s) Oral daily  enalapril 2.5 milliGRAM(s) Oral daily  escitalopram 10 milliGRAM(s) Oral daily  guaiFENesin/dextromethorphan  Syrup 10 milliLiter(s) Oral every 4 hours  heparin  Injectable 5000 Unit(s) SubCutaneous every 12 hours  latanoprost 0.005% Ophthalmic Solution 1 Drop(s) Both EYES at bedtime  metoprolol tartrate 25 milliGRAM(s) Oral every 12 hours  pantoprazole    Tablet 40 milliGRAM(s) Oral before breakfast  piperacillin/tazobactam IVPB. 3.375 Gram(s) IV Intermittent every 8 hours  timolol 0.5% Solution 1 Drop(s) Left EYE daily  vancomycin  IVPB 1000 milliGRAM(s) IV Intermittent every 12 hours    MEDICATIONS  (PRN):  acetaminophen   Tablet .. 650 milliGRAM(s) Oral every 6 hours PRN Temp greater or equal to 38C (100.4F), Moderate Pain (4 - 6)  haloperidol    Concentrate 0.5 milliGRAM(s) Oral every 12 hours PRN Agitation      PHYSICAL EXAM:  Vital Signs Last 24 Hrs  T(C): 36.5 (01 Apr 2019 04:38), Max: 36.6 (31 Mar 2019 14:30)  T(F): 97.7 (01 Apr 2019 04:38), Max: 97.9 (31 Mar 2019 14:30)  HR: 70 (01 Apr 2019 04:38) (59 - 70)  BP: 123/77 (01 Apr 2019 04:38) (123/77 - 146/79)  BP(mean): --  RR: 18 (01 Apr 2019 04:38) (18 - 18)  SpO2: 92% (01 Apr 2019 04:38) (90% - 94%)  I&O's Summary    31 Mar 2019 07:01  -  01 Apr 2019 07:00  --------------------------------------------------------  IN: 1610 mL / OUT: 0 mL / NET: 1610 mL      GENERAL: NAD, well-developed  HEAD:  Atraumatic, Normocephalic  EYES: EOMI, PERRLA, conjunctiva and sclera clear  NECK: Supple, No JVD  CHEST/LUNG: Clear to auscultation bilaterally; No wheeze  HEART: Regular rate and rhythm; No murmurs, rubs, or gallops  ABDOMEN: Soft, Nontender, Nondistended; Bowel sounds present  EXTREMITIES:  2+ Peripheral Pulses, No clubbing, cyanosis, or edema  PSYCH: AAOx3  NEUROLOGY: non-focal  SKIN: No rashes or lesions    LABS:  CAPILLARY BLOOD GLUCOSE                              13.7   11.08 )-----------( 259      ( 01 Apr 2019 09:35 )             42.6     04-01    142  |  103  |  10  ----------------------------<  106<H>  3.5   |  25  |  0.58    Ca    8.9      01 Apr 2019 06:15                RADIOLOGY & ADDITIONAL TESTS:    Imaging Personally Reviewed:    Consultant(s) Notes Reviewed:      Care Discussed with Consultants/Other Providers:

## 2019-04-01 NOTE — PROGRESS NOTE ADULT - SUBJECTIVE AND OBJECTIVE BOX
Infectious Diseases progress note:    Subjective: Awake, alert.  No resp distress.  Still with cough.  Afebrile.  WBC 11.  Both mrsa/mssa pcr (+)    ROS:  CONSTITUTIONAL:  "I feel awful"  CARDIOVASCULAR:  No chest pain or palpitations  RESPIRATORY:   No SOB, cough, dyspnea on exertion.  No wheezing  GASTROINTESTINAL:  No abd pain, N/V, diarrhea/constipation  EXTREMITIES:  No swelling or joint pain  GENITOURINARY:  No burning on urination, increased frequency or urgency.  No flank pain  NEUROLOGIC:  No HA, visual disturbances  SKIN: No rashes    Allergies    No Known Allergies    Intolerances        ANTIBIOTICS/RELEVANT:  antimicrobials  piperacillin/tazobactam IVPB. 3.375 Gram(s) IV Intermittent every 8 hours  vancomycin  IVPB 1000 milliGRAM(s) IV Intermittent every 12 hours    immunologic:    OTHER:  acetaminophen   Tablet .. 650 milliGRAM(s) Oral every 6 hours PRN  amLODIPine   Tablet 5 milliGRAM(s) Oral daily  clopidogrel Tablet 75 milliGRAM(s) Oral daily  digoxin     Tablet 0.125 milliGRAM(s) Oral daily  enalapril 2.5 milliGRAM(s) Oral daily  escitalopram 10 milliGRAM(s) Oral daily  guaiFENesin/dextromethorphan  Syrup 10 milliLiter(s) Oral every 4 hours  haloperidol    Concentrate 0.5 milliGRAM(s) Oral every 12 hours PRN  heparin  Injectable 5000 Unit(s) SubCutaneous every 12 hours  latanoprost 0.005% Ophthalmic Solution 1 Drop(s) Both EYES at bedtime  metoprolol tartrate 25 milliGRAM(s) Oral every 12 hours  pantoprazole    Tablet 40 milliGRAM(s) Oral before breakfast  timolol 0.5% Solution 1 Drop(s) Left EYE daily      Objective:  Vital Signs Last 24 Hrs  T(C): 36.5 (01 Apr 2019 04:38), Max: 36.5 (01 Apr 2019 04:38)  T(F): 97.7 (01 Apr 2019 04:38), Max: 97.7 (01 Apr 2019 04:38)  HR: 76 (01 Apr 2019 14:51) (68 - 76)  BP: 143/70 (01 Apr 2019 14:51) (123/77 - 143/70)  BP(mean): --  RR: 18 (01 Apr 2019 04:38) (18 - 18)  SpO2: 92% (01 Apr 2019 14:51) (92% - 94%)    PHYSICAL EXAM:  Constitutional:NAD  Eyes:REINALDO, EOMI  Ear/Nose/Throat: no thrush, mucositis.  Moist mucous membranes	  Neck:no JVD, no lymphadenopathy, supple  Respiratory: coarse bs b/l  Cardiovascular: S1S2 RRR, no murmurs  Gastrointestinal:soft, nontender,  nondistended (+) BS  Extremities:no e/e/c  Skin:  no rashes, open wounds or ulcerations        LABS:                        13.7   11.08 )-----------( 259      ( 01 Apr 2019 09:35 )             42.6     04-01    142  |  103  |  10  ----------------------------<  106<H>  3.5   |  25  |  0.58    Ca    8.9      01 Apr 2019 06:15                  Vancomycin Level, Trough: 11.3 ug/mL (04-01 @ 17:33)      Rapid RVP Result: Detected          MICROBIOLOGY:    Culture - Blood (03.30.19 @ 10:39)    Specimen Source: .Blood Blood    Culture Results:   No growth to date.    Culture - Blood (03.30.19 @ 10:39)    Specimen Source: .Blood Blood    Culture Results:   No growth to date.    MRSA/MSSA PCR (03.31.19 @ 22:48)    MRSA PCR Result.: Detected: MRSA/MSSA PCR assay is a qualitative in vitro diagnostic test for the  direct detection and differentiation of methicillin-resistant  Staphylococcus aureus (MRSA) from Staphylococcus aureus (SA). The assay  detects DNA from nasal swabs in patients atrisk for nasal colonization.  It is not intended to diagnose MRSA or SA infections nor guide or monitor  treatment for MRSA/SA infections. A negative result does not preclude  nasal colonization. The assay is FDA-approved and its performance has  been established by [employer], [place] and the [facility], [place], [place].    Staph Aureus PCR Result: Detected          RADIOLOGY & ADDITIONAL STUDIES:    < from: Xray Chest 1 View AP/PA (03.30.19 @ 07:01) >  FINDINGS:   The heart size is not accurately evaluated.  Medial right mid to lower lung and left lower lung opacities. There are   no pleural effusions or pneumothorax.  Degenerative changes of the thoracic spine.    IMPRESSION:   Bilateral medial lung opacities which may represent atelectasis and/or   pneumonia.    < end of copied text >

## 2019-04-03 NOTE — PROGRESS NOTE ADULT - SUBJECTIVE AND OBJECTIVE BOX
Infectious Diseases progress note:    Subjective: pt afebrile, no leukocytosis.  no acute o/n events.  No resp distress, resting in recliner    ROS:  CONSTITUTIONAL:  No fever, chills, rigors  CARDIOVASCULAR:  No chest pain or palpitations  RESPIRATORY:   No SOB, cough, dyspnea on exertion.  No wheezing  GASTROINTESTINAL:  No abd pain, N/V, diarrhea/constipation  EXTREMITIES:  No swelling or joint pain  GENITOURINARY:  No burning on urination, increased frequency or urgency.  No flank pain  NEUROLOGIC:  No HA, visual disturbances  SKIN: No rashes    Allergies    No Known Allergies    Intolerances        ANTIBIOTICS/RELEVANT:  antimicrobials  piperacillin/tazobactam IVPB. 3.375 Gram(s) IV Intermittent every 8 hours  vancomycin  IVPB 1000 milliGRAM(s) IV Intermittent every 12 hours    immunologic:    OTHER:  acetaminophen   Tablet .. 650 milliGRAM(s) Oral every 6 hours PRN  amLODIPine   Tablet 5 milliGRAM(s) Oral daily  clopidogrel Tablet 75 milliGRAM(s) Oral daily  digoxin     Tablet 0.125 milliGRAM(s) Oral daily  docusate sodium 100 milliGRAM(s) Oral three times a day  enalapril 2.5 milliGRAM(s) Oral daily  escitalopram 10 milliGRAM(s) Oral daily  guaiFENesin/dextromethorphan  Syrup 10 milliLiter(s) Oral every 4 hours  haloperidol    Concentrate 0.5 milliGRAM(s) Oral every 12 hours PRN  heparin  Injectable 5000 Unit(s) SubCutaneous every 12 hours  latanoprost 0.005% Ophthalmic Solution 1 Drop(s) Both EYES at bedtime  metoprolol tartrate 25 milliGRAM(s) Oral every 12 hours  mupirocin 2% Ointment 1 Application(s) Topical every 12 hours  pantoprazole    Tablet 40 milliGRAM(s) Oral before breakfast  polyethylene glycol 3350 17 Gram(s) Oral daily  senna 2 Tablet(s) Oral at bedtime  timolol 0.5% Solution 1 Drop(s) Left EYE daily      Objective:  Vital Signs Last 24 Hrs  T(C): 36.8 (03 Apr 2019 04:08), Max: 37 (02 Apr 2019 19:52)  T(F): 98.2 (03 Apr 2019 04:08), Max: 98.6 (02 Apr 2019 19:52)  HR: 66 (03 Apr 2019 04:08) (59 - 84)  BP: 132/76 (03 Apr 2019 04:08) (121/79 - 148/74)  BP(mean): --  RR: 18 (03 Apr 2019 04:08) (18 - 19)  SpO2: 95% (03 Apr 2019 04:08) (92% - 95%)    PHYSICAL EXAM:  Constitutional:NAD  Eyes:REINALDO, EOMI  Ear/Nose/Throat: no thrush, mucositis.  Moist mucous membranes	  Neck:no JVD, no lymphadenopathy, supple  Respiratory: decr BS at bases  Cardiovascular: S1S2 RRR, no murmurs  Gastrointestinal:soft, nontender,  nondistended (+) BS  Extremities:no e/e/c  Skin:  no rashes, open wounds or ulcerations        LABS:                        13.9   9.25  )-----------( 277      ( 02 Apr 2019 09:25 )             42.8                       Vancomycin Level, Trough: 11.3 ug/mL (04-01 @ 17:33)      Rapid RVP Result: Detected          MICROBIOLOGY:    Culture - Blood (03.30.19 @ 10:39)    Specimen Source: .Blood Blood    Culture Results:   No growth to date.    Culture - Blood (03.30.19 @ 10:39)    Specimen Source: .Blood Blood    Culture Results:   No growth to date.    Rapid Respiratory Viral Panel (03.30.19 @ 06:50)    Rapid RVP Result: Detected: The [employer] RVP Rapid uses polymerase chain reaction (PCR) and melt  curve analysis to screen for adenovirus; coronavirus HKU1, NL63, 229E,  OC43; human metapneumovirus (hMPV); human enterovirus/rhinovirus  (Entero/RV); influenza A; influenza A/H1;influenza A/H3; influenza  A/H1-2009; influenza B; parainfluenza viruses 1, 2, 3, 4; respiratory  syncytial virus; Bordetella pertussis; Mycoplasma pneumoniae; and  Chlamydophila pneumoniae.    Entero/Rhinovirus (RapRVP): Detected              RADIOLOGY & ADDITIONAL STUDIES:    < from: Xray Chest 1 View AP/PA (03.30.19 @ 07:01) >  FINDINGS:   The heart size is not accurately evaluated.  Medial right mid to lower lung and left lower lung opacities. There are   no pleural effusions or pneumothorax.  Degenerative changes of the thoracic spine.    IMPRESSION:   Bilateral medial lung opacities which may represent atelectasis and/or   pneumonia.    < end of copied text >

## 2019-04-03 NOTE — PROGRESS NOTE ADULT - SUBJECTIVE AND OBJECTIVE BOX
Patient is a 89y old  Female who presents with a chief complaint of cough and sob (31 Mar 2019 16:27)      SUBJECTIVE / OVERNIGHT EVENTS:  cough better, afebrile. no new symptoms  Review of Systems:   CONSTITUTIONAL: No fever, weight loss, or fatigue  EYES: No eye pain, visual disturbances, or discharge  ENMT:  No difficulty hearing, tinnitus, vertigo; No sinus or throat pain  NECK: No pain or stiffness  BREASTS: No pain, masses, or nipple discharge  RESPIRATORY: No cough, wheezing, chills or hemoptysis; No shortness of breath  CARDIOVASCULAR: No chest pain, palpitations, dizziness, or leg swelling  GASTROINTESTINAL: No abdominal or epigastric pain. No nausea, vomiting, or hematemesis; No diarrhea or constipation. No melena or hematochezia.  GENITOURINARY: No dysuria, frequency, hematuria, or incontinence  NEUROLOGICAL: No headaches, memory loss, loss of strength, numbness, or tremors  SKIN: No itching, burning, rashes, or lesions   LYMPH NODES: No enlarged glands  ENDOCRINE: No heat or cold intolerance; No hair loss  MUSCULOSKELETAL: No joint pain or swelling; No muscle, back, or extremity pain  PSYCHIATRIC: No depression, anxiety, mood swings, or difficulty sleeping  HEME/LYMPH: No easy bruising, or bleeding gums  ALLERY AND IMMUNOLOGIC: No hives or eczema    MEDICATIONS  (STANDING):  amLODIPine   Tablet 5 milliGRAM(s) Oral daily  clopidogrel Tablet 75 milliGRAM(s) Oral daily  digoxin     Tablet 0.125 milliGRAM(s) Oral daily  enalapril 2.5 milliGRAM(s) Oral daily  escitalopram 10 milliGRAM(s) Oral daily  guaiFENesin/dextromethorphan  Syrup 10 milliLiter(s) Oral every 4 hours  heparin  Injectable 5000 Unit(s) SubCutaneous every 12 hours  latanoprost 0.005% Ophthalmic Solution 1 Drop(s) Both EYES at bedtime  metoprolol tartrate 25 milliGRAM(s) Oral every 12 hours  pantoprazole    Tablet 40 milliGRAM(s) Oral before breakfast  piperacillin/tazobactam IVPB. 3.375 Gram(s) IV Intermittent every 8 hours  timolol 0.5% Solution 1 Drop(s) Left EYE daily  vancomycin  IVPB 1000 milliGRAM(s) IV Intermittent every 12 hours    MEDICATIONS  (PRN):  acetaminophen   Tablet .. 650 milliGRAM(s) Oral every 6 hours PRN Temp greater or equal to 38C (100.4F), Moderate Pain (4 - 6)  haloperidol    Concentrate 0.5 milliGRAM(s) Oral every 12 hours PRN Agitation      PHYSICAL EXAM:  Vital Signs Last 24 Hrs  T(C): 36.5 (01 Apr 2019 04:38), Max: 36.6 (31 Mar 2019 14:30)  T(F): 97.7 (01 Apr 2019 04:38), Max: 97.9 (31 Mar 2019 14:30)  HR: 70 (01 Apr 2019 04:38) (59 - 70)  BP: 123/77 (01 Apr 2019 04:38) (123/77 - 146/79)  BP(mean): --  RR: 18 (01 Apr 2019 04:38) (18 - 18)  SpO2: 92% (01 Apr 2019 04:38) (90% - 94%)  I&O's Summary    31 Mar 2019 07:01  -  01 Apr 2019 07:00  --------------------------------------------------------  IN: 1610 mL / OUT: 0 mL / NET: 1610 mL      GENERAL: NAD, well-developed  HEAD:  Atraumatic, Normocephalic  EYES: EOMI, PERRLA, conjunctiva and sclera clear  NECK: Supple, No JVD  CHEST/LUNG: Clear to auscultation bilaterally; No wheeze  HEART: Regular rate and rhythm; No murmurs, rubs, or gallops  ABDOMEN: Soft, Nontender, Nondistended; Bowel sounds present  EXTREMITIES:  2+ Peripheral Pulses, No clubbing, cyanosis, or edema  PSYCH: AAOx3  NEUROLOGY: non-focal  SKIN: No rashes or lesions    LABS:  CAPILLARY BLOOD GLUCOSE                              13.7   11.08 )-----------( 259      ( 01 Apr 2019 09:35 )             42.6     04-01    142  |  103  |  10  ----------------------------<  106<H>  3.5   |  25  |  0.58    Ca    8.9      01 Apr 2019 06:15                RADIOLOGY & ADDITIONAL TESTS:    Imaging Personally Reviewed:    Consultant(s) Notes Reviewed:      Care Discussed with Consultants/Other Providers:

## 2019-04-03 NOTE — PROGRESS NOTE ADULT - ASSESSMENT
89 YOF PMHx of Dementia, HTN, HLD, CAD, Afib, p/w cough and sob from [facility]. Pt denies having any symptoms and is upset that she was sent here. Pt appears confused and is not coherently answering questions. Per accompanying paperwork pt has a worsening productive cough and has appeared to have laboured breathing. Sent in for evaluation.    In the ED, Chest X Ray reveals mid lobe infiltrate. (30 Mar 2019 18:09)    ER vss.   Afebrile.  WBC 12.9.  Lact 2.2.  RVP (+) entero/rhinovirus.  CXR with b/l medial lung opacities.  Pt on vanco/zosyn for pna.   Pt has HCP/daughter (Mahi Kirkpatrick) who lives in [place]  - pt is DNR/I.  Reported known h/o of aspiration and confirms that comfort feeds were elected on past admissions in setting of likely recurrent aspiration.   HCP declined swallow eval as per chart notes.    ID consult called for further abx managment.        Recommend:    - Pt with viral URI with entero/rhinovirus.  Cont supportive care, no role for antivirals.    - Pt also with dysphagia, and is on comfort feeds as per chart notes and d/w HCP.  Pt with reported cough with thin liquids.  HCP refusing swallow evaluation, and understands risk for aspiration.  Agree with coverage for possible superimposed bacterial pna/aspiration.  Cont vanco/zosyn.  Also cover for HCAP.   Complete 7 day course through 4/6.      - Both MRSA/MSSA pcr (+).  Will cont vanco on empiric basis.  Recommend mupirocin for decolonization    - f/u wbc, temp curve.     - blood cx x 2 ngtd    - check vanco trough, maintain between 10-15      PT eval      Will follow,    Bernice Murphy  [phone]

## 2019-04-05 NOTE — DISCHARGE NOTE PROVIDER - PROVIDER TOKENS
FREE:[LAST:[Primary care doctor],PHONE:[(   )    -],FAX:[(   )    -]] FREE:[LAST:[Primary care doctor],PHONE:[(   )    -],FAX:[(   )    -]],PROVIDER:[TOKEN:[[id number]]]

## 2019-04-05 NOTE — PROGRESS NOTE ADULT - ASSESSMENT
89 YOF PMHx of Dementia, HTN, HLD, CAD, Afib, p/w cough and sob from [facility]. Pt denies having any symptoms and is upset that she was sent here. Pt appears confused and is not coherently answering questions. Per accompanying paperwork pt has a worsening productive cough and has appeared to have laboured breathing. Sent in for evaluation.    In the ED, Chest X Ray reveals mid lobe infiltrate. (30 Mar 2019 18:09)    ER vss.   Afebrile.  WBC 12.9.  Lact 2.2.  RVP (+) entero/rhinovirus.  CXR with b/l medial lung opacities.  Pt on vanco/zosyn for pna.   Pt has HCP/daughter (Mahi Kirkpatrick) who lives in [place]  - pt is DNR/I.  Reported known h/o of aspiration and confirms that comfort feeds were elected on past admissions in setting of likely recurrent aspiration.   HCP declined swallow eval as per chart notes.    ID consult called for further abx managment.        Recommend:    - Pt with viral URI with entero/rhinovirus.  Cont supportive care, no role for antivirals.    - Pt also with dysphagia, and is on comfort feeds as per chart notes and d/w HCP.  Pt with reported cough with thin liquids.  HCP refusing swallow evaluation, and understands risk for aspiration.  Agree with coverage for possible superimposed bacterial pna/aspiration.  Cont vanco/zosyn.  Also cover for HCAP.   Complete 7 day course through 4/6.      - Both MRSA/MSSA pcr (+).  Will cont vanco on empiric basis.  Recommend mupirocin for decolonization    - f/u wbc, temp curve.     - blood cx x 2 ngtd    - check vanco trough, maintain between 10-15      * d/c abx tomorrow.      Will follow,    Bernice Murphy  [phone]

## 2019-04-05 NOTE — DISCHARGE NOTE PROVIDER - NSDCCPCAREPLAN_GEN_ALL_CORE_FT
PRINCIPAL DISCHARGE DIAGNOSIS  Diagnosis: Pneumonia  Assessment and Plan of Treatment: Completed course of antibiotic therapy for possible aspiration pneumonia.      SECONDARY DISCHARGE DIAGNOSES  Diagnosis: MRSA infection  Assessment and Plan of Treatment: Completed course of vancomycin    Diagnosis: Enterovirus infection  Assessment and Plan of Treatment: Supportive care    Diagnosis: Atrial fibrillation, unspecified type  Assessment and Plan of Treatment: Continue with Metoprolol PRINCIPAL DISCHARGE DIAGNOSIS  Diagnosis: Pneumonia  Assessment and Plan of Treatment: Completed course of antibiotic therapy for possible aspiration pneumonia.  oxygen supplement 2 liter per minute via nasal cannula for hypoxia secondary to atelectasis  incentive spirometry      SECONDARY DISCHARGE DIAGNOSES  Diagnosis: MRSA infection  Assessment and Plan of Treatment: Completed course of vancomycin    Diagnosis: Enterovirus infection  Assessment and Plan of Treatment: Supportive care    Diagnosis: Atrial fibrillation, unspecified type  Assessment and Plan of Treatment: Continue with Metoprolol PRINCIPAL DISCHARGE DIAGNOSIS  Diagnosis: Pneumonia  Assessment and Plan of Treatment: Completed course of antibiotic therapy for possible aspiration pneumonia.  oxygen supplement 2 liter per minute via nasal cannula for hypoxia secondary to atelectasis  incentive spirometry      SECONDARY DISCHARGE DIAGNOSES  Diagnosis: MRSA infection  Assessment and Plan of Treatment: Completed course of vancomycin    Diagnosis: Enterovirus infection  Assessment and Plan of Treatment: Supportive care    Diagnosis: Dementia with behavioral disturbance, unspecified dementia type  Assessment and Plan of Treatment: Dementia with behavioral disturbance, unspecified dementia type  assistance in activities  fall precaution    Diagnosis: Dysphagia, unspecified type  Assessment and Plan of Treatment: Dysphagia, unspecified type  dysphagia diet as prescribed  aspiration precaution while feeding  assistance in feeding  follow up with dteam at the facility    Diagnosis: Atrial fibrillation, unspecified type  Assessment and Plan of Treatment: Continue with Metoprolol

## 2019-04-05 NOTE — DISCHARGE NOTE PROVIDER - INSTRUCTIONS
Dysphagia 1 pureed- honey consistency fluid  low salt Dysphagia 1 pureed- honey consistency fluid  low salt  Aspiration precaution Dysphagia 1 pureed- honey consistency fluid  low salt  Aspiration precaution  spoon feed/assistance in feeding

## 2019-04-05 NOTE — DISCHARGE NOTE PROVIDER - HOSPITAL COURSE
89 YOF PMHx of Dementia, HTN, HLD, CAD, Afib, p/w cough and sob from [facility]. Pt denies having any symptoms and is upset that she was sent here. Pt appears confused and is not coherently answering questions. Per accompanying paperwork pt has a worsening productive cough and has appeared to have laboured breathing. Sent in for evaluation.        In the ED, Chest X Ray reveals mid lobe infiltrate. (30 Mar 2019 18:09)        ID consult called for further abx managment.      Recommend:        - Pt with viral URI with entero/rhinovirus.  Cont supportive care, no role for antivirals.        - Pt also with dysphagia, and is on comfort feeds as per chart notes and d/w HCP.  Pt with reported cough with thin liquids.  HCP refusing swallow evaluation, and understands risk for aspiration.  Agree with coverage for possible superimposed bacterial pna/aspiration.  - Both MRSA/MSSA pcr (+).  completed vanco and zosyn.  Also cover for HCAP.   Completed7 day course through 4/6.  Received mupirocin for decolonization                3/30: entero/rhino virus    3/31: staph aureus(pcr)    3/30: bc neg 89 YOF PMHx of Dementia, HTN, HLD, CAD, Afib, p/w cough and sob from [facility]. Pt denies having any symptoms and is upset that she was sent here. Pt appears confused and is not coherently answering questions. Per accompanying paperwork pt has a worsening productive cough and has appeared to have laboured breathing. Sent in for evaluation.        In the ED, Chest X Ray reveals mid lobe infiltrate. (30 Mar 2019 18:09)        ID consult called for further abx managment.      Per ID:    - Pt with viral URI with entero/rhinovirus.  Cont supportive care, no role for antivirals.     - Both MRSA/MSSA pcr (+).  completed 7day course of vanco and zosyn.  Agree with coverage for possible superimposed bacterial pna/aspiration.  Received mupirocin for decolonization    Pt also with dysphagia, and is on comfort feeds as per chart notes and d/w HCP.  Pt with reported cough with thin liquids.  HCP refusing swallow evaluation, and understands risk for aspiration.          3/30: entero/rhino virus    3/31: staph aureus(pcr)    3/30: bc neg 89 YOF PMHx of Dementia, HTN, HLD, CAD, Afib, p/w cough and sob from [facility]. Pt denies having any symptoms and is upset that she was sent here. Pt appears confused and is not coherently answering questions. Per accompanying paperwork pt has a worsening productive cough and has appeared to have laboured breathing. Sent in for evaluation.        In the ED, Chest X Ray reveals mid lobe infiltrate. (30 Mar 2019 18:09)        ID consult called for further abx managment.      Per ID:    - Pt with viral URI with entero/rhinovirus.  Cont supportive care, no role for antivirals.     - Both MRSA/MSSA pcr (+).  completed 7day course of vanco and zosyn.  Agree with coverage for possible superimposed bacterial pna/aspiration.  Received mupirocin for decolonization    Pt also with dysphagia, and is on comfort feeds as per chart notes and d/w HCP.  Pt with reported cough with thin liquids.  HCP refusing swallow evaluation, and understands risk for aspiration.          3/30: entero/rhino virus    3/31: staph aureus(pcr)    3/30: blood culture- neg    requiring oxygen supplement 2 liters per minute via nasal cannula to keep pulse ox above 90%.    cleared by MD for discharge to assisted living/home care 89 YOF PMHx of Dementia, HTN, HLD, CAD, Afib, p/w cough and sob from [facility]. Pt denies having any symptoms and is upset that she was sent here. Pt appears confused and is not coherently answering questions. Per accompanying paperwork pt has a worsening productive cough and has appeared to have laboured breathing. Sent in for evaluation.        In the ED, Chest X Ray reveals mid lobe infiltrate. (30 Mar 2019 18:09)        ID consult called for further abx managment.      Per ID:    - Pt with viral URI with entero/rhinovirus.  Cont supportive care, no role for antivirals.     - Both MRSA/MSSA pcr (+).  completed 7day course of vanco and zosyn.  Agree with coverage for possible superimposed bacterial pna/aspiration.  Received mupirocin for decolonization    Pt also with dysphagia, and is on comfort feeds as per chart notes and d/w HCP.  Pt with reported cough with thin liquids.  HCP refusing swallow evaluation, and understands risk for aspiration.          3/30: entero/rhino virus    3/31: staph aureus(pcr)    3/30: blood culture- neg    requiring oxygen supplement 2 liters per minute via nasal cannula to keep pulse ox above 90%.    cleared by MD for discharge to assisted living/home care on 4/6/19;    facility was unable to take pt secondary to dysphagia' had dysphagia eval/MBS on 4/10/19- advised dysphagia 1 with nectar thick liquid with aspiration precaution;    pending further discussion/palliative consult goals of care 89 YOF PMHx of Dementia, HTN, HLD, CAD, Afib, p/w cough and sob from [facility]. Pt denies having any symptoms and is upset that she was sent here. Pt appears confused and is not coherently answering questions. Per accompanying paperwork pt has a worsening productive cough and has appeared to have laboured breathing. Sent in for evaluation.        In the ED, Chest X Ray reveals mid lobe infiltrate. (30 Mar 2019 18:09)        ID consult called for further abx managment.      Per ID:    - Pt with viral URI with entero/rhinovirus.  Cont supportive care, no role for antivirals.     - Both MRSA/MSSA pcr (+).  completed 7day course of vanco and zosyn.  Agree with coverage for possible superimposed bacterial pna/aspiration.  Received mupirocin for decolonization    Pt also with dysphagia, and is on comfort feeds as per pt family wish.        3/30: entero/rhino virus    3/31: staph aureus(pcr)    3/30: blood culture- neg    requiring oxygen supplement 2 liters per minute via nasal cannula to keep pulse ox above 90%.    cleared by MD for discharge to assisted living/home care on 4/6/19;    facility was unable to take pt secondary to dysphagia'.    had dysphagia eval/MBS on 4/10/19- advised dysphagia 1 with nectar thick liquid with aspiration precaution;    seen by palliative care team. goals of care; DNR status;    per patient family discussion subacute to long term care 89 YOF PMHx of Dementia, HTN, HLD, CAD, Afib, p/w cough and sob from [facility]. Pt denies having any symptoms and is upset that she was sent here. Pt appears confused and is not coherently answering questions. Per accompanying paperwork pt has a worsening productive cough and has appeared to have laboured breathing. Sent in for evaluation.        In the ED, Chest X Ray reveals mid lobe infiltrate. (30 Mar 2019 18:09)        ID consult called for further abx managment.      Per ID:    - Pt with viral URI with entero/rhinovirus.  Cont supportive care, no role for antivirals.     - Both MRSA/MSSA pcr (+).  completed 7day course of vanco and zosyn.  Agree with coverage for possible superimposed bacterial pna/aspiration.  Received mupirocin for decolonization    Pt also with dysphagia, and is on comfort feeds as per pt family wish.        3/30: entero/rhino virus    3/31: staph aureus(pcr)    3/30: blood culture- neg    requiring oxygen supplement 2 liters per minute via nasal cannula to keep pulse ox above 90%.    cleared by MD for discharge to assisted living/home care on 4/6/19;    facility was unable to take pt secondary to dysphagia'.    had dysphagia eval/MBS on 4/10/19- advised dysphagia 1 with nectar thick liquid with aspiration precaution;    seen by palliative care team. goals of care; DNR status;    per patient family discussion subacute to long term care;    cleared by MD for discharge.

## 2019-04-05 NOTE — PROGRESS NOTE ADULT - SUBJECTIVE AND OBJECTIVE BOX
Patient is a 89y old  Female who presents with a chief complaint of cough and sob (31 Mar 2019 16:27)      SUBJECTIVE / OVERNIGHT EVENTS:  lethargic but easily arousable. Remains on antibiotics for one more day.?    Review of Systems:   CONSTITUTIONAL: No fever, weight loss, or fatigue  EYES: No eye pain, visual disturbances, or discharge  ENMT:  No difficulty hearing, tinnitus, vertigo; No sinus or throat pain  NECK: No pain or stiffness  BREASTS: No pain, masses, or nipple discharge  RESPIRATORY: No cough, wheezing, chills or hemoptysis; No shortness of breath  CARDIOVASCULAR: No chest pain, palpitations, dizziness, or leg swelling  GASTROINTESTINAL: No abdominal or epigastric pain. No nausea, vomiting, or hematemesis; No diarrhea or constipation. No melena or hematochezia.  GENITOURINARY: No dysuria, frequency, hematuria, or incontinence  NEUROLOGICAL: No headaches, memory loss, loss of strength, numbness, or tremors  SKIN: No itching, burning, rashes, or lesions   LYMPH NODES: No enlarged glands  ENDOCRINE: No heat or cold intolerance; No hair loss  MUSCULOSKELETAL: No joint pain or swelling; No muscle, back, or extremity pain  PSYCHIATRIC: No depression, anxiety, mood swings, or difficulty sleeping  HEME/LYMPH: No easy bruising, or bleeding gums  ALLERY AND IMMUNOLOGIC: No hives or eczema    MEDICATIONS  (STANDING):  amLODIPine   Tablet 5 milliGRAM(s) Oral daily  clopidogrel Tablet 75 milliGRAM(s) Oral daily  digoxin     Tablet 0.125 milliGRAM(s) Oral daily  enalapril 2.5 milliGRAM(s) Oral daily  escitalopram 10 milliGRAM(s) Oral daily  guaiFENesin/dextromethorphan  Syrup 10 milliLiter(s) Oral every 4 hours  heparin  Injectable 5000 Unit(s) SubCutaneous every 12 hours  latanoprost 0.005% Ophthalmic Solution 1 Drop(s) Both EYES at bedtime  metoprolol tartrate 25 milliGRAM(s) Oral every 12 hours  pantoprazole    Tablet 40 milliGRAM(s) Oral before breakfast  piperacillin/tazobactam IVPB. 3.375 Gram(s) IV Intermittent every 8 hours  timolol 0.5% Solution 1 Drop(s) Left EYE daily  vancomycin  IVPB 1000 milliGRAM(s) IV Intermittent every 12 hours    MEDICATIONS  (PRN):  acetaminophen   Tablet .. 650 milliGRAM(s) Oral every 6 hours PRN Temp greater or equal to 38C (100.4F), Moderate Pain (4 - 6)  haloperidol    Concentrate 0.5 milliGRAM(s) Oral every 12 hours PRN Agitation      PHYSICAL EXAM:  Vital Signs Last 24 Hrs  T(C): 36.5 (01 Apr 2019 04:38), Max: 36.6 (31 Mar 2019 14:30)  T(F): 97.7 (01 Apr 2019 04:38), Max: 97.9 (31 Mar 2019 14:30)  HR: 70 (01 Apr 2019 04:38) (59 - 70)  BP: 123/77 (01 Apr 2019 04:38) (123/77 - 146/79)  BP(mean): --  RR: 18 (01 Apr 2019 04:38) (18 - 18)  SpO2: 92% (01 Apr 2019 04:38) (90% - 94%)  I&O's Summary    31 Mar 2019 07:01  -  01 Apr 2019 07:00  --------------------------------------------------------  IN: 1610 mL / OUT: 0 mL / NET: 1610 mL      GENERAL: NAD, well-developed  HEAD:  Atraumatic, Normocephalic  EYES: EOMI, PERRLA, conjunctiva and sclera clear  NECK: Supple, No JVD  CHEST/LUNG: Clear to auscultation bilaterally; No wheeze  HEART: Regular rate and rhythm; No murmurs, rubs, or gallops  ABDOMEN: Soft, Nontender, Nondistended; Bowel sounds present  EXTREMITIES:  2+ Peripheral Pulses, No clubbing, cyanosis, or edema  PSYCH: AAOx3  NEUROLOGY: non-focal  SKIN: No rashes or lesions    LABS:  CAPILLARY BLOOD GLUCOSE                              13.7   11.08 )-----------( 259      ( 01 Apr 2019 09:35 )             42.6     04-01    142  |  103  |  10  ----------------------------<  106<H>  3.5   |  25  |  0.58    Ca    8.9      01 Apr 2019 06:15                RADIOLOGY & ADDITIONAL TESTS:    Imaging Personally Reviewed:    Consultant(s) Notes Reviewed:      Care Discussed with Consultants/Other Providers:

## 2019-04-05 NOTE — DISCHARGE NOTE PROVIDER - CARE PROVIDER_API CALL
Primary care doctor,   Phone: (   )    -  Fax: (   )    -  Follow Up Time: Primary care doctor,   Phone: (   )    -  Fax: (   )    -  Follow Up Time:     Jennifer Brambila (MD)  Internal Medicine  [address] [place] [address]  Phone: [phone]  Fax: [phone]  Follow Up Time:

## 2019-04-05 NOTE — PROVIDER CONTACT NOTE (OTHER) - REASON
blood pressure electronic 172/81, manual 174/82. o2 sat 89% room air. 93% on 3 L o2. pt has been on nasal cannula o2-see vitals flowsheet.

## 2019-04-05 NOTE — PROGRESS NOTE ADULT - SUBJECTIVE AND OBJECTIVE BOX
Infectious Diseases progress note:    Subjective:  No acute o/n events.  NAD.  Afebrile.  Pt completing vanco/zosyn    ROS:  CONSTITUTIONAL:  No fever, chills, rigors  CARDIOVASCULAR:  No chest pain or palpitations  RESPIRATORY:   No SOB, cough, dyspnea on exertion.  No wheezing  GASTROINTESTINAL:  No abd pain, N/V, diarrhea/constipation  EXTREMITIES:  No swelling or joint pain  GENITOURINARY:  No burning on urination, increased frequency or urgency.  No flank pain  NEUROLOGIC:  No HA, visual disturbances  SKIN: No rashes    Allergies    No Known Allergies    Intolerances        ANTIBIOTICS/RELEVANT:  antimicrobials  piperacillin/tazobactam IVPB. 3.375 Gram(s) IV Intermittent every 8 hours  vancomycin  IVPB 1000 milliGRAM(s) IV Intermittent every 12 hours    immunologic:    OTHER:  acetaminophen   Tablet .. 650 milliGRAM(s) Oral every 6 hours PRN  amLODIPine   Tablet 5 milliGRAM(s) Oral daily  clopidogrel Tablet 75 milliGRAM(s) Oral daily  digoxin     Tablet 0.125 milliGRAM(s) Oral daily  docusate sodium 100 milliGRAM(s) Oral three times a day  enalapril 2.5 milliGRAM(s) Oral daily  escitalopram 10 milliGRAM(s) Oral daily  guaiFENesin/dextromethorphan  Syrup 10 milliLiter(s) Oral every 4 hours  haloperidol    Concentrate 0.5 milliGRAM(s) Oral every 12 hours PRN  heparin  Injectable 5000 Unit(s) SubCutaneous every 12 hours  latanoprost 0.005% Ophthalmic Solution 1 Drop(s) Both EYES at bedtime  metoprolol tartrate 25 milliGRAM(s) Oral every 12 hours  mupirocin 2% Ointment 1 Application(s) Topical every 12 hours  pantoprazole    Tablet 40 milliGRAM(s) Oral before breakfast  polyethylene glycol 3350 17 Gram(s) Oral daily  senna 2 Tablet(s) Oral at bedtime  timolol 0.5% Solution 1 Drop(s) Left EYE daily      Objective:  Vital Signs Last 24 Hrs  T(C): 30.8 (05 Apr 2019 11:53), Max: 36.6 (05 Apr 2019 04:48)  T(F): 87.4 (05 Apr 2019 11:53), Max: 97.8 (05 Apr 2019 04:48)  HR: 65 (05 Apr 2019 11:53) (65 - 74)  BP: 100/64 (05 Apr 2019 11:53) (100/64 - 134/71)  BP(mean): --  RR: 18 (05 Apr 2019 11:53) (18 - 18)  SpO2: 96% (05 Apr 2019 11:53) (92% - 96%)    PHYSICAL EXAM:  Constitutional:NAD  Eyes:REINALDO, EOMI  Ear/Nose/Throat: no thrush, mucositis.  Moist mucous membranes	  Neck:no JVD, no lymphadenopathy, supple  Respiratory: CTA tammie  Cardiovascular: S1S2 RRR, no murmurs  Gastrointestinal:soft, nontender,  nondistended (+) BS  Extremities:no e/e/c  Skin:  no rashes, open wounds or ulcerations        LABS:                        15.6   10.57 )-----------( 301      ( 04 Apr 2019 09:26 )             49.5     04-04    141  |  104  |  12  ----------------------------<  64<L>  4.1   |  21<L>  |  0.76    Ca    9.0      04 Apr 2019 07:24                  Vancomycin Level, Trough: 15.8 ug/mL (04-03 @ 17:55)  Vancomycin Level, Trough: 11.3 ug/mL (04-01 @ 17:33)      Rapid RVP Result: Detected          MICROBIOLOGY:    Culture - Blood (03.30.19 @ 10:39)    Specimen Source: .Blood Blood    Culture Results:   No growth at 5 days.    Culture - Blood (03.30.19 @ 10:39)    Specimen Source: .Blood Blood    Culture Results:   No growth at 5 days.    Culture - Urine (02.26.19 @ 00:15)    -  Amikacin: S <=16    -  Ampicillin: S <=8 These ampicillin results predict results for amoxicillin    -  Ampicillin/Sulbactam: S <=8/4    -  Aztreonam: S <=4    -  Gentamicin: S <=4    -  Imipenem: S <=1    -  Levofloxacin: R >4    -  Meropenem: S <=1    -  Nitrofurantoin: S <=32 Should not be used to treat pyelonephritis    -  Piperacillin/Tazobactam: S <=16    -  Tigecycline: S <=2    -  Tobramycin: S <=4    -  Trimethoprim/Sulfamethoxazole: S <=2/38    -  Cefazolin: S <=8 For uncomplicated UTI with K. pneumoniae, E. coli, or P. mirablis: BEVERLY <=16 is sensitive and BEVERLY >=32 is resistant. This also predicts results for oral agents cefaclor, cefdinir, cefpodoxime, cefprozil, cefuroxime axetil, cephalexin and locarbef for uncomplicated UTI. Note that some isolates may be susceptible to these agents while testing resistant to cefazolin.    -  Cefepime: S <=4    -  Cefoxitin: S <=8    -  Ceftriaxone: S <=1 Enterobacter, Citrobacter, and Serratia may develop resistance during prolonged therapy    -  Ciprofloxacin: R >2    -  Ertapenem: S <=1    Specimen Source: .Urine Catheterized    Culture Results:   >100,000 CFU/ml Escherichia coli    Organism Identification: Escherichia coli    Organism: Escherichia coli    Method Type: BEVERLY          RADIOLOGY & ADDITIONAL STUDIES:    < from: Xray Chest 1 View AP/PA (03.30.19 @ 07:01) >  FINDINGS:   The heart size is not accurately evaluated.  Medial right mid to lower lung and left lower lung opacities. There are   no pleural effusions or pneumothorax.  Degenerative changes of the thoracic spine.    IMPRESSION:   Bilateral medial lung opacities which may represent atelectasis and/or   pneumonia.    < end of copied text >

## 2019-04-06 NOTE — PROVIDER CONTACT NOTE (OTHER) - ASSESSMENT
pt A&Ox2 denies pain, denies headache, denies shortness of breath. no signs of distress noted. pt resting comfortably in bed with safety measures in place.
pt A&Ox2 denies pain, denies headache, denies shortness of breath. no signs of distress noted. pt resting comfortably in bed. safety measures in place. new iv site WDL.

## 2019-04-06 NOTE — PROVIDER CONTACT NOTE (OTHER) - ACTION/TREATMENT ORDERED:
PA notified. No new orders. will continue to monitor.
PA notified. as per PA, recheck blood pressure in approximately 2 hours. PA to put in order for supplemental o2.

## 2019-04-06 NOTE — CHART NOTE - NSCHARTNOTEFT_GEN_A_CORE
89 YOF PMHx of Dementia, HTN, HLD, CAD, Afib, p/w cough and sob from [facility].     - Pt with viral URI with entero/rhinovirus.  - Pt completed treatment for pneumonia  Patients Sa02 is 87% at rest. Pt is currently on 2Liter Nasal Cannula and requires home oxygen

## 2019-04-06 NOTE — PROGRESS NOTE ADULT - SUBJECTIVE AND OBJECTIVE BOX
Patient is a 89y old  Female who presents with a chief complaint of cough and sob (31 Mar 2019 16:27)      SUBJECTIVE / OVERNIGHT EVENTS:  No new symptoms, afebrile, completed the course of antibiotics.     Review of Systems:   CONSTITUTIONAL: No fever, weight loss, or fatigue  EYES: No eye pain, visual disturbances, or discharge  ENMT:  No difficulty hearing, tinnitus, vertigo; No sinus or throat pain  NECK: No pain or stiffness  BREASTS: No pain, masses, or nipple discharge  RESPIRATORY: No cough, wheezing, chills or hemoptysis; No shortness of breath  CARDIOVASCULAR: No chest pain, palpitations, dizziness, or leg swelling  GASTROINTESTINAL: No abdominal or epigastric pain. No nausea, vomiting, or hematemesis; No diarrhea or constipation. No melena or hematochezia.  GENITOURINARY: No dysuria, frequency, hematuria, or incontinence  NEUROLOGICAL: No headaches, memory loss, loss of strength, numbness, or tremors  SKIN: No itching, burning, rashes, or lesions   LYMPH NODES: No enlarged glands  ENDOCRINE: No heat or cold intolerance; No hair loss  MUSCULOSKELETAL: No joint pain or swelling; No muscle, back, or extremity pain  PSYCHIATRIC: No depression, anxiety, mood swings, or difficulty sleeping  HEME/LYMPH: No easy bruising, or bleeding gums  ALLERY AND IMMUNOLOGIC: No hives or eczema    MEDICATIONS  (STANDING):  amLODIPine   Tablet 5 milliGRAM(s) Oral daily  clopidogrel Tablet 75 milliGRAM(s) Oral daily  digoxin     Tablet 0.125 milliGRAM(s) Oral daily  enalapril 2.5 milliGRAM(s) Oral daily  escitalopram 10 milliGRAM(s) Oral daily  guaiFENesin/dextromethorphan  Syrup 10 milliLiter(s) Oral every 4 hours  heparin  Injectable 5000 Unit(s) SubCutaneous every 12 hours  latanoprost 0.005% Ophthalmic Solution 1 Drop(s) Both EYES at bedtime  metoprolol tartrate 25 milliGRAM(s) Oral every 12 hours  pantoprazole    Tablet 40 milliGRAM(s) Oral before breakfast  piperacillin/tazobactam IVPB. 3.375 Gram(s) IV Intermittent every 8 hours  timolol 0.5% Solution 1 Drop(s) Left EYE daily  vancomycin  IVPB 1000 milliGRAM(s) IV Intermittent every 12 hours    MEDICATIONS  (PRN):  acetaminophen   Tablet .. 650 milliGRAM(s) Oral every 6 hours PRN Temp greater or equal to 38C (100.4F), Moderate Pain (4 - 6)  haloperidol    Concentrate 0.5 milliGRAM(s) Oral every 12 hours PRN Agitation      PHYSICAL EXAM:  Vital Signs Last 24 Hrs  T(C): 36.5 (01 Apr 2019 04:38), Max: 36.6 (31 Mar 2019 14:30)  T(F): 97.7 (01 Apr 2019 04:38), Max: 97.9 (31 Mar 2019 14:30)  HR: 70 (01 Apr 2019 04:38) (59 - 70)  BP: 123/77 (01 Apr 2019 04:38) (123/77 - 146/79)  BP(mean): --  RR: 18 (01 Apr 2019 04:38) (18 - 18)  SpO2: 92% (01 Apr 2019 04:38) (90% - 94%)  I&O's Summary    31 Mar 2019 07:01  -  01 Apr 2019 07:00  --------------------------------------------------------  IN: 1610 mL / OUT: 0 mL / NET: 1610 mL      GENERAL: NAD, well-developed  HEAD:  Atraumatic, Normocephalic  EYES: EOMI, PERRLA, conjunctiva and sclera clear  NECK: Supple, No JVD  CHEST/LUNG: Clear to auscultation bilaterally; No wheeze  HEART: Regular rate and rhythm; No murmurs, rubs, or gallops  ABDOMEN: Soft, Nontender, Nondistended; Bowel sounds present  EXTREMITIES:  2+ Peripheral Pulses, No clubbing, cyanosis, or edema  PSYCH: AAOx3  NEUROLOGY: non-focal  SKIN: No rashes or lesions    LABS:  CAPILLARY BLOOD GLUCOSE                              13.7   11.08 )-----------( 259      ( 01 Apr 2019 09:35 )             42.6     04-01    142  |  103  |  10  ----------------------------<  106<H>  3.5   |  25  |  0.58    Ca    8.9      01 Apr 2019 06:15                RADIOLOGY & ADDITIONAL TESTS:    Imaging Personally Reviewed:    Consultant(s) Notes Reviewed:      Care Discussed with Consultants/Other Providers:

## 2019-04-07 NOTE — PROVIDER CONTACT NOTE (CRITICAL VALUE NOTIFICATION) - ACTION/TREATMENT ORDERED:
NP Caron Pressley aware that K 2.7. Potassium supplements ordered and to be given to pt, will recheck K level upon  completion of  of K supplements. Will continue to monitor pt

## 2019-04-07 NOTE — PROGRESS NOTE ADULT - ASSESSMENT
89 YOF PMHx of Dementia, HTN, HLD, CAD, Afib, p/w cough and sob from [facility]. Pt denies having any symptoms and is upset that she was sent here. Pt appears confused and is not coherently answering questions. Per accompanying paperwork pt has a worsening productive cough and has appeared to have laboured breathing. Sent in for evaluation.    In the ED, Chest X Ray reveals mid lobe infiltrate. (30 Mar 2019 18:09)    ER vss.   Afebrile.  WBC 12.9.  Lact 2.2.  RVP (+) entero/rhinovirus.  CXR with b/l medial lung opacities.  Pt on vanco/zosyn for pna.   Pt has HCP/daughter (Mahi Kirkpatrick) who lives in [place]  - pt is DNR/I.  Reported known h/o of aspiration and confirms that comfort feeds were elected on past admissions in setting of likely recurrent aspiration.   HCP declined swallow eval as per chart notes.    ID consult called for further abx managment.        Recommend:    - Pt with viral URI with entero/rhinovirus.  Cont supportive care, no role for antivirals.    - Pt also with dysphagia, and is on comfort feeds as per chart notes and d/w HCP.  Pt with reported cough with thin liquids.  HCP refusing swallow evaluation, and understands risk for aspiration.  Agree with coverage for possible superimposed bacterial pna/aspiration.  Pt completed vanco/zosyn through 4/6.      - Both MRSA/MSSA pcr (+).  Will cont vanco on empiric basis.  Recommend mupirocin for decolonization    - f/u wbc, temp curve.     - blood cx x 2 ngtd          * Cont to monitor off abx.  d/c planning as per primary team      Will follow,    Bernice Murphy  [phone]

## 2019-04-07 NOTE — PROGRESS NOTE ADULT - SUBJECTIVE AND OBJECTIVE BOX
Infectious Diseases progress note:    Subjective: Resting in recliner, NAD, no acute o/n events.  afebrile.  Completed abx    ROS:  CONSTITUTIONAL:  No fever, chills, rigors  CARDIOVASCULAR:  No chest pain or palpitations  RESPIRATORY:   No SOB, cough, dyspnea on exertion.  No wheezing  GASTROINTESTINAL:  No abd pain, N/V, diarrhea/constipation  EXTREMITIES:  No swelling or joint pain  GENITOURINARY:  No burning on urination, increased frequency or urgency.  No flank pain  NEUROLOGIC:  No HA, visual disturbances  SKIN: No rashes    Allergies    No Known Allergies    Intolerances        ANTIBIOTICS/RELEVANT:  antimicrobials    immunologic:    OTHER:  acetaminophen   Tablet .. 650 milliGRAM(s) Oral every 6 hours PRN  amLODIPine   Tablet 5 milliGRAM(s) Oral daily  clopidogrel Tablet 75 milliGRAM(s) Oral daily  digoxin     Tablet 0.125 milliGRAM(s) Oral daily  docusate sodium 100 milliGRAM(s) Oral three times a day  enalapril 2.5 milliGRAM(s) Oral daily  escitalopram 10 milliGRAM(s) Oral daily  guaiFENesin/dextromethorphan  Syrup 10 milliLiter(s) Oral every 4 hours  haloperidol    Concentrate 0.5 milliGRAM(s) Oral every 12 hours PRN  heparin  Injectable 5000 Unit(s) SubCutaneous every 12 hours  latanoprost 0.005% Ophthalmic Solution 1 Drop(s) Both EYES at bedtime  metoprolol tartrate 25 milliGRAM(s) Oral every 12 hours  pantoprazole    Tablet 40 milliGRAM(s) Oral before breakfast  polyethylene glycol 3350 17 Gram(s) Oral daily  potassium chloride    Tablet ER 20 milliEquivalent(s) Oral every 2 hours  potassium chloride  10 mEq/100 mL IVPB 10 milliEquivalent(s) IV Intermittent every 1 hour  senna 2 Tablet(s) Oral at bedtime  timolol 0.5% Solution 1 Drop(s) Left EYE daily      Objective:  Vital Signs Last 24 Hrs  T(C): 36.8 (07 Apr 2019 10:39), Max: 37.1 (06 Apr 2019 19:55)  T(F): 98.2 (07 Apr 2019 10:39), Max: 98.8 (06 Apr 2019 19:55)  HR: 68 (07 Apr 2019 10:39) (68 - 86)  BP: 146/83 (07 Apr 2019 10:39) (126/83 - 146/83)  BP(mean): --  RR: 18 (07 Apr 2019 10:39) (18 - 18)  SpO2: 92% (07 Apr 2019 10:39) (92% - 95%)    PHYSICAL EXAM:  Constitutional:NAD  Eyes:REINALDO, EOMI  Ear/Nose/Throat: no thrush, mucositis.  Moist mucous membranes	  Neck:no JVD, no lymphadenopathy, supple  Respiratory: coarse BS b/l  Cardiovascular: S1S2 RRR, no murmurs  Gastrointestinal:soft, nontender,  nondistended (+) BS  Extremities:no e/e/c  Skin:  no rashes, open wounds or ulcerations        LABS:    04-07    143  |  104  |  11  ----------------------------<  106<H>  2.7<LL>   |  26  |  0.76    Ca    8.5      07 Apr 2019 12:18                  Vancomycin Level, Trough: 16.3 ug/mL (04-06 @ 01:22)  Vancomycin Level, Trough: 15.8 ug/mL (04-03 @ 17:55)      Rapid RVP Result: Detected          MICROBIOLOGY:    Culture - Blood (03.30.19 @ 10:39)    Specimen Source: .Blood Blood    Culture Results:   No growth at 5 days.    Culture - Blood (03.30.19 @ 10:39)    Specimen Source: .Blood Blood    Culture Results:   No growth at 5 days.          RADIOLOGY & ADDITIONAL STUDIES:    < from: Xray Chest 1 View AP/PA (03.30.19 @ 07:01) >  FINDINGS:   The heart size is not accurately evaluated.  Medial right mid to lower lung and left lower lung opacities. There are   no pleural effusions or pneumothorax.  Degenerative changes of the thoracic spine.    IMPRESSION:   Bilateral medial lung opacities which may represent atelectasis and/or   pneumonia.    < end of copied text >

## 2019-04-08 NOTE — PROGRESS NOTE ADULT - ASSESSMENT
89 YOF PMHx of Dementia, HTN, HLD, CAD, Afib, p/w cough and sob from [facility]. Pt denies having any symptoms and is upset that she was sent here. Pt appears confused and is not coherently answering questions. Per accompanying paperwork pt has a worsening productive cough and has appeared to have laboured breathing. Sent in for evaluation.    In the ED, Chest X Ray reveals mid lobe infiltrate. (30 Mar 2019 18:09)    ER vss.   Afebrile.  WBC 12.9.  Lact 2.2.  RVP (+) entero/rhinovirus.  CXR with b/l medial lung opacities.  Pt on vanco/zosyn for pna.   Pt has HCP/daughter (Mahi Kirkpatrick) who lives in [place]  - pt is DNR/I.  Reported known h/o of aspiration and confirms that comfort feeds were elected on past admissions in setting of likely recurrent aspiration.   HCP declined swallow eval as per chart notes.    ID consult called for further abx managment.        Recommend:    - Pt with viral URI with entero/rhinovirus.  Cont supportive care, no role for antivirals.    - Pt also with dysphagia, and is on comfort feeds as per chart notes and d/w HCP.  s/p trt of asp pna, completed on 4/6.            * Cont to monitor off abx.  d/c planning as per primary team.  No acute ID issues      Will follow,    Bernice Murphy  [phone]

## 2019-04-08 NOTE — PROGRESS NOTE ADULT - SUBJECTIVE AND OBJECTIVE BOX
Infectious Diseases progress note:    Subjective:  NAD, sitting in recliner.  Offers no complaints.  Afebrile, no acute o/n events.     ROS:  CONSTITUTIONAL:  No fever, chills, rigors  CARDIOVASCULAR:  No chest pain or palpitations  RESPIRATORY:   No SOB, cough, dyspnea on exertion.  No wheezing  GASTROINTESTINAL:  No abd pain, N/V, diarrhea/constipation  EXTREMITIES:  No swelling or joint pain  GENITOURINARY:  No burning on urination, increased frequency or urgency.  No flank pain  NEUROLOGIC:  No HA, visual disturbances  SKIN: No rashes    Allergies    No Known Allergies    Intolerances        ANTIBIOTICS/RELEVANT:  antimicrobials    immunologic:    OTHER:  acetaminophen   Tablet .. 650 milliGRAM(s) Oral every 6 hours PRN  amLODIPine   Tablet 5 milliGRAM(s) Oral daily  clopidogrel Tablet 75 milliGRAM(s) Oral daily  digoxin     Tablet 0.125 milliGRAM(s) Oral daily  docusate sodium 100 milliGRAM(s) Oral three times a day  enalapril 2.5 milliGRAM(s) Oral daily  escitalopram 10 milliGRAM(s) Oral daily  guaiFENesin/dextromethorphan  Syrup 10 milliLiter(s) Oral every 4 hours  haloperidol    Concentrate 0.5 milliGRAM(s) Oral every 12 hours PRN  heparin  Injectable 5000 Unit(s) SubCutaneous every 12 hours  latanoprost 0.005% Ophthalmic Solution 1 Drop(s) Both EYES at bedtime  metoprolol tartrate 25 milliGRAM(s) Oral every 12 hours  pantoprazole    Tablet 40 milliGRAM(s) Oral before breakfast  polyethylene glycol 3350 17 Gram(s) Oral daily  senna 2 Tablet(s) Oral at bedtime  timolol 0.5% Solution 1 Drop(s) Left EYE daily      Objective:  Vital Signs Last 24 Hrs  T(C): 36.9 (08 Apr 2019 19:40), Max: 37 (08 Apr 2019 04:23)  T(F): 98.4 (08 Apr 2019 19:40), Max: 98.6 (08 Apr 2019 04:23)  HR: 55 (08 Apr 2019 19:40) (55 - 83)  BP: 163/62 (08 Apr 2019 19:40) (144/73 - 163/62)  BP(mean): --  RR: 18 (08 Apr 2019 19:40) (18 - 18)  SpO2: 94% (08 Apr 2019 19:40) (91% - 94%)    PHYSICAL EXAM:  Constitutional:NAD  Eyes:REINALDO, EOMI  Ear/Nose/Throat: no thrush, mucositis.  Moist mucous membranes	  Neck:no JVD, no lymphadenopathy, supple  Respiratory: CTA tammie  Cardiovascular: S1S2 RRR, no murmurs  Gastrointestinal:soft, nontender,  nondistended (+) BS  Extremities:no e/e/c  Skin:  no rashes, open wounds or ulcerations        LABS:                        14.0   13.29 )-----------( 351      ( 07 Apr 2019 16:30 )             43.2     04-07    x   |  x   |  x   ----------------------------<  x   3.4<L>   |  x   |  x     Ca    8.5      07 Apr 2019 12:18                  Vancomycin Level, Trough: 16.3 ug/mL (04-06 @ 01:22)      Rapid RVP Result: Detected          MICROBIOLOGY:          RADIOLOGY & ADDITIONAL STUDIES:

## 2019-04-08 NOTE — PROGRESS NOTE ADULT - SUBJECTIVE AND OBJECTIVE BOX
Patient is a 89y old  Female who presents with a chief complaint of cough and sob (31 Mar 2019 16:27)      SUBJECTIVE / OVERNIGHT EVENTS:  No new symptosm. She was hypoxic with SaO2 of 86% on RA. She will need O2 at home.    Review of Systems:   CONSTITUTIONAL: No fever, weight loss, or fatigue  EYES: No eye pain, visual disturbances, or discharge  ENMT:  No difficulty hearing, tinnitus, vertigo; No sinus or throat pain  NECK: No pain or stiffness  BREASTS: No pain, masses, or nipple discharge  RESPIRATORY: No cough, wheezing, chills or hemoptysis; No shortness of breath  CARDIOVASCULAR: No chest pain, palpitations, dizziness, or leg swelling  GASTROINTESTINAL: No abdominal or epigastric pain. No nausea, vomiting, or hematemesis; No diarrhea or constipation. No melena or hematochezia.  GENITOURINARY: No dysuria, frequency, hematuria, or incontinence  NEUROLOGICAL: No headaches, memory loss, loss of strength, numbness, or tremors  SKIN: No itching, burning, rashes, or lesions   LYMPH NODES: No enlarged glands  ENDOCRINE: No heat or cold intolerance; No hair loss  MUSCULOSKELETAL: No joint pain or swelling; No muscle, back, or extremity pain  PSYCHIATRIC: No depression, anxiety, mood swings, or difficulty sleeping  HEME/LYMPH: No easy bruising, or bleeding gums  ALLERY AND IMMUNOLOGIC: No hives or eczema    MEDICATIONS  (STANDING):  amLODIPine   Tablet 5 milliGRAM(s) Oral daily  clopidogrel Tablet 75 milliGRAM(s) Oral daily  digoxin     Tablet 0.125 milliGRAM(s) Oral daily  enalapril 2.5 milliGRAM(s) Oral daily  escitalopram 10 milliGRAM(s) Oral daily  guaiFENesin/dextromethorphan  Syrup 10 milliLiter(s) Oral every 4 hours  heparin  Injectable 5000 Unit(s) SubCutaneous every 12 hours  latanoprost 0.005% Ophthalmic Solution 1 Drop(s) Both EYES at bedtime  metoprolol tartrate 25 milliGRAM(s) Oral every 12 hours  pantoprazole    Tablet 40 milliGRAM(s) Oral before breakfast  piperacillin/tazobactam IVPB. 3.375 Gram(s) IV Intermittent every 8 hours  timolol 0.5% Solution 1 Drop(s) Left EYE daily  vancomycin  IVPB 1000 milliGRAM(s) IV Intermittent every 12 hours    MEDICATIONS  (PRN):  acetaminophen   Tablet .. 650 milliGRAM(s) Oral every 6 hours PRN Temp greater or equal to 38C (100.4F), Moderate Pain (4 - 6)  haloperidol    Concentrate 0.5 milliGRAM(s) Oral every 12 hours PRN Agitation      PHYSICAL EXAM:  Vital Signs Last 24 Hrs  T(C): 36.5 (01 Apr 2019 04:38), Max: 36.6 (31 Mar 2019 14:30)  T(F): 97.7 (01 Apr 2019 04:38), Max: 97.9 (31 Mar 2019 14:30)  HR: 70 (01 Apr 2019 04:38) (59 - 70)  BP: 123/77 (01 Apr 2019 04:38) (123/77 - 146/79)  BP(mean): --  RR: 18 (01 Apr 2019 04:38) (18 - 18)  SpO2: 92% (01 Apr 2019 04:38) (90% - 94%)  I&O's Summary    31 Mar 2019 07:01  -  01 Apr 2019 07:00  --------------------------------------------------------  IN: 1610 mL / OUT: 0 mL / NET: 1610 mL      GENERAL: NAD, well-developed  HEAD:  Atraumatic, Normocephalic  EYES: EOMI, PERRLA, conjunctiva and sclera clear  NECK: Supple, No JVD  CHEST/LUNG: Clear to auscultation bilaterally; No wheeze  HEART: Regular rate and rhythm; No murmurs, rubs, or gallops  ABDOMEN: Soft, Nontender, Nondistended; Bowel sounds present  EXTREMITIES:  2+ Peripheral Pulses, No clubbing, cyanosis, or edema  PSYCH: AAOx3  NEUROLOGY: non-focal  SKIN: No rashes or lesions    LABS:  CAPILLARY BLOOD GLUCOSE                              13.7   11.08 )-----------( 259      ( 01 Apr 2019 09:35 )             42.6     04-01    142  |  103  |  10  ----------------------------<  106<H>  3.5   |  25  |  0.58    Ca    8.9      01 Apr 2019 06:15                RADIOLOGY & ADDITIONAL TESTS:    Imaging Personally Reviewed:    Consultant(s) Notes Reviewed:      Care Discussed with Consultants/Other Providers:

## 2019-04-09 NOTE — CHART NOTE - NSCHARTNOTEFT_GEN_A_CORE
follow up- patient with possible aspiration Pneumonia treated;    requiring oxygen supplement via nasal cannula for pulse ox at room air 86%  Hypoxic secondary to possible atelectasis and suspected chronic diastolic heart failure due to CAD and chronic atrial fibrillation;  Adri Segura(NP)  3 [facility], [phone] follow up- patient with possible aspiration Pneumonia treated;    requiring oxygen supplement via nasal cannula for pulse ox at room air 86%  Hypoxic secondary to  atelectasis and suspected chronic diastolic heart failure due to CAD and chronic atrial fibrillation;  Adri Segura(NP)  3 [facility], [phone] follow up- patient with possible aspiration Pneumonia treated;    requiring oxygen supplement via nasal cannula for pulse ox at room air 86% at rest.  Hypoxic secondary to  atelectasis and suspected chronic diastolic heart failure due to CAD and chronic atrial fibrillation;  Adri Segura(NP)  3 [facility], [phone]

## 2019-04-09 NOTE — PROGRESS NOTE ADULT - ASSESSMENT
89 YOF PMHx of Dementia, HTN, HLD, CAD, Afib, p/w cough and sob from [facility]. Pt denies having any symptoms and is upset that she was sent here. Pt appears confused and is not coherently answering questions. Per accompanying paperwork pt has a worsening productive cough and has appeared to have laboured breathing. Sent in for evaluation.    In the ED, Chest X Ray reveals mid lobe infiltrate. (30 Mar 2019 18:09)    ER vss.   Afebrile.  WBC 12.9.  Lact 2.2.  RVP (+) entero/rhinovirus.  CXR with b/l medial lung opacities.  Pt on vanco/zosyn for pna.   Pt has HCP/daughter (Mahi Kirkpatrick) who lives in [place]  - pt is DNR/I.  Reported known h/o of aspiration and confirms that comfort feeds were elected on past admissions in setting of likely recurrent aspiration.   HCP declined swallow eval as per chart notes.    ID consult called for further abx managment.        Recommend:    - Pt with viral URI with entero/rhinovirus.  Cont supportive care, no role for antivirals.    - Pt also with dysphagia, and is on comfort feeds as per chart notes and d/w HCP.  s/p trt of asp pna, completed on 4/6.  pt at risk for continued aspiration.          * Cont to monitor off abx.  d/c planning as per primary team.  No acute ID issues at this time      Will follow,    Bernice Murphy  [phone]

## 2019-04-09 NOTE — PROGRESS NOTE ADULT - SUBJECTIVE AND OBJECTIVE BOX
Infectious Diseases progress note:    Subjective: Comfortable, NAD, afebrile.      ROS:  CONSTITUTIONAL:  No fever, chills, rigors  CARDIOVASCULAR:  No chest pain or palpitations  RESPIRATORY:   No SOB, cough, dyspnea on exertion.  No wheezing  GASTROINTESTINAL:  No abd pain, N/V, diarrhea/constipation  EXTREMITIES:  No swelling or joint pain  GENITOURINARY:  No burning on urination, increased frequency or urgency.  No flank pain  NEUROLOGIC:  No HA, visual disturbances  SKIN: No rashes    Allergies    No Known Allergies    Intolerances        ANTIBIOTICS/RELEVANT:  antimicrobials    immunologic:    OTHER:  acetaminophen   Tablet .. 650 milliGRAM(s) Oral every 6 hours PRN  amLODIPine   Tablet 5 milliGRAM(s) Oral daily  clopidogrel Tablet 75 milliGRAM(s) Oral daily  digoxin     Tablet 0.125 milliGRAM(s) Oral daily  docusate sodium 100 milliGRAM(s) Oral three times a day  enalapril 2.5 milliGRAM(s) Oral daily  escitalopram 10 milliGRAM(s) Oral daily  guaiFENesin/dextromethorphan  Syrup 10 milliLiter(s) Oral every 4 hours  haloperidol    Concentrate 0.5 milliGRAM(s) Oral every 12 hours PRN  heparin  Injectable 5000 Unit(s) SubCutaneous every 12 hours  latanoprost 0.005% Ophthalmic Solution 1 Drop(s) Both EYES at bedtime  metoprolol tartrate 25 milliGRAM(s) Oral every 12 hours  pantoprazole    Tablet 40 milliGRAM(s) Oral before breakfast  polyethylene glycol 3350 17 Gram(s) Oral daily  senna 2 Tablet(s) Oral at bedtime  timolol 0.5% Solution 1 Drop(s) Left EYE daily      Objective:  Vital Signs Last 24 Hrs  T(C): 37 (09 Apr 2019 12:09), Max: 37 (09 Apr 2019 12:09)  T(F): 98.6 (09 Apr 2019 12:09), Max: 98.6 (09 Apr 2019 12:09)  HR: 67 (09 Apr 2019 12:09) (55 - 70)  BP: 122/66 (09 Apr 2019 12:09) (122/66 - 163/62)  BP(mean): --  RR: 18 (09 Apr 2019 12:09) (18 - 18)  SpO2: 92% (09 Apr 2019 12:09) (92% - 94%)    PHYSICAL EXAM:  Constitutional:NAD, on nasal cannula  Eyes:RENIALDO, EOMI  Ear/Nose/Throat: no thrush, mucositis.  Moist mucous membranes	  Neck:no JVD, no lymphadenopathy, supple  Respiratory: CTA tammie  Cardiovascular: S1S2 RRR, no murmurs  Gastrointestinal:soft, nontender,  nondistended (+) BS  Extremities:no e/e/c  Skin:  no rashes, open wounds or ulcerations        LABS:                        14.0   13.29 )-----------( 351      ( 07 Apr 2019 16:30 )             43.2     04-07    x   |  x   |  x   ----------------------------<  x   3.4<L>   |  x   |  x                     Vancomycin Level, Trough: 16.3 ug/mL (04-06 @ 01:22)      Rapid RVP Result: Detected          MICROBIOLOGY:    Culture - Blood (03.30.19 @ 10:39)    Specimen Source: .Blood Blood    Culture Results:   No growth at 5 days.    Culture - Blood (03.30.19 @ 10:39)    Specimen Source: .Blood Blood    Culture Results:   No growth at 5 days.          RADIOLOGY & ADDITIONAL STUDIES:

## 2019-04-10 NOTE — CHART NOTE - NSCHARTNOTEFT_GEN_A_CORE
follow up- Informed by case yana that Assisted living facility is not able to take pt back on dysphagia diet; pt has to be on regular diet to return to facility; had d/w MD, pt daughter Mahi and pt to proceed with speech/swallow eval and MBS to eval;  d/w pt daughter risk of aspiration/choking/ risk of pneumonia if not adhered with appropriate diet; daughter understands; will follow up with MBS to determine diet consistency.  Adri Segura(NP)  3 [facility], [phone]

## 2019-04-10 NOTE — SWALLOW BEDSIDE ASSESSMENT ADULT - NS ASR SWALLOW FINDINGS DISCUS
NP Adri; Pt's daughter Mahi in agreement for MBS and is requesting SLP to speak with family friend who is a nurse: re: findings and recommendations NP Adri; Pt's daughter Mahi in agreement for MBS and is requesting SLP to speak with family friend who is a nurse Leona Dunbar: kalen: findings and recommendations

## 2019-04-10 NOTE — SWALLOW BEDSIDE ASSESSMENT ADULT - ORAL PREPARATORY PHASE
mild/Within functional limits/Reduced oral grading Decreased mastication ability/Reduced oral grading/Mild suspect a-p spillage/Decreased mastication ability

## 2019-04-10 NOTE — SWALLOW BEDSIDE ASSESSMENT ADULT - SWALLOW EVAL: DIAGNOSIS
Pt presents with oropharyngeal dysphagia which appears to be superimposed upon by underlying cognitive deficits.  Intermittent s/s of aspiration evident on thin liquids and soft solids.  However, silent laryngeal penetration/aspiration can not be r/o at bedside with purees, nectar/honey thickened liquids or mechanical soft texture.  Orientation to feeding, oral grading, formation and mastication of bolus are impaired.  Pharyngeal swallow is judged to be mildly delayed with reduced laryngeal elevation.

## 2019-04-10 NOTE — PROGRESS NOTE ADULT - SUBJECTIVE AND OBJECTIVE BOX
Patient is a 89y old  Female who presents with a chief complaint of cough and sob (31 Mar 2019 16:27)      SUBJECTIVE / OVERNIGHT EVENTS:  No new symptosm.She has signs of dysphagia. Swallowing evaluation done today    Review of Systems:   CONSTITUTIONAL: No fever, weight loss, or fatigue  EYES: No eye pain, visual disturbances, or discharge  ENMT:  No difficulty hearing, tinnitus, vertigo; No sinus or throat pain  NECK: No pain or stiffness  BREASTS: No pain, masses, or nipple discharge  RESPIRATORY: No cough, wheezing, chills or hemoptysis; No shortness of breath  CARDIOVASCULAR: No chest pain, palpitations, dizziness, or leg swelling  GASTROINTESTINAL: No abdominal or epigastric pain. No nausea, vomiting, or hematemesis; No diarrhea or constipation. No melena or hematochezia.  GENITOURINARY: No dysuria, frequency, hematuria, or incontinence  NEUROLOGICAL: No headaches, memory loss, loss of strength, numbness, or tremors  SKIN: No itching, burning, rashes, or lesions   LYMPH NODES: No enlarged glands  ENDOCRINE: No heat or cold intolerance; No hair loss  MUSCULOSKELETAL: No joint pain or swelling; No muscle, back, or extremity pain  PSYCHIATRIC: No depression, anxiety, mood swings, or difficulty sleeping  HEME/LYMPH: No easy bruising, or bleeding gums  ALLERY AND IMMUNOLOGIC: No hives or eczema    MEDICATIONS  (STANDING):  amLODIPine   Tablet 5 milliGRAM(s) Oral daily  clopidogrel Tablet 75 milliGRAM(s) Oral daily  digoxin     Tablet 0.125 milliGRAM(s) Oral daily  enalapril 2.5 milliGRAM(s) Oral daily  escitalopram 10 milliGRAM(s) Oral daily  guaiFENesin/dextromethorphan  Syrup 10 milliLiter(s) Oral every 4 hours  heparin  Injectable 5000 Unit(s) SubCutaneous every 12 hours  latanoprost 0.005% Ophthalmic Solution 1 Drop(s) Both EYES at bedtime  metoprolol tartrate 25 milliGRAM(s) Oral every 12 hours  pantoprazole    Tablet 40 milliGRAM(s) Oral before breakfast  piperacillin/tazobactam IVPB. 3.375 Gram(s) IV Intermittent every 8 hours  timolol 0.5% Solution 1 Drop(s) Left EYE daily  vancomycin  IVPB 1000 milliGRAM(s) IV Intermittent every 12 hours    MEDICATIONS  (PRN):  acetaminophen   Tablet .. 650 milliGRAM(s) Oral every 6 hours PRN Temp greater or equal to 38C (100.4F), Moderate Pain (4 - 6)  haloperidol    Concentrate 0.5 milliGRAM(s) Oral every 12 hours PRN Agitation      PHYSICAL EXAM:  Vital Signs Last 24 Hrs  T(C): 36.5 (01 Apr 2019 04:38), Max: 36.6 (31 Mar 2019 14:30)  T(F): 97.7 (01 Apr 2019 04:38), Max: 97.9 (31 Mar 2019 14:30)  HR: 70 (01 Apr 2019 04:38) (59 - 70)  BP: 123/77 (01 Apr 2019 04:38) (123/77 - 146/79)  BP(mean): --  RR: 18 (01 Apr 2019 04:38) (18 - 18)  SpO2: 92% (01 Apr 2019 04:38) (90% - 94%)  I&O's Summary    31 Mar 2019 07:01  -  01 Apr 2019 07:00  --------------------------------------------------------  IN: 1610 mL / OUT: 0 mL / NET: 1610 mL      GENERAL: NAD, well-developed  HEAD:  Atraumatic, Normocephalic  EYES: EOMI, PERRLA, conjunctiva and sclera clear  NECK: Supple, No JVD  CHEST/LUNG: Clear to auscultation bilaterally; No wheeze  HEART: Regular rate and rhythm; No murmurs, rubs, or gallops  ABDOMEN: Soft, Nontender, Nondistended; Bowel sounds present  EXTREMITIES:  2+ Peripheral Pulses, No clubbing, cyanosis, or edema  PSYCH: AAOx3  NEUROLOGY: non-focal  SKIN: No rashes or lesions    LABS:  CAPILLARY BLOOD GLUCOSE                              13.7   11.08 )-----------( 259      ( 01 Apr 2019 09:35 )             42.6     04-01    142  |  103  |  10  ----------------------------<  106<H>  3.5   |  25  |  0.58    Ca    8.9      01 Apr 2019 06:15                RADIOLOGY & ADDITIONAL TESTS:    Imaging Personally Reviewed:    Consultant(s) Notes Reviewed:      Care Discussed with Consultants/Other Providers:

## 2019-04-10 NOTE — SWALLOW BEDSIDE ASSESSMENT ADULT - ORAL PHASE
Decreased anterior-posterior movement of the bolus/Delayed oral transit time/Within functional limits Decreased anterior-posterior movement of the bolus/Delayed oral transit time Decreased anterior-posterior movement of the bolus/Delayed oral transit time/sip of liquid cleared mild oral residue/Lingual stasis

## 2019-04-10 NOTE — SWALLOW BEDSIDE ASSESSMENT ADULT - SWALLOW EVAL: RECOMMENDED DIET
Silent aspiration can not be r/o at b/s as objective testing is warranted; would suggest Palliative Care consult to determine the GOC re: to provision of nutrition in this patient with dementia.  Pending MD f/u with Pt family/HCP, consider Dysphagia 1 diet with nectar thickened liquids if Pt family/HCP wishes for Pt to receive pleasure feeds.

## 2019-04-10 NOTE — SWALLOW BEDSIDE ASSESSMENT ADULT - SWALLOW EVAL: PATIENT/FAMILY GOALS STATEMENT
Pt's daughter Mahi wishes for the Pt to be d/c to the [facility] and is concerned that the Pt has been losing weight on the current diet as she reportedly dislikes the food and liquids.

## 2019-04-10 NOTE — SWALLOW BEDSIDE ASSESSMENT ADULT - SLP GENERAL OBSERVATIONS
Pt awake, alert and oriented x 1; Pt friendly and cooperative.  Pt reports dislike to the thickened liquids with facial grimacing evident during trials.  Pt frequently requesting sips of ice water.

## 2019-04-10 NOTE — SWALLOW BEDSIDE ASSESSMENT ADULT - COMMENTS
3/31/19  Per MD: ID consult:  Pt also with dysphagia, and is on comfort feeds as per chart notes and d/w HCP.  Pt with reported cough with thin liquids.  HCP refusing swallow evaluation, and understands risk for aspiration.  Agree with coverage for possible superimposed bacterial pna/aspiration.  Cont vanco/zosyn.  Also cover for HCAP.  Behavioral health: Patient often becomes more confused in new settings and when she has acute infection.  She tried to reach out to this MD shoulder, she seems to be responding to internal stimuli, She is alert, however not oriented to place, time , only to herself.  4/1/19 MD f/u:  Pneumonia of both lungs due to infectious organism, unspecified part of lung.  Plan: Suspect aspiration. Family does not want PEG. Started on dysphagia diet. May need GOC discussion. PT eval called as well to assess functional status and ability to return to assisted living facility.   4/5/19  ID f/u: RVP (+) entero/rhinovirus.  CXR with b/l medial lung opacities.  Pt on vanco/zosyn for pna.   Pt has HCP/daughter (Mahi Kirkpatrick) who lives in [place]  - pt is DNR/I.  Reported known h/o of aspiration and confirms that comfort feeds were elected on past admissions in setting of likely recurrent aspiration.   HCP declined swallow eval as per chart notes.  ID consult called for further abx managment.    4/9/19  MD f/u: Pneumonia of both lungs due to infectious organism, unspecified part of lung.  Plan: Hypoxic sec to Pneumonia and suspected chronic diastolic heart failure due to CAD and chronic atrial fibrillation; . DC planning since she has completed the course of abx.     ID f/u:  Pt with viral URI with entero/rhinovirus.  Cont supportive care, no role for antivirals.  - Pt also with dysphagia, and is on comfort feeds as per chart notes and d/w HCP.  s/p trt of asp pna, completed on 4/6.  pt at risk for continued aspiration.

## 2019-04-10 NOTE — SWALLOW BEDSIDE ASSESSMENT ADULT - SLP PERTINENT HISTORY OF CURRENT PROBLEM
89 YOF PMHx of Dementia, HTN, HLD, CAD, Afib, p/w cough and sob from [facility]. Pt denies having any symptoms and is upset that she was sent here. Pt appears confused and is not coherently answering questions. Per accompanying paperwork pt has a worsening productive cough and has appeared to have laboured breathing. Sent in for evaluation.  In the ED, Chest X Ray reveals mid lobe infiltrate. (30 Mar 2019 18:09)  3/31/19 Per MD:  Aspiration pneumonia of both lower lobes, unspecified aspiration pneumonia type.  Plan: On Vanco and zosyn, suspect gram negative organisms. Speech and swallow testing may not help if family does not want PEG.. Dysphagia diet advised.

## 2019-04-10 NOTE — SWALLOW BEDSIDE ASSESSMENT ADULT - PHARYNGEAL PHASE
clear vocal quality post swallow; no s/s of laryngeal penetration/aspiration/Delayed pharyngeal swallow/Decreased laryngeal elevation Delayed pharyngeal swallow/no s/s of laryngeal penetration/aspiration/Decreased laryngeal elevation Delayed pharyngeal swallow/Decreased laryngeal elevation/Cough post oral intake

## 2019-04-11 NOTE — PROGRESS NOTE ADULT - SUBJECTIVE AND OBJECTIVE BOX
Infectious Diseases progress note:    Subjective: Events noted.  Pt failed speech and swallow.  Pt pending placement to short term rehab.  Family desires to maintain pleasure feeds and understand aspiration risk.  No new fevers.      ROS:  CONSTITUTIONAL:  No fever, chills, rigors  CARDIOVASCULAR:  No chest pain or palpitations  RESPIRATORY:   No SOB, cough, dyspnea on exertion.  No wheezing  GASTROINTESTINAL:  No abd pain, N/V, diarrhea/constipation  EXTREMITIES:  No swelling or joint pain  GENITOURINARY:  No burning on urination, increased frequency or urgency.  No flank pain  NEUROLOGIC:  No HA, visual disturbances  SKIN: No rashes    Allergies    No Known Allergies    Intolerances        ANTIBIOTICS/RELEVANT:  antimicrobials    immunologic:    OTHER:  acetaminophen   Tablet .. 650 milliGRAM(s) Oral every 6 hours PRN  amLODIPine   Tablet 5 milliGRAM(s) Oral daily  clopidogrel Tablet 75 milliGRAM(s) Oral daily  digoxin     Tablet 0.125 milliGRAM(s) Oral daily  enalapril 2.5 milliGRAM(s) Oral daily  escitalopram 10 milliGRAM(s) Oral daily  guaiFENesin/dextromethorphan  Syrup 10 milliLiter(s) Oral every 4 hours  haloperidol    Concentrate 0.5 milliGRAM(s) Oral every 12 hours PRN  heparin  Injectable 5000 Unit(s) SubCutaneous every 12 hours  latanoprost 0.005% Ophthalmic Solution 1 Drop(s) Both EYES at bedtime  metoprolol tartrate 25 milliGRAM(s) Oral every 12 hours  pantoprazole    Tablet 40 milliGRAM(s) Oral before breakfast  polyethylene glycol 3350 17 Gram(s) Oral daily  senna 2 Tablet(s) Oral at bedtime  timolol 0.5% Solution 1 Drop(s) Left EYE daily      Objective:  Vital Signs Last 24 Hrs  T(C): 36.4 (11 Apr 2019 11:25), Max: 36.9 (10 Apr 2019 21:07)  T(F): 97.6 (11 Apr 2019 11:25), Max: 98.4 (10 Apr 2019 21:07)  HR: 62 (11 Apr 2019 11:25) (62 - 68)  BP: 151/82 (11 Apr 2019 11:25) (142/83 - 154/85)  BP(mean): --  RR: 18 (11 Apr 2019 11:25) (18 - 18)  SpO2: 97% (11 Apr 2019 11:25) (94% - 97%)    PHYSICAL EXAM:  Constitutional:NAD, frail/elderly  Eyes:REINALDO, EOMI  Ear/Nose/Throat: no thrush, mucositis.  Moist mucous membranes	  Neck:no JVD, no lymphadenopathy, supple  Respiratory: coarse bs b/l  Cardiovascular: S1S2 RRR, no murmurs  Gastrointestinal:soft, nontender,  nondistended (+) BS  Extremities:no e/e/c  Skin:  no rashes, open wounds or ulcerations        LABS:                          Rapid RVP Result: Detected          MICROBIOLOGY:    Culture - Blood (03.30.19 @ 10:39)    Specimen Source: .Blood Blood    Culture Results:   No growth at 5 days.    Culture - Blood (03.30.19 @ 10:39)    Specimen Source: .Blood Blood    Culture Results:   No growth at 5 days.          RADIOLOGY & ADDITIONAL STUDIES:    < from: Xray Chest 1 View AP/PA (03.30.19 @ 07:01) >  FINDINGS:   The heart size is not accurately evaluated.  Medial right mid to lower lung and left lower lung opacities. There are   no pleural effusions or pneumothorax.  Degenerative changes of the thoracic spine.    IMPRESSION:   Bilateral medial lung opacities which may represent atelectasis and/or   pneumonia.    < end of copied text >

## 2019-04-11 NOTE — CONSULT NOTE ADULT - ASSESSMENT
89 YOF PMHx of Dementia, HTN, HLD, CAD, Afib, p/w cough and sob from [facility]. On admission it was indicated the patient was confused and was not coherently answering questions. Per accompanying paperwork pt has has a worsening productive cough and has appeared to have laboured breathing. She is currently treated for aspiration PNA. S&S eval is recommending for NPO; however, as per NP GOC discussion, "pt daughter Mahi who understands the risk of aspiration and wants to continue with po feeds; informed by , PAT can't take pt with dysphagia diet/risk of aspiration." Palliative care was called for GOC.

## 2019-04-11 NOTE — CONSULT NOTE ADULT - PROBLEM SELECTOR RECOMMENDATION 3
Patient has already a MOLST in the chart that was completed on 3/30. She is DNR/I, Limited medical interventions, determine use of Abx if infection occurs.   I d/w the patient's HCP about the limited benefits for [facility] in a patient like Mrs. De Los Santos and that understanding her illness a NH with hospice ( and ideally also with inpatient hospice) will be a better option. Mahi will further d/w her siblings and then contact the floor  with their decision. If she decides for NH with hospice, a hospice referral will also need to be done.   Since GOC are well defined and the only issue is dispo planning, I will sign off. Please call back if having issues with symptoms Rx or GOC discussions.   16' were spent in ACP.

## 2019-04-11 NOTE — CONSULT NOTE ADULT - SUBJECTIVE AND OBJECTIVE BOX
HPI:  89 YOF PMHx of Dementia, HTN, HLD, CAD, Afib, p/w cough and sob from [facility]. On admission it was indicated the patient was confused and was not coherently answering questions. Per accompanying paperwork pt has has a worsening productive cough and has appeared to have laboured breathing. She is currently treated for aspiration PNA. S&S eval is recommending for NPO; however, as per NP GOC discussion, "pt daughter Mahi who understands the risk of aspiration and wants to continue with po feeds; informed by , PAT can't take pt with dysphagia diet/risk of aspiration." Palliative care was called for GOC.     PERTINENT PM/SXH:   Clostridium difficile diarrhea  Hyperlipidemia, unspecified hyperlipidemia type  Coronary artery disease involving native coronary artery of native heart without angina pectoris  Atrial fibrillation, unspecified type  GERD (gastroesophageal reflux disease)  HTN (hypertension)  Dementia    No significant past surgical history    FAMILY HISTORY:  No pertinent family history in first degree relatives    ITEMS NOT CHECKED ARE NOT PRESENT    SOCIAL HISTORY:   Significant other/partner:   [ ]  Children:  [ ]  Catholic/Spirituality: Orthodox   Substance hx:  [ ]   Tobacco hx:  [ ]   Alcohol hx: [ ]   Home Opioid hx:  [ ] I-Stop Reference No:  Living Situation: [ ]Home  [ ]Long term care  [ ]Rehab [ ]Other  As per care coordination notes: " As per chart pt lives at [facility] [place] requires assist  with her personal care and ambulates with a walker. Further discharge needs are  pending hospital course. Pt unable to identify a caregiver."   ADVANCE DIRECTIVES:    DNR  Yes  MOLST  [ ]  Living Will  [ ]   DECISION MAKER(s):  [ ] Health Care Proxy(s)  [ ] Surrogate(s)  [ ] Guardian           Name(s): Phone Number(s):    BASELINE (I)ADL(s) (prior to admission):  [place]: [ ]Total  [ ] Moderate [ ]Dependent    Allergies    No Known Allergies    Intolerances    MEDICATIONS  (STANDING):  amLODIPine   Tablet 5 milliGRAM(s) Oral daily  clopidogrel Tablet 75 milliGRAM(s) Oral daily  digoxin     Tablet 0.125 milliGRAM(s) Oral daily  enalapril 2.5 milliGRAM(s) Oral daily  escitalopram 10 milliGRAM(s) Oral daily  guaiFENesin/dextromethorphan  Syrup 10 milliLiter(s) Oral every 4 hours  heparin  Injectable 5000 Unit(s) SubCutaneous every 12 hours  latanoprost 0.005% Ophthalmic Solution 1 Drop(s) Both EYES at bedtime  metoprolol tartrate 25 milliGRAM(s) Oral every 12 hours  pantoprazole    Tablet 40 milliGRAM(s) Oral before breakfast  polyethylene glycol 3350 17 Gram(s) Oral daily  senna 2 Tablet(s) Oral at bedtime  timolol 0.5% Solution 1 Drop(s) Left EYE daily    MEDICATIONS  (PRN):  acetaminophen   Tablet .. 650 milliGRAM(s) Oral every 6 hours PRN Temp greater or equal to 38C (100.4F), Moderate Pain (4 - 6)  haloperidol    Concentrate 0.5 milliGRAM(s) Oral every 12 hours PRN Agitation    PRESENT SYMPTOMS: [ ]Unable to obtain due to poor mentation   Source if other than patient:  [ ]Family   [ ]Team     Pain (Impact on QOL):    Location -         Minimal acceptable level (0-10 scale):                    Aggravating factors -  Quality -  Radiation -  Severity (0-10 scale) -    Timing -    PAIN AD Score:     [email or web address].pdf (press ctrl +  left click to view)    Dyspnea:                           [ ]Mild [ ]Moderate [ ]Severe  Anxiety:                             [ ]Mild [ ]Moderate [ ]Severe  Fatigue:                             [ ]Mild [ ]Moderate [ ]Severe  Nausea:                             [ ]Mild [ ]Moderate [ ]Severe  Loss of appetite:              [ ]Mild [ ]Moderate [ ]Severe  Constipation:                    [ ]Mild [ ]Moderate [ ]Severe    Other Symptoms:  [ ]All other review of systems negative     Karnofsky Performance Score/Palliative Performance Status Version 2:         %    http://palliative.info/resource_material/PPSv2.pdf  PHYSICAL EXAM:  Vital Signs Last 24 Hrs  T(C): 36.4 (11 Apr 2019 11:25), Max: 36.9 (10 Apr 2019 21:07)  T(F): 97.6 (11 Apr 2019 11:25), Max: 98.4 (10 Apr 2019 21:07)  HR: 62 (11 Apr 2019 11:25) (62 - 68)  BP: 151/82 (11 Apr 2019 11:25) (142/83 - 154/85)  BP(mean): --  RR: 18 (11 Apr 2019 11:25) (18 - 18)  SpO2: 97% (11 Apr 2019 11:25) (94% - 97%) I&O's Summary    10 Apr 2019 07:01  -  11 Apr 2019 07:00  --------------------------------------------------------  IN: 840 mL / OUT: 0 mL / NET: 840 mL    11 Apr 2019 07:01  -  11 Apr 2019 16:17  --------------------------------------------------------  IN: 360 mL / OUT: 0 mL / NET: 360 mL    GENERAL:  [ ]Alert  [ ]Oriented x   [ ]Lethargic  [ ]Cachexia  [ ]Unarousable  [ ]Verbal  [ ]Non-Verbal  Behavioral:   [ ] Anxiety  [ ] Delirium [ ] Agitation [ ] Other  HEENT:  [ ]Normal   [ ]Dry mouth   [ ]ET Tube/Trach  [ ]Oral lesions  PULMONARY:   [ ]Clear [ ]Tachypnea  [ ]Audible excessive secretions   [ ]Rhonchi        [ ]Right [ ]Left [ ]Bilateral  [ ]Crackles        [ ]Right [ ]Left [ ]Bilateral  [ ]Wheezing     [ ]Right [ ]Left [ ]Bilateral  CARDIOVASCULAR:    [ ]Regular [ ]Irregular [ ]Tachy  [ ]John [ ]Murmur [ ]Other  GASTROINTESTINAL:  [ ]Soft  [ ]Distended   [ ]+BS  [ ]Non tender [ ]Tender  [ ]PEG [ ]OGT/ NGT  Last BM:   GENITOURINARY:  [ ]Normal [ ] Incontinent   [ ]Oliguria/Anuria   [ ]Shook  MUSCULOSKELETAL:   [ ]Normal   [ ]Weakness  [ ]Bed/Wheelchair bound [ ]Edema  NEUROLOGIC:   [ ]No focal deficits  [ ] Cognitive impairment  [ ] Dysphagia [ ]Dysarthria [ ] Paresis [ ]Other   SKIN:   [ ]Normal   [ ]Pressure ulcer(s)  [ ]Rash    CRITICAL CARE:  [ ] Shock Present  [ ]Septic [ ]Cardiogenic [ ]Neurologic [ ]Hypovolemic  [ ]  Vasopressors [ ]  Inotropes   [ ] Respiratory failure present  [ ] Acute  [ ] Chronic [ ] Hypoxic  [ ] Hypercarbic [ ] Other  [ ] Other organ failure     LABS:            RADIOLOGY & ADDITIONAL STUDIES:    PROTEIN CALORIE MALNUTRITION PRESENT: [ ] Yes [ ] No  [ ] PPSV2 < or = to 30% [ ] significant weight loss  [ ] poor nutritional intake [ ] catabolic state [ ] anasarca     Albumin, Serum: 3.4 g/dL (03-30-19 @ 07:05)  Artificial Nutrition [ ]     REFERRALS:   [ ]Chaplaincy  [ ] Hospice  [ ]Child Life  [ ]Social Work  [ ]Case management [ ]Holistic Therapy   Goals of Care Discussion Document: Goals of Care Conversation - Personal Advance Directive [NANCI Chinchilla] (03-30-19 @ 07:50) HPI:  89 YOF PMHx of Dementia, HTN, HLD, CAD, Afib, p/w cough and sob from [facility]. On admission it was indicated the patient was confused and was not coherently answering questions. Per accompanying paperwork pt has has a worsening productive cough and has appeared to have laboured breathing. She is currently treated for aspiration PNA. S&S eval is recommending for NPO; however, as per NP GOC discussion, "pt daughter Mahi who understands the risk of aspiration and wants to continue with po feeds; informed by , care home can't take pt with dysphagia diet/risk of aspiration." Palliative care was called for GOC.     4/11: The patient was confused. She indicated she was in a hotel. However, after re-directing her, she stated she was in the hospital though she did not know in what hospital she was. She did not know the reasons for being in the hospital. She also refused to be examined indicating that "they said it was not good." She did not know the date. She denied pain or dyspnea.     PERTINENT PM/SXH:   Clostridium difficile diarrhea  Hyperlipidemia, unspecified hyperlipidemia type  Coronary artery disease involving native coronary artery of native heart without angina pectoris  Atrial fibrillation, unspecified type  GERD (gastroesophageal reflux disease)  HTN (hypertension)  Dementia    No significant past surgical history    FAMILY HISTORY:  No pertinent family history in first degree relatives    ITEMS NOT CHECKED ARE NOT PRESENT    SOCIAL HISTORY:   Significant other/partner:   [ ]  Children:  [ ]  Baptism/Spirituality: Mu-ism   Substance hx:  [ ]   Tobacco hx:  [ ]   Alcohol hx: [ ]   Home Opioid hx:  [ ] I-Stop Reference No:  Living Situation: [ ]Home  [ ]Long term care  [ ]Rehab [ ]Other  As per care coordination notes: " As per chart pt lives at [facility] requires assist  with her personal care and ambulates with a walker. Further discharge needs are  pending hospital course. Pt unable to identify a caregiver."   ADVANCE DIRECTIVES:    DNR  Yes  MOLST  [x ]  Living Will  [ ]   DECISION MAKER(s):  [x ] Health Care Proxy(s)  [ ] Surrogate(s)  [ ] Guardian           Name(s): Phone Number(s): Daughter Mahi (she is to bring the HCP form).     BASELINE (I)ADL(s) (prior to admission):  [place]: [ ]Total  [ ] Moderate [ x]Dependent    Allergies    No Known Allergies    Intolerances    MEDICATIONS  (STANDING):  amLODIPine   Tablet 5 milliGRAM(s) Oral daily  clopidogrel Tablet 75 milliGRAM(s) Oral daily  digoxin     Tablet 0.125 milliGRAM(s) Oral daily  enalapril 2.5 milliGRAM(s) Oral daily  escitalopram 10 milliGRAM(s) Oral daily  guaiFENesin/dextromethorphan  Syrup 10 milliLiter(s) Oral every 4 hours  heparin  Injectable 5000 Unit(s) SubCutaneous every 12 hours  latanoprost 0.005% Ophthalmic Solution 1 Drop(s) Both EYES at bedtime  metoprolol tartrate 25 milliGRAM(s) Oral every 12 hours  pantoprazole    Tablet 40 milliGRAM(s) Oral before breakfast  polyethylene glycol 3350 17 Gram(s) Oral daily  senna 2 Tablet(s) Oral at bedtime  timolol 0.5% Solution 1 Drop(s) Left EYE daily    MEDICATIONS  (PRN):  acetaminophen   Tablet .. 650 milliGRAM(s) Oral every 6 hours PRN Temp greater or equal to 38C (100.4F), Moderate Pain (4 - 6)  haloperidol    Concentrate 0.5 milliGRAM(s) Oral every 12 hours PRN Agitation    PRESENT SYMPTOMS: [x ]Unable to obtain due to poor mentation   Source if other than patient:  [ ]Family   [ ]Team     Pain (Impact on QOL):    Location -         Minimal acceptable level (0-10 scale):                    Aggravating factors -  Quality -  Radiation -  Severity (0-10 scale) -    Timing -    PAIN AD Score:     http://geriatrictoolkit.[email or web address]/painad.pdf (press ctrl +  left click to view)    Dyspnea:                           [ ]Mild [ ]Moderate [ ]Severe  Anxiety:                             [ ]Mild [ ]Moderate [ ]Severe  Fatigue:                             [ ]Mild [ ]Moderate [ ]Severe  Nausea:                             [ ]Mild [ ]Moderate [ ]Severe  Loss of appetite:              [ ]Mild [ ]Moderate [ ]Severe  Constipation:                    [ ]Mild [ ]Moderate [ ]Severe    Other Symptoms:  [ ]All other review of systems negative     Karnofsky Performance Score/Palliative Performance Status Version 2:    30     %    http://palliative.info/resource_material/PPSv2.pdf  PHYSICAL EXAM:  Vital Signs Last 24 Hrs  T(C): 36.4 (11 Apr 2019 11:25), Max: 36.9 (10 Apr 2019 21:07)  T(F): 97.6 (11 Apr 2019 11:25), Max: 98.4 (10 Apr 2019 21:07)  HR: 62 (11 Apr 2019 11:25) (62 - 68)  BP: 151/82 (11 Apr 2019 11:25) (142/83 - 154/85)  BP(mean): --  RR: 18 (11 Apr 2019 11:25) (18 - 18)  SpO2: 97% (11 Apr 2019 11:25) (94% - 97%) I&O's Summary    10 Apr 2019 07:01  -  11 Apr 2019 07:00  --------------------------------------------------------  IN: 840 mL / OUT: 0 mL / NET: 840 mL    11 Apr 2019 07:01  -  11 Apr 2019 16:17  --------------------------------------------------------  IN: 360 mL / OUT: 0 mL / NET: 360 mL    GENERAL:  [x ]Alert  [ ]Oriented x 1  [ ]Lethargic  [ ]Cachexia  [ ]Unarousable  [x ]Verbal  [ ]Non-Verbal  PATIENT REFUSED TO BE EXAMINED.   Behavioral:   [ ] Anxiety  [ ] Delirium [ ] Agitation [ ] Other  HEENT:  [ ]Normal   [ ]Dry mouth   [ ]ET Tube/Trach  [ ]Oral lesions  PULMONARY:   [ ]Clear [ ]Tachypnea  [ ]Audible excessive secretions   [ ]Rhonchi        [ ]Right [ ]Left [ ]Bilateral  [ ]Crackles        [ ]Right [ ]Left [ ]Bilateral  [ ]Wheezing     [ ]Right [ ]Left [ ]Bilateral  CARDIOVASCULAR:    [ ]Regular [ ]Irregular [ ]Tachy  [ ]John [ ]Murmur [ ]Other  GASTROINTESTINAL:  [ ]Soft  [ ]Distended   [ ]+BS  [ ]Non tender [ ]Tender  [ ]PEG [ ]OGT/ NGT  Last BM:   GENITOURINARY:  [ ]Normal [ ] Incontinent   [ ]Oliguria/Anuria   [ ]Shook  MUSCULOSKELETAL:   [ ]Normal   [ ]Weakness  [ ]Bed/Wheelchair bound [ ]Edema  NEUROLOGIC:   [ ]No focal deficits  [ ] Cognitive impairment  [ ] Dysphagia [ ]Dysarthria [ ] Paresis [ ]Other   SKIN:   [ ]Normal   [ ]Pressure ulcer(s)  [ ]Rash    CRITICAL CARE:  [ ] Shock Present  [ ]Septic [ ]Cardiogenic [ ]Neurologic [ ]Hypovolemic  [ ]  Vasopressors [ ]  Inotropes   [ ] Respiratory failure present  [ ] Acute  [ ] Chronic [ ] Hypoxic  [ ] Hypercarbic [ ] Other  [ ] Other organ failure     LABS:    Non new labs to review.         RADIOLOGY & ADDITIONAL STUDIES:  < from: Xray Chest 1 View AP/PA (03.30.19 @ 07:01) >  EXAM:  XR CHEST AP OR PA 1V                            PROCEDURE DATE:  03/30/2019  IMPRESSION:   Bilateral medial lung opacities which may represent atelectasis and/or   pneumonia.                TRINIDAD BLACKBURN M.D., RADIOLOGY RESIDENT  This document has been electronically signed.  ANDREZ WRIGHT M.D., ATTENDING RADIOLOGIST  This document has been electronically signed. Mar 30 2019  8:03AM        < end of copied text >    PROTEIN CALORIE MALNUTRITION PRESENT: [ ] Yes [ ] No  [ ] PPSV2 < or = to 30% [ ] significant weight loss  [ ] poor nutritional intake [ ] catabolic state [ ] anasarca     Albumin, Serum: 3.4 g/dL (03-30-19 @ 07:05)  Artificial Nutrition [ ]     REFERRALS:   [ ]Chaplaincy  [ ] Hospice  [ ]Child Life  [ ]Social Work  [ ]Case management [ ]Holistic Therapy   Goals of Care Discussion Document: Goals of Care Conversation - Personal Advance Directive [NANCI Chinchilla] (03-30-19 @ 07:50)

## 2019-04-11 NOTE — SWALLOW VFSS/MBS ASSESSMENT ADULT - ROSENBEK'S PENETRATION ASPIRATION SCALE
Based on limited visibility PAS at least 5; aspiration can not be r/o Given limited acceptance of PO trials and intermittently obscured view; laryngeal penetration or aspiration were not evident. (2) contrast enters airway, remains above the vocal cords, no residue remains (penetration) deep laryngeal penetration did not appear to be completely retrieved

## 2019-04-11 NOTE — PROGRESS NOTE ADULT - PROBLEM SELECTOR PROBLEM 1
Aspiration pneumonia of both lower lobes, unspecified aspiration pneumonia type
Essential hypertension
Pneumonia of both lungs due to infectious organism, unspecified part of lung
Essential hypertension

## 2019-04-11 NOTE — PROGRESS NOTE ADULT - PROBLEM SELECTOR PLAN 1
ID FU noted, cont abx till 4/6.   DC planning for rehab subsequently
ID FU noted, cont abx till 4/6. DC planning for rehab subsequently
Monitor on current medications
Monitor on current medications
On Vanco and zosyn, suspect gram negative organisms. Speech and swallow testing may not help if family does not want PEG.. Dysphagia diet advised
Stable
Stable.
Stable.
Suspect aspiration. Family does not want PEG. Started on dysphagia diet. May need GOC discussion. PT benita called as well to assess functional status and ability to return to assisted living facility. ID benita noted
Suspect aspiration. Family does not want PEG. Started on dysphagia diet. May need GOC discussion. PT benita called as well to assess functional status and ability to return to assisted living facility.
Stable.

## 2019-04-11 NOTE — PROGRESS NOTE ADULT - PROBLEM SELECTOR PROBLEM 3
Atrial fibrillation, unspecified type
Delirium due to another medical condition
Essential hypertension

## 2019-04-11 NOTE — PROGRESS NOTE ADULT - ATTENDING COMMENTS
Discharge planning to [facility] facility.
Discharge planning to [facility] facility.
Discharge planning to [facility] living facility.?
Discharge planning to [facility] facility.
Discharge planning to [facility] living facility.?
Discharge planning to [facility] living facility.?

## 2019-04-11 NOTE — PROGRESS NOTE ADULT - SUBJECTIVE AND OBJECTIVE BOX
Patient is a 89y old  Female who presents with a chief complaint of cough and sob (31 Mar 2019 16:27)      SUBJECTIVE / OVERNIGHT EVENTS:  No new symptosm.She has signs of dysphagia. Swallowing evaluation shows aspiration risk    Review of Systems:   CONSTITUTIONAL: No fever, weight loss, or fatigue  EYES: No eye pain, visual disturbances, or discharge  ENMT:  No difficulty hearing, tinnitus, vertigo; No sinus or throat pain  NECK: No pain or stiffness  BREASTS: No pain, masses, or nipple discharge  RESPIRATORY: No cough, wheezing, chills or hemoptysis; No shortness of breath  CARDIOVASCULAR: No chest pain, palpitations, dizziness, or leg swelling  GASTROINTESTINAL: No abdominal or epigastric pain. No nausea, vomiting, or hematemesis; No diarrhea or constipation. No melena or hematochezia.  GENITOURINARY: No dysuria, frequency, hematuria, or incontinence  NEUROLOGICAL: No headaches, memory loss, loss of strength, numbness, or tremors  SKIN: No itching, burning, rashes, or lesions   LYMPH NODES: No enlarged glands  ENDOCRINE: No heat or cold intolerance; No hair loss  MUSCULOSKELETAL: No joint pain or swelling; No muscle, back, or extremity pain  PSYCHIATRIC: No depression, anxiety, mood swings, or difficulty sleeping  HEME/LYMPH: No easy bruising, or bleeding gums  ALLERY AND IMMUNOLOGIC: No hives or eczema    MEDICATIONS  (STANDING):  amLODIPine   Tablet 5 milliGRAM(s) Oral daily  clopidogrel Tablet 75 milliGRAM(s) Oral daily  digoxin     Tablet 0.125 milliGRAM(s) Oral daily  enalapril 2.5 milliGRAM(s) Oral daily  escitalopram 10 milliGRAM(s) Oral daily  guaiFENesin/dextromethorphan  Syrup 10 milliLiter(s) Oral every 4 hours  heparin  Injectable 5000 Unit(s) SubCutaneous every 12 hours  latanoprost 0.005% Ophthalmic Solution 1 Drop(s) Both EYES at bedtime  metoprolol tartrate 25 milliGRAM(s) Oral every 12 hours  pantoprazole    Tablet 40 milliGRAM(s) Oral before breakfast  piperacillin/tazobactam IVPB. 3.375 Gram(s) IV Intermittent every 8 hours  timolol 0.5% Solution 1 Drop(s) Left EYE daily  vancomycin  IVPB 1000 milliGRAM(s) IV Intermittent every 12 hours    MEDICATIONS  (PRN):  acetaminophen   Tablet .. 650 milliGRAM(s) Oral every 6 hours PRN Temp greater or equal to 38C (100.4F), Moderate Pain (4 - 6)  haloperidol    Concentrate 0.5 milliGRAM(s) Oral every 12 hours PRN Agitation      PHYSICAL EXAM:  Vital Signs Last 24 Hrs  T(C): 36.5 (01 Apr 2019 04:38), Max: 36.6 (31 Mar 2019 14:30)  T(F): 97.7 (01 Apr 2019 04:38), Max: 97.9 (31 Mar 2019 14:30)  HR: 70 (01 Apr 2019 04:38) (59 - 70)  BP: 123/77 (01 Apr 2019 04:38) (123/77 - 146/79)  BP(mean): --  RR: 18 (01 Apr 2019 04:38) (18 - 18)  SpO2: 92% (01 Apr 2019 04:38) (90% - 94%)  I&O's Summary    31 Mar 2019 07:01  -  01 Apr 2019 07:00  --------------------------------------------------------  IN: 1610 mL / OUT: 0 mL / NET: 1610 mL      GENERAL: NAD, well-developed  HEAD:  Atraumatic, Normocephalic  EYES: EOMI, PERRLA, conjunctiva and sclera clear  NECK: Supple, No JVD  CHEST/LUNG: Clear to auscultation bilaterally; No wheeze  HEART: Regular rate and rhythm; No murmurs, rubs, or gallops  ABDOMEN: Soft, Nontender, Nondistended; Bowel sounds present  EXTREMITIES:  2+ Peripheral Pulses, No clubbing, cyanosis, or edema  PSYCH: AAOx3  NEUROLOGY: non-focal  SKIN: No rashes or lesions    LABS:  CAPILLARY BLOOD GLUCOSE                              13.7   11.08 )-----------( 259      ( 01 Apr 2019 09:35 )             42.6     04-01    142  |  103  |  10  ----------------------------<  106<H>  3.5   |  25  |  0.58    Ca    8.9      01 Apr 2019 06:15                RADIOLOGY & ADDITIONAL TESTS:    Imaging Personally Reviewed:    Consultant(s) Notes Reviewed:      Care Discussed with Consultants/Other Providers:

## 2019-04-11 NOTE — SWALLOW VFSS/MBS ASSESSMENT ADULT - RECOMMENDED FEEDING/EATING TECHNIQUES
crush medication (when feasible)/provide rest periods between swallows/*Aforementioned is recommended if MD places patient on pleasure feeds/allow for swallow between intakes/position upright (90 degrees)/maintain upright posture during/after eating for 30 mins/small sips/bites

## 2019-04-11 NOTE — CONSULT NOTE ADULT - PROBLEM SELECTOR RECOMMENDATION 9
The HCP (patient's daughter, Mahi) indicated she is a retired psych nurse. She talked to me on the phone while also having her friend Leona (retired ICU nurse) on the speaker. Mahi indicated she understand a feeding tube will not changes the risks for aspiration and that it will not probably changes overall outcomes. She indicated she understands PO diet will also have risks of aspiration; however, understanding the patient's advanced illness that dysphagia diet should be continued.   Continue aspiration precautions.

## 2019-04-11 NOTE — PROGRESS NOTE ADULT - PROBLEM SELECTOR PLAN 3
Monitor on current medications
Clinically stable. Not on anticoagulation.
Clinically stable. Not on anticoagulation..
Clinically stable. Not on anticoagulation..
Monitor on current medications
Monitor on current medications.
Monitor on current medications.
Prob due to metabolic toxic encephaopathy from pneumonia and suspected UTI

## 2019-04-11 NOTE — SWALLOW VFSS/MBS ASSESSMENT ADULT - ADDITIONAL INFORMATION
Pt's shoulder intermittently obscures view of the laryngeal vestibule; Pt only accepted small tspn amounts with max encouragement.  Pt c/o nausea following the exam although emesis not evident; Nsg informed.

## 2019-04-11 NOTE — PROGRESS NOTE ADULT - ASSESSMENT
89 YOF PMHx of Dementia, HTN, HLD, CAD, Afib, p/w cough and sob from [facility]. Pt denies having any symptoms and is upset that she was sent here. Pt appears confused and is not coherently answering questions. Per accompanying paperwork pt has a worsening productive cough and has appeared to have laboured breathing. Sent in for evaluation.    In the ED, Chest X Ray reveals mid lobe infiltrate. (30 Mar 2019 18:09)    ER vss.   Afebrile.  WBC 12.9.  Lact 2.2.  RVP (+) entero/rhinovirus.  CXR with b/l medial lung opacities.  Pt on vanco/zosyn for pna.   Pt has HCP/daughter (Mahi Kirkpatrick) who lives in [place]  - pt is DNR/I.  Reported known h/o of aspiration and confirms that comfort feeds were elected on past admissions in setting of likely recurrent aspiration.   HCP declined swallow eval as per chart notes.    ID consult called for further abx managment.        Recommend:    - Pt with viral URI with entero/rhinovirus.  Cont supportive care, no role for antivirals.    - Pt also with dysphagia, and is on comfort feeds as per chart notes and d/w HCP.  s/p trt of asp pna, completed on 4/6.  pt at risk for continued aspiration.  Pt failed speech and swallow eval.  Awaiting placement          * Cont to monitor off abx.  d/c planning as per primary team.  No acute ID issues at this time. Cont to monitor for new fever      (Dr. Mohsena Amin covering 4/12 through 4/15.  [phone])

## 2019-04-11 NOTE — SWALLOW VFSS/MBS ASSESSMENT ADULT - SLP PERTINENT HISTORY OF CURRENT PROBLEM
89 YOF PMHx of Dementia, HTN, HLD, CAD, Afib, p/w cough and sob from [facility]. Pt denies having any symptoms and is upset that she was sent here. Pt appears confused and is not coherently answering questions. Per accompanying paperwork pt has a worsening productive cough and has appeared to have laboured breathing. Sent in for evaluation.  In the ED, Chest X Ray reveals mid lobe infiltrate. (30 Mar 2019 18:09)  3/31/19 Per MD:  Aspiration pneumonia of both lower lobes, unspecified aspiration pneumonia type.  Plan: On Vanco and zosyn, suspect gram negative organisms. Speech and swallow testing may not help if family does not want PEG.. Dysphagia diet advised.

## 2019-04-11 NOTE — CONSULT NOTE ADULT - PROBLEM SELECTOR RECOMMENDATION 2
FAST 7c  Patient  is eligible for home hospice.  I will not recommend cholinesterase inhibitors at this time.   On Haldol PRN as per primary team.

## 2019-04-11 NOTE — SWALLOW VFSS/MBS ASSESSMENT ADULT - ORAL PREPARATORY PHASE
reduced acceptance reduced acceptance of PO trials impaired bolus formation mildly impaired mastication impaired mastication

## 2019-04-11 NOTE — SWALLOW VFSS/MBS ASSESSMENT ADULT - ORAL PHASE
Reduced anterior - posterior transport/Residue in oral cavity/Uncontrolled bolus / spillover in chrissy-pharynx/Delayed oral transit time Residue in oral cavity/Reduced anterior - posterior transport/Uncontrolled bolus / spillover in chrissy-pharynx/Delayed oral transit time Reduced anterior - posterior transport/Delayed oral transit time Delayed oral transit time Delayed oral transit time/Reduced anterior - posterior transport Delayed oral transit time/Uncontrolled bolus / spillover in chrissy-pharynx/Laryngeal penetration before swallow - silent/Reduced anterior - posterior transport/Incomplete tongue to palate contact Incomplete tongue to palate contact/Delayed oral transit time/Reduced anterior - posterior transport/Uncontrolled bolus / spillover in chrissy-pharynx/Uncontrolled bolus / spillover in hypopharynx Uncontrolled bolus / spillover in chrissy-pharynx/Laryngeal penetration before swallow - silent/Delayed oral transit time/Reduced anterior - posterior transport/Incomplete tongue to palate contact/Uncontrolled bolus / spillover in hypopharynx Uncontrolled bolus / spillover in chrissy-pharynx/Reduced anterior - posterior transport/mild oral residue/Delayed oral transit time/Residue in oral cavity Delayed oral transit time/Residue in oral cavity/Uncontrolled bolus / spillover in hypopharynx/Reduced anterior - posterior transport/Incomplete tongue to palate contact/Uncontrolled bolus / spillover in chrissy-pharynx

## 2019-04-11 NOTE — SWALLOW VFSS/MBS ASSESSMENT ADULT - MODE OF PRESENTATION
spoon/fed by clinician fed by clinician/spoon cup/Pt refused tspn but agreed to self administration of cup sips fed by clinician

## 2019-04-11 NOTE — SWALLOW VFSS/MBS ASSESSMENT ADULT - RECOMMENDED CONSISTENCY
Dietary decision deferred to MD based upon the results of this examination and MD f/u for GOC; however, would suggest Palliative Care consult to determine the GOC with re: to provision of nutrition in this patient with dementia.

## 2019-04-11 NOTE — PROGRESS NOTE ADULT - PROBLEM SELECTOR PROBLEM 2
Atrial fibrillation, unspecified type
Pneumonia of both lungs due to infectious organism, unspecified part of lung
Atrial fibrillation, unspecified type
Pneumonia of both lungs due to infectious organism, unspecified part of lung

## 2019-04-11 NOTE — CHART NOTE - NSCHARTNOTEFT_GEN_A_CORE
follow up- informed by dysphagia eval team after MBS, pt did poorly and recommendation is NPO, but as the pt family wish advised  pleasure feeds-advised dysphagia 1 with nectar thick liquid with spoon feed/aspiration precaution; d/w pt daughter Mahi who understands the risk of aspiration and wants to continue with po feeds; informed by , PAT can't take pt with dysphagia diet/risk of aspiration;  d/w Dr. Brambila- called for palliative consult/GOC discussion;  d/w -option for other choices for discharge plan after d/ pt/family.  Adri Segura(NP)  [facility], [phone]

## 2019-04-11 NOTE — PROGRESS NOTE ADULT - PROBLEM SELECTOR PLAN 2
Pron from aspirtation. GOC discussion/ Palliative consult
Abx course completed
Abx course completed
Clinically stable. Not on anticoagulation, may need to reconsider.
Clinically stable. Not on anticoagulation.
Clinically stable. Not on anticoagulation..
HR stable. Not on A/C. Check stool for OB
Hypoxic sec to Pneumonia and suspected chronic diastolic heart failure due to CAD and chronic atrial fibrillation  . DC planning since she has completed the course of abx.
Hypoxic sec to Pneumonia and suspected chronic diastolic heart failure due to CAD and chronic atrial fibrillation  Swallowing eval done, sugegstive of silent aspiration. If family not interested in PEG, and in favor of pleasure feeds, then DC on pleasure feeds
Hypoxic sec to Pneumonia. DC planning since she has completed the course of abx.
Clinically stable. Not on anticoagulation, may need to reconsider

## 2019-04-11 NOTE — SWALLOW VFSS/MBS ASSESSMENT ADULT - DIAGNOSTIC IMPRESSIONS
Pt presents with oropharyngeal dysphagia superimposed upon by underlying cognitive deficits.  Acceptance of food and liquids was limited.  Formation control, and transfer of bolus were deemed to be impaired.  Based upon limited PO trials with intermittent decreased visualization of the laryngeal vestibule, laryngeal penetration was evident and aspiration can not be ruled out.  Pharyngeal residue resulted in laryngeal penetration post swallow primarily on honey thickened liquids and mechanical soft texture.  Disorders include: ; reduced lingual strength/ROM/Rate of motion, reduced BOT to posterior pharyngeal wall contact, delay in trigger of the swallow reflex, reduced hyo-laryngeal excursion, reduced laryngeal closure, reduced pharyngeal contractility, reduced supraglottic sensation, reduced subglottic sensation. Of note, laryngeal penetration appeared to be similar with tspn of honey thickened liquids and sips of thin liquids, however amount of penetrated material appeared to decrease (although penetration not eliminated) with tspn of nectar thickened liquids. Pt presents with oropharyngeal dysphagia superimposed upon by underlying cognitive deficits.  Acceptance of food and liquids was limited.  Formation control, and transfer of bolus were deemed to be impaired.  Based upon limited PO trials with intermittent decreased visualization of the laryngeal vestibule, laryngeal penetration was evident and aspiration can not be ruled out.  Pharyngeal residue resulted in laryngeal penetration post swallow primarily on honey thickened liquids and mechanical soft texture.  Disorders include: ; reduced lingual strength/ROM/Rate of motion, reduced BOT to posterior pharyngeal wall contact, reduced hyo-laryngeal excursion, reduced laryngeal closure, and reduced pharyngeal contractility. Of note, laryngeal penetration appeared to be similar with tspn of honey thickened liquids and sips of thin liquids, however amount of penetrated material appeared to decrease (although penetration not eliminated) with tspn of nectar thickened liquids.

## 2019-04-12 NOTE — PROGRESS NOTE ADULT - PROVIDER SPECIALTY LIST ADULT
Hospitalist
Infectious Disease
Internal Medicine

## 2019-04-12 NOTE — PROGRESS NOTE ADULT - SUBJECTIVE AND OBJECTIVE BOX
Patient is a 89y old  Female who presents with a chief complaint of cough and shortness of breath. (11 Apr 2019 16:16)      INTERVAL HPI/OVERNIGHT EVENTS:  T(C): 36.6 (04-12-19 @ 11:00), Max: 36.6 (04-12-19 @ 11:00)  HR: 64 (04-12-19 @ 11:00) (62 - 64)  BP: 118/62 (04-12-19 @ 11:00) (118/62 - 154/78)  RR: 18 (04-12-19 @ 11:00) (18 - 18)  SpO2: 96% (04-12-19 @ 11:00) (94% - 96%)  Wt(kg): --  I&O's Summary    11 Apr 2019 07:01  -  12 Apr 2019 07:00  --------------------------------------------------------  IN: 680 mL / OUT: 0 mL / NET: 680 mL    12 Apr 2019 07:01  -  12 Apr 2019 18:15  --------------------------------------------------------  IN: 240 mL / OUT: 0 mL / NET: 240 mL        LABS:                        13.0   9.11  )-----------( 390      ( 12 Apr 2019 09:00 )             40.8     04-12    143  |  104  |  14  ----------------------------<  97  3.4<L>   |  26  |  0.72    Ca    8.9      12 Apr 2019 06:58  Mg     2.0     04-12          CAPILLARY BLOOD GLUCOSE                MEDICATIONS  (STANDING):  amLODIPine   Tablet 5 milliGRAM(s) Oral daily  clopidogrel Tablet 75 milliGRAM(s) Oral daily  digoxin     Tablet 0.125 milliGRAM(s) Oral daily  enalapril 2.5 milliGRAM(s) Oral daily  escitalopram 10 milliGRAM(s) Oral daily  guaiFENesin/dextromethorphan  Syrup 10 milliLiter(s) Oral every 4 hours  heparin  Injectable 5000 Unit(s) SubCutaneous every 12 hours  latanoprost 0.005% Ophthalmic Solution 1 Drop(s) Both EYES at bedtime  metoprolol tartrate 25 milliGRAM(s) Oral every 12 hours  pantoprazole    Tablet 40 milliGRAM(s) Oral before breakfast  polyethylene glycol 3350 17 Gram(s) Oral daily  senna 2 Tablet(s) Oral at bedtime  timolol 0.5% Solution 1 Drop(s) Left EYE daily    MEDICATIONS  (PRN):  acetaminophen   Tablet .. 650 milliGRAM(s) Oral every 6 hours PRN Temp greater or equal to 38C (100.4F), Moderate Pain (4 - 6)  haloperidol    Concentrate 0.5 milliGRAM(s) Oral every 12 hours PRN Agitation          PHYSICAL EXAM:  GENERAL: NAD, well-groomed, well-developed  HEAD:  Atraumatic, Normocephalic  CHEST/LUNG: Clear to percussion bilaterally; No rales, rhonchi, wheezing, or rubs  HEART: Regular rate and rhythm; No murmurs, rubs, or gallops  ABDOMEN: Soft, Nontender, Nondistended; Bowel sounds present  EXTREMITIES:  2+ Peripheral Pulses, No clubbing, cyanosis, or edema  LYMPH: No lymphadenopathy noted  SKIN: No rashes or lesions    Care Discussed with Consultants/Other Providers [ ] YES  [ ] NO

## 2019-04-12 NOTE — PROGRESS NOTE ADULT - ASSESSMENT
Problem/Plan - 1:    ·  Problem: Essential hypertension.  Plan: Stable.     Problem/Plan - 2:  ·  Problem: Pneumonia of both lungs due to infectious organism, unspecified part of lung.  Plan: Pron from aspirtation. [facility] discussion/ Palliative consult.     Problem/Plan - 3:    ·  Problem: Atrial fibrillation, unspecified type.  Plan: Clinically stable. Not on anticoagulation.

## 2019-04-29 NOTE — ED ADULT NURSE NOTE - NSFALLRSKHARMRISK_ED_ALL_ED
Pre-screening questions for Radiology Injections:    Injection to be done at which interventional clinic site? [facility]    Procedure ordered by Dr. Bhardwaj    Procedure ordered? Lumbar Medial Branch Block    What insurance would patient like us to bill for this procedure? Medicare, BCBS      Worker's comp- Any injection DO NOT SCHEDULE and route to Loan Johnsonn.      [employer] insurance - If scheduling an SI joint injection DO NOT SCHEDULE and route to Moni Nathanael.     [employer]- MBB's must be scheduled at LEAST two weeks apart      [employer] - Any injection besides hip/shoulder/knee joint DO NOT SCHEDULE and route to Moni Huffman. She will obtain PA and call pt back to schedule procedure or notify pt of denial.     [employer]- PA required for all MBB's    Any chance of pregnancy? NO   If YES, do NOT schedule and route to RN pool    Is an  needed? No     Patient has a drive home? (mandatory) Yes     Is patient taking any blood thinners (plavix, coumadin, jantoven, warfarin, heparin, pradaxa or dabigatran )? Yes - Eloquis - no hold required per grid    If hold needed, do NOT schedule, route to RN pool     Is patient taking any aspirin products? No     If more than 325mg/day do NOT schedule; route to RN pool     For CERVICAL procedures, hold all aspirin products for 6 days.      Does the patient have a bleeding or clotting disorder? No    If YES, okay to schedule AND route to RN nurse pool    **For any patients with platelet count <100, must be forwarded to provider**    Is patient diabetic? YES If YES, have them bring their glucometer.    Does patient have an active infection or treated for one within the past week? No    Is patient currently taking any antibiotics?  No    For patients on chronic, preventative, or prophylacti antibiotics, procedures can be scheduled.     For patients on antibiotics for active or recent infection:    Eliseo Rangel [place], [place],  [place]-antibiotic course must have been completed for 4 days     Dr. Mendoza-antibiotic course must have been completed for 7 days    Is patient currently taking any steroid medications? (i.e. Prednisone, Medrol)  No     For patients on steroid medications:    Mike Rangel [place], [place]-steroid course must have been completed for 4 days    Dr. Mendoza-steroid course must have been completed for 7 days    Review with patient:  If you are started on any steroids or antibiotics between now and your appointment, you must contact us because it may affect our ability to perform your procedure INFORMED    Is patient actively being treated for cancer or immunocompromised? No   If YES, do NOT schedule and route to RN    Are you able to get on and off an exam table with minimal or no assistance? Yes   If NO, do NOT schedule and route to RN    Are you able to roll over and lay on your stomach with minimal or no assistance? No - Patient says they can lay on side, not on stomach   If NO, do NOT schedule and route to RN    Any allergies to contrast dye, iodine, shellfish, or numbing and steroid medications? No  (If so, inform nursing and note in scheduling comments.)    Allergies: Penicillins; Ambien [zolpidem tartrate]; Definity; Sulfa drugs; Cymbalta; and Fluconazole     Has the patient had a flu shot or any other vaccinations within 7 days before or after the procedure.  No     Does patient have an MRI/CT?  YES: CT  (SI joint, hip injections, lumbar sympathetic blocks, and stellate ganglion blocks do not require an MRI)    Was the MRI done w/in the last 3 years?  Yes    Was MRI done at [facility]? Yes      If not, where was it done? N/A       If MRI was not done at [facility], [facility] do NOT schedule and route to nursing.  If pt has an imaging disc, the injection may be scheduled but pt has to bring disc to appt. If they show up w/out disc the injection cannot be done    **Must be scheduled with elapsed  time interval of at least 2 weeks and not more than 6 months between the First MBB and the Second MBB**       Medial Branch Block Pre-Procedure Instructions    It is okay to take long acting pain medications (if you are on them) the day of the procedure but try not to take any short acting medications unless absolutely necessary. INFORMED        Long acting meds would include: Gabapentin (Neurontin), MS Contin, Oxycontin        Short acting meds would include:  Percocet, Oxycodone, Vicodin, Ibuprofen     The day of the procedure, you should try to do things that provoke your pain, since the injection is being done to see if it will relieve your pain . INFORMED    If your pain level is a 4 out of 10 or less on the day of the procedure, please call [phone] to reschedule.  INFORMED  Reminders (please tell patient if applicable):      Instructed pt to arrive 30 minutes early for IV start if this is for a cervical procedure, ALL sympathetic (stellate ganglion, hypogastric, or lumbar sympathetic block) and all sedation procedures (RFA, spinal cord stimulation trials). N/A        -IVs are not routinely placed for Dr. Hoffman cervical cases        -Dr. Bhardwaj: no IV needed for CMBB       If NPO for sedation, it is okay to take medications with sips of water (except if they are to hold blood thinners). N/A   *DO take blood pressure medication if it is prescribed*      If this is for a cervical MBB aspirin needs to be held for 6 days.  N/A      Do not schedule procedures requiring IV placement in the first appointment after lunch N/A      For patients 85 or older we recommend having an adult stay w/ them for the remainder of the day.      Does the patient have any questions? NO     yes

## 2019-04-29 NOTE — ED ADULT TRIAGE NOTE - CHIEF COMPLAINT QUOTE
She is here to see if she has a knee fracture. History of fall in the past but is having increased pain as per daughter with swelling.

## 2019-04-29 NOTE — ED PROVIDER NOTE - PHYSICAL EXAMINATION
AAOx3  Extremity: +mild erythema of left knee with swelling and mild TTP. FROM of b/l knees and hips   good pedal pulses   no neurovascular deficits on exam   Neuro: patient moving all extremity equally,

## 2019-04-29 NOTE — ED ADULT NURSE NOTE - ED COMFORT CARE
Patient informed
14.1   9.68  )-----------( 343      ( 29 Jul 2019 17:45 )             40.4     07-29    136  |  101  |  9   ----------------------------<  84  5.1   |  22  |  0.22    Ca    10.7<H>      29 Jul 2019 17:45  Phos  6.3     07-29  Mg     1.9     07-29    TPro  5.7<L>  /  Alb  3.8  /  TBili  6.4<H>  /  DBili  x   /  AST  44<H>  /  ALT  19  /  AlkPhos  204  07-29 7/29 Abdominal ultrasound: Impression small volume right upper quadrant ascites and incidental note of left renal pelviectasis.    7/29 Abdominal X-ray: showed nonspecific bowel gas pattern and large stool burden.

## 2019-04-29 NOTE — ED PROVIDER NOTE - ATTENDING CONTRIBUTION TO CARE
Kate with [facility]. Patient with poss patella fracture. no trauma reported. sent for xray for assessment. exam with mild swelling and tenderness to palpation. Skin intact. ROM intact. Daughter states she has severe arthritis and this is not new. Plan for xray to reassess. I performed a face to face bedside interview with patient regarding history of present illness, review of symptoms and past medical history. I completed an independent physical exam.  I have discussed the patient's plan of care with Physician Assistant (PA). I agree with note as stated above, having amended the EMR as needed to reflect my findings.   This includes History of Present Illness, HIV, Past Medical/Surgical/Family/Social History, Allergies and Home Medications, Review of Systems, Physical Exam, and any Progress Notes during the time I functioned as the attending physician for this patient.

## 2019-04-29 NOTE — ED ADULT NURSE NOTE - NSIMPLEMENTINTERV_GEN_ALL_ED
Implemented All Fall with Harm Risk Interventions:  [place] to call system. Call bell, personal items and telephone within reach. Instruct patient to call for assistance. Room bathroom lighting operational. Non-slip footwear when patient is off stretcher. Physically safe environment: no spills, clutter or unnecessary equipment. Stretcher in lowest position, wheels locked, appropriate side rails in place. Provide visual cue, wrist band, yellow gown, etc. Monitor gait and stability. Monitor for mental status changes and reorient to person, place, and time. Review medications for side effects contributing to fall risk. Reinforce activity limits and safety measures with patient and family. Provide visual clues: red socks.

## 2019-04-29 NOTE — ED PROVIDER NOTE - NSFOLLOWUPINSTRUCTIONS_ED_ALL_ED_FT
Rest, Ice 15 min on/ 15 min off and Elevate  to help with pain    Take Tylenol as needed for pain     Return for worsening symptoms

## 2019-04-29 NOTE — ED PROVIDER NOTE - OBJECTIVE STATEMENT
89 y/o F with PMH of afib, CAD, HLD, HTN, severe OA was BIB EMS from [facility] for evaluation of possible left patellar fracture. Patient had xray yesterday by [facility], which could not r/o patellar fracture. Patient denies trauma/fall, motor/sensory deficits, f/c or all other complaints. Per patients daughter she is in PT at the NH, does not know if she injured herself during PT.

## 2019-05-23 NOTE — PROGRESS NOTE ADULT - PROBLEM SELECTOR PROBLEM 5
Dementia without behavioral disturbance, unspecified dementia type Atrial fibrillation, unspecified type [place] child and family guidance

## 2019-09-27 NOTE — PHYSICAL THERAPY INITIAL EVALUATION ADULT - PLANNED THERAPY INTERVENTIONS, PT EVAL
Patient:   ISMAEL EARL            MRN: [id number]            FIN: [id number]              Age:   74 years     Sex:  MALE     :  45   Associated Diagnoses:   None   Author:   GORDO BRITTON     Subjective   Additional Info.       History of Present Illness   NOTES Patient seen and examined lying in bed.  No acute events overnight. states that Radiation sessions seem to be going well. No expressive aphasia noted during conversation with pt. GCS 14-15 (E4V4-5,M6). Denies any nausea, vomiting,  headache, visual changes/disturbances,  dizziness, new numbness, tingling, or weakness. No CP, SOB, or dyspnea..       Review of Systems   Constitutional:  Negative except as documented in history of present illness.   Eye:  Negative except as documented in history of present illness.   Ear/Nose/Mouth/Throat:  Negative except as documented in history of present illness.   Cardiovascular:  Negative except as documented in history of present illness.   Respiratory:  Negative except as documented in history of present illness.   Gastrointestinal:  Negative except as documented in history of present illness.   Genitourinary:  Negative except as documented in history of present illness.   Musculoskeletal:  Negative except as documented in history of present illness.   Integumentary:  Negative except as documented in history of present illness.   Hematology/Lymphatics:  Negative except as documented in history of present illness.   Neurologic:  Negative except as documented in history of present illness.   Endocrine:  Negative except as documented in history of present illness.   Allergy/Immunologic:  Negative except as documented in history of present illness.   Psychiatric:  Negative except as documented in history of present illness.      Physical Examination   VS/Measurements     Vitals between:   2019 09:31:01   TO   2019 09:31:01                   LAST RESULT MINIMUM MAXIMUM  Temperature 36.5  35.9 36.5  Heart Rate 53 46 58  Respiratory Rate 12 12 23  NISBP           150 111 150  NIDBP           86 67 86  NIMBP           107 85 107  SpO2                    95 95 98    Intake and Output   I&O 24 hr   I & O between:  21-JUL-2019 09:31 TO 22-JUL-2019 09:31  Med Dosing Weight:  136.2  kg   16-JUL-2019  24 Hour Intake:   1625.33  ( 11.93 mL/kg )  24 Hour Output:   1075.00           24 Hour Urine/Stool Output:   0.0  24 Hour Balance:   550.33           24 Hour Urine Output:   1075.00  ( 0.33 mL/kg/hr )                   Urine Count:  5.00    Stool Count:  4.00         General:  Alert & oriented x3 GCS 14-15 (E4, V4-5, M6)  CN II-XII intact   PERRL, EOMI  Strength 5/5 to all extremies  SILT Bilaterallly V123, UE LE  No Delong's sign  No Pronator Drift  No dysmetria  No ankle clonus noted   Abdomen soft, non-tender to palpation  Calves soft to palplation.    Cognition and Speech:  Speech clear and coherent, Functional cognition intact.   Psychiatric:  Cooperative, Appropriate mood & affect.      Review / Management   Laboratory results:     Labs between:  21-JUL-2019 09:31 to 22-JUL-2019 09:31  POC GLU:                 Latest Result  Latest Date  Minimum  Min Date  Maximum  Max Date                             (H) 120  22-JUL-2019 (H) 120  22-JUL-2019 (H) 181  21-JUL-2019                 .    Lines and Tubes:    LINES  Implanted Port PowerPort Right, Subclavian   Charted: 07/22/19 08:00  Accessed: 07/22/19  Port Usage: Fluid/Blood Products   .      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)  Lung cancer with liver and osseous metastases   CVA   AAA   Arthritis   Bronchitis   COPD w/ Home O2   BRENT   Chronic pain   Diabetes   HTN   Hyperlipidemia   Hearing loss in right ear   Umbilical hernia   Lumbar stenosis   Sciatic leg pain  Plan:  WBXRT started 7/17;  tolerating treatments; Next session today; remains Neurologically intact, w/ expressive aphasia improved  No repeat CT scan unless patient has neuro deficits/clinical deterioration  Will continue to monitor for possible emergent EVD placement  Continue Decadron per hem/onc  Maintain SBP <160  Neuro checks Q2H  PT/OT- recommending rehab (vs. home with 24hr assist based on pt/family preference)  No AP/NSAIDs for now  Appreciate medicine management  Dispo: CCU stepdown  Will discuss with Neurosurgery team  .     .  Please note pt seen and examined earlier this am on rounds  No acute events  Sitting up in chair, verbalizing no complaints.  No headaches.  Struggles with short term memory although wife feels this is improving  Strength intact, FUNG x4  Prior MRI imaging reviewed w/ Dr. Olivares  Continue WBXRT, dexa  Will follow  Pt. seen and evaluated with attending neurosurgeon Dr. Olivares, who approved the treatment plan outlined above   transfer training/gait training/balance training/bed mobility training

## 2019-11-08 NOTE — H&P ADULT - BACK
Anticoagulation Clinic - Remote Progress Note  [facility]  Frequency of Monitorin-4x a month    Indication: A.fib, VHD  Referring Provider: Jenniffer Madrid 19)  Initial Warfarin Start Date:    Goal INR: 2.5-3.5  Current Drug Interactions: ensure (once daily); amitriptyline (PRN)  CHADS-VASc: 2 (age, HTN)     Diet: avoids typically 10/28/19  Alcohol: 12 pack of beer/week  Tobacco: None  OTC Pain Medication: None     INR History:  Date 8/25 9/11 9/29 10/19 11/5 12/2 12/11 1/4/18 1/30 2/19   Total Weekly Dose 24mg 24mg 24mg 22.75 mg? 22.75 mg? ? 22.5mg 22.5mg 22.5mg 22.5mg   INR 3.3 2.8 2.9 3.1 3.5 2.7 2.7 2.6 2.9 2.5   Notes    LVM LVM   call       Date 3/7 4/5 4/18 5/20 6/5 6/27 7/24 7/31 9/9 10/8   Total Weekly Dose 22.5mg 22.5mg 22.5mg 23mg 22.5mg  23mg 24mg 24mg unable   to verify   INR 3.3 3.7 2.6 2.8 2.9 2.4 2.9 3.3 3.4 3.0   Notes LVM       rec'vd   rec'vd 10/13;  mult calls     Date 11/8 12/14 2/20/19 2/25 3/11 3/25 4/9 5/1 5/15 6/11   Total Weekly Dose unable to verify unable to verify unable to verify 23mg 23mg 23mg 22.5mg 23mg 22.5mg 23mg   INR 2.7 2.6 3.8 3.4 3.6 3.0 3.2 3.3 2.5 3.1   Notes               Date 6/27 7/10 7/26 8/9 9/11 10/3 10/15 10/24 10/31 11/8   Total Weekly Dose  22.5mg 22.5mg 22.5mg 23mg 22.5mg 23mg 22.5mg 23mg 23 mg   INR 3.6 2.9 2.3 2.9 2.9 2.8 3.3 3.2 2.7 2.3   Notes  reported       reported 10/25; sick; cefdinir stop cefdinir 10/29      Date             Total Weekly Dose 24 mg            INR             Notes               Phone Interview:  Tablet Strength: 3mg and 1mg tablets  Patient Contact Info:[phone] (cell)  (disconnected ) will set up VM; [phone]    Preferred *[phone]* anytime after noon  Other numbers on his profile:   [phone] (Home)   - brother in law, requested that we do NOT call this number  Lab Contact Info: [place]    - Called patient on all numbers available. No numbers had voicemail    Patient Findings  Positives:   Change in diet/appetite   Negatives:  Signs/symptoms of thrombosis, Signs/symptoms of bleeding, Laboratory test error suspected, Change in health, Change in alcohol use, Change in activity, Upcoming invasive procedure, Emergency department visit, Upcoming dental procedure, Missed doses, Extra doses, Change in medications, Hospital admission, Bruising, Other complaints   Comments:  Mr. Turner denies any changes in medication, missed doses and no signs/symptoms of bleeding. Mr. Turner mentioned that his diet has been getting better since being sick recently. He recently completed a course of cefdinir.      Plan:   1. INR was subtherapeutic today at 2.3. Discussed with Mr. Turner on 11/8 to increase dose tonight 4 mg and continue current regimen of alternating 3.5mg and 3mg doses.   2. Repeat INR in 1 week, 11/14. Patient has been eating better after being sick recently may need adjust regimen next week if low again.  3. Verbal information provided over the phone. Patient RBV dosing instructions, expresses understanding by teach back, and has no further questions at this time.      Tesfaye Agrawal, PharmD  PGY1 Resident  11/8/2019  8:05 AM     No deformity or limitation of movement

## 2020-01-01 ENCOUNTER — INPATIENT (INPATIENT)
Facility: HOSPITAL | Age: 85
LOS: 12 days | DRG: 871 | End: 2020-02-02
Attending: INTERNAL MEDICINE | Admitting: INTERNAL MEDICINE
Payer: MEDICARE

## 2020-01-01 VITALS
HEART RATE: 100 BPM | TEMPERATURE: 98 F | OXYGEN SATURATION: 97 % | DIASTOLIC BLOOD PRESSURE: 79 MMHG | RESPIRATION RATE: 20 BRPM | SYSTOLIC BLOOD PRESSURE: 143 MMHG

## 2020-01-01 VITALS
DIASTOLIC BLOOD PRESSURE: 105 MMHG | HEART RATE: 81 BPM | OXYGEN SATURATION: 88 % | SYSTOLIC BLOOD PRESSURE: 196 MMHG | RESPIRATION RATE: 30 BRPM

## 2020-01-01 DIAGNOSIS — J96.01 ACUTE RESPIRATORY FAILURE WITH HYPOXIA: ICD-10-CM

## 2020-01-01 DIAGNOSIS — Z71.89 OTHER SPECIFIED COUNSELING: ICD-10-CM

## 2020-01-01 DIAGNOSIS — B34.9 VIRAL INFECTION, UNSPECIFIED: ICD-10-CM

## 2020-01-01 DIAGNOSIS — Z51.5 ENCOUNTER FOR PALLIATIVE CARE: ICD-10-CM

## 2020-01-01 DIAGNOSIS — R53.2 FUNCTIONAL QUADRIPLEGIA: ICD-10-CM

## 2020-01-01 DIAGNOSIS — I10 ESSENTIAL (PRIMARY) HYPERTENSION: ICD-10-CM

## 2020-01-01 DIAGNOSIS — N39.0 URINARY TRACT INFECTION, SITE NOT SPECIFIED: ICD-10-CM

## 2020-01-01 DIAGNOSIS — F03.90 UNSPECIFIED DEMENTIA WITHOUT BEHAVIORAL DISTURBANCE: ICD-10-CM

## 2020-01-01 LAB
-  AMIKACIN: SIGNIFICANT CHANGE UP
-  AMPICILLIN/SULBACTAM: SIGNIFICANT CHANGE UP
-  AMPICILLIN: SIGNIFICANT CHANGE UP
-  AZTREONAM: SIGNIFICANT CHANGE UP
-  CEFAZOLIN: SIGNIFICANT CHANGE UP
-  CEFEPIME: SIGNIFICANT CHANGE UP
-  CEFOXITIN: SIGNIFICANT CHANGE UP
-  CEFTRIAXONE: SIGNIFICANT CHANGE UP
-  CIPROFLOXACIN: SIGNIFICANT CHANGE UP
-  GENTAMICIN: SIGNIFICANT CHANGE UP
-  IMIPENEM: SIGNIFICANT CHANGE UP
-  LEVOFLOXACIN: SIGNIFICANT CHANGE UP
-  MEROPENEM: SIGNIFICANT CHANGE UP
-  NITROFURANTOIN: SIGNIFICANT CHANGE UP
-  PIPERACILLIN/TAZOBACTAM: SIGNIFICANT CHANGE UP
-  TIGECYCLINE: SIGNIFICANT CHANGE UP
-  TOBRAMYCIN: SIGNIFICANT CHANGE UP
-  TRIMETHOPRIM/SULFAMETHOXAZOLE: SIGNIFICANT CHANGE UP
ALBUMIN SERPL ELPH-MCNC: 3.1 G/DL — LOW (ref 3.3–5)
ALBUMIN SERPL ELPH-MCNC: 3.7 G/DL — SIGNIFICANT CHANGE UP (ref 3.3–5)
ALP SERPL-CCNC: 101 U/L — SIGNIFICANT CHANGE UP (ref 40–120)
ALP SERPL-CCNC: 66 U/L — SIGNIFICANT CHANGE UP (ref 40–120)
ALT FLD-CCNC: 15 U/L — SIGNIFICANT CHANGE UP (ref 10–45)
ALT FLD-CCNC: 16 U/L — SIGNIFICANT CHANGE UP (ref 10–45)
ANION GAP SERPL CALC-SCNC: 12 MMOL/L — SIGNIFICANT CHANGE UP (ref 5–17)
ANION GAP SERPL CALC-SCNC: 13 MMOL/L — SIGNIFICANT CHANGE UP (ref 5–17)
ANION GAP SERPL CALC-SCNC: 14 MMOL/L — SIGNIFICANT CHANGE UP (ref 5–17)
ANION GAP SERPL CALC-SCNC: 15 MMOL/L — SIGNIFICANT CHANGE UP (ref 5–17)
ANION GAP SERPL CALC-SCNC: 15 MMOL/L — SIGNIFICANT CHANGE UP (ref 5–17)
APPEARANCE UR: ABNORMAL
APTT BLD: 31.8 SEC — SIGNIFICANT CHANGE UP (ref 27.5–36.3)
AST SERPL-CCNC: 19 U/L — SIGNIFICANT CHANGE UP (ref 10–40)
AST SERPL-CCNC: 55 U/L — HIGH (ref 10–40)
BACTERIA # UR AUTO: ABNORMAL
BASE EXCESS BLDA CALC-SCNC: 0.8 MMOL/L — SIGNIFICANT CHANGE UP (ref -2–2)
BASE EXCESS BLDA CALC-SCNC: 3.8 MMOL/L — HIGH (ref -2–2)
BASE EXCESS BLDV CALC-SCNC: 0 MMOL/L — SIGNIFICANT CHANGE UP (ref -2–2)
BASOPHILS # BLD AUTO: 0.02 K/UL — SIGNIFICANT CHANGE UP (ref 0–0.2)
BASOPHILS # BLD AUTO: 0.02 K/UL — SIGNIFICANT CHANGE UP (ref 0–0.2)
BASOPHILS # BLD AUTO: 0.03 K/UL — SIGNIFICANT CHANGE UP (ref 0–0.2)
BASOPHILS # BLD AUTO: 0.07 K/UL — SIGNIFICANT CHANGE UP (ref 0–0.2)
BASOPHILS # BLD AUTO: 0.07 K/UL — SIGNIFICANT CHANGE UP (ref 0–0.2)
BASOPHILS NFR BLD AUTO: 0.1 % — SIGNIFICANT CHANGE UP (ref 0–2)
BASOPHILS NFR BLD AUTO: 0.2 % — SIGNIFICANT CHANGE UP (ref 0–2)
BASOPHILS NFR BLD AUTO: 0.2 % — SIGNIFICANT CHANGE UP (ref 0–2)
BASOPHILS NFR BLD AUTO: 0.4 % — SIGNIFICANT CHANGE UP (ref 0–2)
BASOPHILS NFR BLD AUTO: 0.5 % — SIGNIFICANT CHANGE UP (ref 0–2)
BILIRUB SERPL-MCNC: 0.4 MG/DL — SIGNIFICANT CHANGE UP (ref 0.2–1.2)
BILIRUB SERPL-MCNC: 0.7 MG/DL — SIGNIFICANT CHANGE UP (ref 0.2–1.2)
BILIRUB UR-MCNC: NEGATIVE — SIGNIFICANT CHANGE UP
BUN SERPL-MCNC: 15 MG/DL — SIGNIFICANT CHANGE UP (ref 7–23)
BUN SERPL-MCNC: 16 MG/DL — SIGNIFICANT CHANGE UP (ref 7–23)
BUN SERPL-MCNC: 21 MG/DL — SIGNIFICANT CHANGE UP (ref 7–23)
BUN SERPL-MCNC: 22 MG/DL — SIGNIFICANT CHANGE UP (ref 7–23)
BUN SERPL-MCNC: 23 MG/DL — SIGNIFICANT CHANGE UP (ref 7–23)
BUN SERPL-MCNC: 23 MG/DL — SIGNIFICANT CHANGE UP (ref 7–23)
BUN SERPL-MCNC: 27 MG/DL — HIGH (ref 7–23)
BUN SERPL-MCNC: 29 MG/DL — HIGH (ref 7–23)
BUN SERPL-MCNC: 30 MG/DL — HIGH (ref 7–23)
BUN SERPL-MCNC: 41 MG/DL — HIGH (ref 7–23)
CA-I SERPL-SCNC: 1.04 MMOL/L — LOW (ref 1.12–1.3)
CALCIUM SERPL-MCNC: 8.4 MG/DL — SIGNIFICANT CHANGE UP (ref 8.4–10.5)
CALCIUM SERPL-MCNC: 8.5 MG/DL — SIGNIFICANT CHANGE UP (ref 8.4–10.5)
CALCIUM SERPL-MCNC: 8.5 MG/DL — SIGNIFICANT CHANGE UP (ref 8.4–10.5)
CALCIUM SERPL-MCNC: 8.7 MG/DL — SIGNIFICANT CHANGE UP (ref 8.4–10.5)
CALCIUM SERPL-MCNC: 8.8 MG/DL — SIGNIFICANT CHANGE UP (ref 8.4–10.5)
CALCIUM SERPL-MCNC: 8.9 MG/DL — SIGNIFICANT CHANGE UP (ref 8.4–10.5)
CALCIUM SERPL-MCNC: 8.9 MG/DL — SIGNIFICANT CHANGE UP (ref 8.4–10.5)
CALCIUM SERPL-MCNC: 9 MG/DL — SIGNIFICANT CHANGE UP (ref 8.4–10.5)
CALCIUM SERPL-MCNC: 9.1 MG/DL — SIGNIFICANT CHANGE UP (ref 8.4–10.5)
CALCIUM SERPL-MCNC: 9.2 MG/DL — SIGNIFICANT CHANGE UP (ref 8.4–10.5)
CHLORIDE BLDV-SCNC: 105 MMOL/L — SIGNIFICANT CHANGE UP (ref 96–108)
CHLORIDE SERPL-SCNC: 102 MMOL/L — SIGNIFICANT CHANGE UP (ref 96–108)
CHLORIDE SERPL-SCNC: 103 MMOL/L — SIGNIFICANT CHANGE UP (ref 96–108)
CHLORIDE SERPL-SCNC: 105 MMOL/L — SIGNIFICANT CHANGE UP (ref 96–108)
CHLORIDE SERPL-SCNC: 107 MMOL/L — SIGNIFICANT CHANGE UP (ref 96–108)
CHLORIDE SERPL-SCNC: 108 MMOL/L — SIGNIFICANT CHANGE UP (ref 96–108)
CHLORIDE SERPL-SCNC: 108 MMOL/L — SIGNIFICANT CHANGE UP (ref 96–108)
CHLORIDE SERPL-SCNC: 109 MMOL/L — HIGH (ref 96–108)
CHLORIDE SERPL-SCNC: 110 MMOL/L — HIGH (ref 96–108)
CHLORIDE SERPL-SCNC: 112 MMOL/L — HIGH (ref 96–108)
CHLORIDE SERPL-SCNC: 114 MMOL/L — HIGH (ref 96–108)
CO2 BLDA-SCNC: 24 MMOL/L — SIGNIFICANT CHANGE UP (ref 22–30)
CO2 BLDA-SCNC: 27 MMOL/L — SIGNIFICANT CHANGE UP (ref 22–30)
CO2 BLDV-SCNC: 29 MMOL/L — SIGNIFICANT CHANGE UP (ref 22–30)
CO2 SERPL-SCNC: 21 MMOL/L — LOW (ref 22–31)
CO2 SERPL-SCNC: 22 MMOL/L — SIGNIFICANT CHANGE UP (ref 22–31)
CO2 SERPL-SCNC: 23 MMOL/L — SIGNIFICANT CHANGE UP (ref 22–31)
CO2 SERPL-SCNC: 24 MMOL/L — SIGNIFICANT CHANGE UP (ref 22–31)
CO2 SERPL-SCNC: 25 MMOL/L — SIGNIFICANT CHANGE UP (ref 22–31)
COLOR SPEC: YELLOW — SIGNIFICANT CHANGE UP
CREAT SERPL-MCNC: 0.49 MG/DL — LOW (ref 0.5–1.3)
CREAT SERPL-MCNC: 0.5 MG/DL — SIGNIFICANT CHANGE UP (ref 0.5–1.3)
CREAT SERPL-MCNC: 0.52 MG/DL — SIGNIFICANT CHANGE UP (ref 0.5–1.3)
CREAT SERPL-MCNC: 0.53 MG/DL — SIGNIFICANT CHANGE UP (ref 0.5–1.3)
CREAT SERPL-MCNC: 0.54 MG/DL — SIGNIFICANT CHANGE UP (ref 0.5–1.3)
CREAT SERPL-MCNC: 0.59 MG/DL — SIGNIFICANT CHANGE UP (ref 0.5–1.3)
CREAT SERPL-MCNC: 0.6 MG/DL — SIGNIFICANT CHANGE UP (ref 0.5–1.3)
CREAT SERPL-MCNC: 0.63 MG/DL — SIGNIFICANT CHANGE UP (ref 0.5–1.3)
CULTURE RESULTS: SIGNIFICANT CHANGE UP
DIFF PNL FLD: ABNORMAL
EOSINOPHIL # BLD AUTO: 0 K/UL — SIGNIFICANT CHANGE UP (ref 0–0.5)
EOSINOPHIL # BLD AUTO: 0 K/UL — SIGNIFICANT CHANGE UP (ref 0–0.5)
EOSINOPHIL # BLD AUTO: 0.11 K/UL — SIGNIFICANT CHANGE UP (ref 0–0.5)
EOSINOPHIL # BLD AUTO: 0.17 K/UL — SIGNIFICANT CHANGE UP (ref 0–0.5)
EOSINOPHIL # BLD AUTO: 0.2 K/UL — SIGNIFICANT CHANGE UP (ref 0–0.5)
EOSINOPHIL NFR BLD AUTO: 0 % — SIGNIFICANT CHANGE UP (ref 0–6)
EOSINOPHIL NFR BLD AUTO: 0 % — SIGNIFICANT CHANGE UP (ref 0–6)
EOSINOPHIL NFR BLD AUTO: 0.7 % — SIGNIFICANT CHANGE UP (ref 0–6)
EOSINOPHIL NFR BLD AUTO: 1 % — SIGNIFICANT CHANGE UP (ref 0–6)
EOSINOPHIL NFR BLD AUTO: 1.1 % — SIGNIFICANT CHANGE UP (ref 0–6)
EPI CELLS # UR: 3 — SIGNIFICANT CHANGE UP
GAS PNL BLDA: SIGNIFICANT CHANGE UP
GAS PNL BLDV: 131 MMOL/L — LOW (ref 135–145)
GAS PNL BLDV: SIGNIFICANT CHANGE UP
GLUCOSE BLDC GLUCOMTR-MCNC: 102 MG/DL — HIGH (ref 70–99)
GLUCOSE BLDC GLUCOMTR-MCNC: 105 MG/DL — HIGH (ref 70–99)
GLUCOSE BLDC GLUCOMTR-MCNC: 105 MG/DL — HIGH (ref 70–99)
GLUCOSE BLDC GLUCOMTR-MCNC: 109 MG/DL — HIGH (ref 70–99)
GLUCOSE BLDC GLUCOMTR-MCNC: 109 MG/DL — HIGH (ref 70–99)
GLUCOSE BLDC GLUCOMTR-MCNC: 110 MG/DL — HIGH (ref 70–99)
GLUCOSE BLDC GLUCOMTR-MCNC: 111 MG/DL — HIGH (ref 70–99)
GLUCOSE BLDC GLUCOMTR-MCNC: 123 MG/DL — HIGH (ref 70–99)
GLUCOSE BLDC GLUCOMTR-MCNC: 131 MG/DL — HIGH (ref 70–99)
GLUCOSE BLDC GLUCOMTR-MCNC: 134 MG/DL — HIGH (ref 70–99)
GLUCOSE BLDC GLUCOMTR-MCNC: 157 MG/DL — HIGH (ref 70–99)
GLUCOSE BLDC GLUCOMTR-MCNC: 95 MG/DL — SIGNIFICANT CHANGE UP (ref 70–99)
GLUCOSE BLDV-MCNC: 114 MG/DL — HIGH (ref 70–99)
GLUCOSE SERPL-MCNC: 100 MG/DL — HIGH (ref 70–99)
GLUCOSE SERPL-MCNC: 108 MG/DL — HIGH (ref 70–99)
GLUCOSE SERPL-MCNC: 111 MG/DL — HIGH (ref 70–99)
GLUCOSE SERPL-MCNC: 119 MG/DL — HIGH (ref 70–99)
GLUCOSE SERPL-MCNC: 120 MG/DL — HIGH (ref 70–99)
GLUCOSE SERPL-MCNC: 126 MG/DL — HIGH (ref 70–99)
GLUCOSE SERPL-MCNC: 157 MG/DL — HIGH (ref 70–99)
GLUCOSE SERPL-MCNC: 161 MG/DL — HIGH (ref 70–99)
GLUCOSE SERPL-MCNC: 167 MG/DL — HIGH (ref 70–99)
GLUCOSE SERPL-MCNC: 81 MG/DL — SIGNIFICANT CHANGE UP (ref 70–99)
GLUCOSE UR QL: NEGATIVE — SIGNIFICANT CHANGE UP
HCO3 BLDA-SCNC: 23 MMOL/L — SIGNIFICANT CHANGE UP (ref 21–29)
HCO3 BLDA-SCNC: 26 MMOL/L — SIGNIFICANT CHANGE UP (ref 21–29)
HCO3 BLDV-SCNC: 27 MMOL/L — SIGNIFICANT CHANGE UP (ref 21–29)
HCOV PNL SPEC NAA+PROBE: DETECTED
HCT VFR BLD CALC: 44.4 % — SIGNIFICANT CHANGE UP (ref 34.5–45)
HCT VFR BLD CALC: 45.2 % — HIGH (ref 34.5–45)
HCT VFR BLD CALC: 45.8 % — HIGH (ref 34.5–45)
HCT VFR BLD CALC: 46.3 % — HIGH (ref 34.5–45)
HCT VFR BLD CALC: 49.4 % — HIGH (ref 34.5–45)
HCT VFR BLD CALC: 49.5 % — HIGH (ref 34.5–45)
HCT VFR BLD CALC: 50.3 % — HIGH (ref 34.5–45)
HCT VFR BLD CALC: 53 % — HIGH (ref 34.5–45)
HCT VFR BLD CALC: 53.1 % — HIGH (ref 34.5–45)
HCT VFR BLD CALC: 53.5 % — HIGH (ref 34.5–45)
HCT VFR BLDA CALC: 53 % — HIGH (ref 39–50)
HGB BLD CALC-MCNC: 17.4 G/DL — HIGH (ref 11.5–15.5)
HGB BLD-MCNC: 14.3 G/DL — SIGNIFICANT CHANGE UP (ref 11.5–15.5)
HGB BLD-MCNC: 14.4 G/DL — SIGNIFICANT CHANGE UP (ref 11.5–15.5)
HGB BLD-MCNC: 14.7 G/DL — SIGNIFICANT CHANGE UP (ref 11.5–15.5)
HGB BLD-MCNC: 14.7 G/DL — SIGNIFICANT CHANGE UP (ref 11.5–15.5)
HGB BLD-MCNC: 15.4 G/DL — SIGNIFICANT CHANGE UP (ref 11.5–15.5)
HGB BLD-MCNC: 15.6 G/DL — HIGH (ref 11.5–15.5)
HGB BLD-MCNC: 15.9 G/DL — HIGH (ref 11.5–15.5)
HGB BLD-MCNC: 16.1 G/DL — HIGH (ref 11.5–15.5)
HGB BLD-MCNC: 16.5 G/DL — HIGH (ref 11.5–15.5)
HGB BLD-MCNC: 16.8 G/DL — HIGH (ref 11.5–15.5)
HOROWITZ INDEX BLDA+IHG-RTO: 21 — SIGNIFICANT CHANGE UP
HOROWITZ INDEX BLDA+IHG-RTO: 21 — SIGNIFICANT CHANGE UP
HYALINE CASTS # UR AUTO: 0 /LPF — SIGNIFICANT CHANGE UP (ref 0–7)
IMM GRANULOCYTES NFR BLD AUTO: 0.5 % — SIGNIFICANT CHANGE UP (ref 0–1.5)
IMM GRANULOCYTES NFR BLD AUTO: 0.8 % — SIGNIFICANT CHANGE UP (ref 0–1.5)
IMM GRANULOCYTES NFR BLD AUTO: 0.9 % — SIGNIFICANT CHANGE UP (ref 0–1.5)
INR BLD: 1.14 RATIO — SIGNIFICANT CHANGE UP (ref 0.88–1.16)
KETONES UR-MCNC: NEGATIVE — SIGNIFICANT CHANGE UP
LACTATE BLDV-MCNC: 3 MMOL/L — HIGH (ref 0.7–2)
LEUKOCYTE ESTERASE UR-ACNC: ABNORMAL
LYMPHOCYTES # BLD AUTO: 0.6 K/UL — LOW (ref 1–3.3)
LYMPHOCYTES # BLD AUTO: 0.91 K/UL — LOW (ref 1–3.3)
LYMPHOCYTES # BLD AUTO: 0.97 K/UL — LOW (ref 1–3.3)
LYMPHOCYTES # BLD AUTO: 1 K/UL — SIGNIFICANT CHANGE UP (ref 1–3.3)
LYMPHOCYTES # BLD AUTO: 1.38 K/UL — SIGNIFICANT CHANGE UP (ref 1–3.3)
LYMPHOCYTES # BLD AUTO: 5.2 % — LOW (ref 13–44)
LYMPHOCYTES # BLD AUTO: 5.4 % — LOW (ref 13–44)
LYMPHOCYTES # BLD AUTO: 6 % — LOW (ref 13–44)
LYMPHOCYTES # BLD AUTO: 6.3 % — LOW (ref 13–44)
LYMPHOCYTES # BLD AUTO: 8.2 % — LOW (ref 13–44)
MAGNESIUM SERPL-MCNC: 2 MG/DL — SIGNIFICANT CHANGE UP (ref 1.6–2.6)
MAGNESIUM SERPL-MCNC: 2 MG/DL — SIGNIFICANT CHANGE UP (ref 1.6–2.6)
MAGNESIUM SERPL-MCNC: 2.2 MG/DL — SIGNIFICANT CHANGE UP (ref 1.6–2.6)
MCHC RBC-ENTMCNC: 28.1 PG — SIGNIFICANT CHANGE UP (ref 27–34)
MCHC RBC-ENTMCNC: 28.3 PG — SIGNIFICANT CHANGE UP (ref 27–34)
MCHC RBC-ENTMCNC: 28.4 PG — SIGNIFICANT CHANGE UP (ref 27–34)
MCHC RBC-ENTMCNC: 28.5 PG — SIGNIFICANT CHANGE UP (ref 27–34)
MCHC RBC-ENTMCNC: 28.6 PG — SIGNIFICANT CHANGE UP (ref 27–34)
MCHC RBC-ENTMCNC: 28.9 PG — SIGNIFICANT CHANGE UP (ref 27–34)
MCHC RBC-ENTMCNC: 28.9 PG — SIGNIFICANT CHANGE UP (ref 27–34)
MCHC RBC-ENTMCNC: 29 PG — SIGNIFICANT CHANGE UP (ref 27–34)
MCHC RBC-ENTMCNC: 29.1 PG — SIGNIFICANT CHANGE UP (ref 27–34)
MCHC RBC-ENTMCNC: 29.5 PG — SIGNIFICANT CHANGE UP (ref 27–34)
MCHC RBC-ENTMCNC: 30.3 GM/DL — LOW (ref 32–36)
MCHC RBC-ENTMCNC: 30.8 GM/DL — LOW (ref 32–36)
MCHC RBC-ENTMCNC: 31 GM/DL — LOW (ref 32–36)
MCHC RBC-ENTMCNC: 31.1 GM/DL — LOW (ref 32–36)
MCHC RBC-ENTMCNC: 31.2 GM/DL — LOW (ref 32–36)
MCHC RBC-ENTMCNC: 31.7 GM/DL — LOW (ref 32–36)
MCHC RBC-ENTMCNC: 31.7 GM/DL — LOW (ref 32–36)
MCHC RBC-ENTMCNC: 31.9 GM/DL — LOW (ref 32–36)
MCHC RBC-ENTMCNC: 32.2 GM/DL — SIGNIFICANT CHANGE UP (ref 32–36)
MCHC RBC-ENTMCNC: 33.1 GM/DL — SIGNIFICANT CHANGE UP (ref 32–36)
MCV RBC AUTO: 89.2 FL — SIGNIFICANT CHANGE UP (ref 80–100)
MCV RBC AUTO: 89.2 FL — SIGNIFICANT CHANGE UP (ref 80–100)
MCV RBC AUTO: 90.1 FL — SIGNIFICANT CHANGE UP (ref 80–100)
MCV RBC AUTO: 91 FL — SIGNIFICANT CHANGE UP (ref 80–100)
MCV RBC AUTO: 91.1 FL — SIGNIFICANT CHANGE UP (ref 80–100)
MCV RBC AUTO: 91.7 FL — SIGNIFICANT CHANGE UP (ref 80–100)
MCV RBC AUTO: 92 FL — SIGNIFICANT CHANGE UP (ref 80–100)
MCV RBC AUTO: 92.5 FL — SIGNIFICANT CHANGE UP (ref 80–100)
MCV RBC AUTO: 92.8 FL — SIGNIFICANT CHANGE UP (ref 80–100)
MCV RBC AUTO: 92.9 FL — SIGNIFICANT CHANGE UP (ref 80–100)
METHOD TYPE: SIGNIFICANT CHANGE UP
MONOCYTES # BLD AUTO: 0.3 K/UL — SIGNIFICANT CHANGE UP (ref 0–0.9)
MONOCYTES # BLD AUTO: 0.46 K/UL — SIGNIFICANT CHANGE UP (ref 0–0.9)
MONOCYTES # BLD AUTO: 0.92 K/UL — HIGH (ref 0–0.9)
MONOCYTES # BLD AUTO: 0.97 K/UL — HIGH (ref 0–0.9)
MONOCYTES # BLD AUTO: 1.32 K/UL — HIGH (ref 0–0.9)
MONOCYTES NFR BLD AUTO: 2.6 % — SIGNIFICANT CHANGE UP (ref 2–14)
MONOCYTES NFR BLD AUTO: 3 % — SIGNIFICANT CHANGE UP (ref 2–14)
MONOCYTES NFR BLD AUTO: 5.2 % — SIGNIFICANT CHANGE UP (ref 2–14)
MONOCYTES NFR BLD AUTO: 6.1 % — SIGNIFICANT CHANGE UP (ref 2–14)
MONOCYTES NFR BLD AUTO: 7.8 % — SIGNIFICANT CHANGE UP (ref 2–14)
NEUTROPHILS # BLD AUTO: 10.56 K/UL — HIGH (ref 1.8–7.4)
NEUTROPHILS # BLD AUTO: 13.03 K/UL — HIGH (ref 1.8–7.4)
NEUTROPHILS # BLD AUTO: 13.81 K/UL — HIGH (ref 1.8–7.4)
NEUTROPHILS # BLD AUTO: 13.98 K/UL — HIGH (ref 1.8–7.4)
NEUTROPHILS # BLD AUTO: 16.11 K/UL — HIGH (ref 1.8–7.4)
NEUTROPHILS NFR BLD AUTO: 81.8 % — HIGH (ref 43–77)
NEUTROPHILS NFR BLD AUTO: 86.2 % — HIGH (ref 43–77)
NEUTROPHILS NFR BLD AUTO: 87.2 % — HIGH (ref 43–77)
NEUTROPHILS NFR BLD AUTO: 90.1 % — HIGH (ref 43–77)
NEUTROPHILS NFR BLD AUTO: 91.5 % — HIGH (ref 43–77)
NITRITE UR-MCNC: NEGATIVE — SIGNIFICANT CHANGE UP
NRBC # BLD: 0 /100 WBCS — SIGNIFICANT CHANGE UP (ref 0–0)
NT-PROBNP SERPL-SCNC: 1389 PG/ML — HIGH (ref 0–300)
ORGANISM # SPEC MICROSCOPIC CNT: SIGNIFICANT CHANGE UP
ORGANISM # SPEC MICROSCOPIC CNT: SIGNIFICANT CHANGE UP
PCO2 BLDA: 31 MMHG — LOW (ref 32–46)
PCO2 BLDA: 32 MMHG — SIGNIFICANT CHANGE UP (ref 32–46)
PCO2 BLDV: 55 MMHG — HIGH (ref 35–50)
PH BLDA: 7.48 — HIGH (ref 7.35–7.45)
PH BLDA: 7.52 — HIGH (ref 7.35–7.45)
PH BLDV: 7.32 — LOW (ref 7.35–7.45)
PH UR: 6.5 — SIGNIFICANT CHANGE UP (ref 5–8)
PHOSPHATE SERPL-MCNC: 2.9 MG/DL — SIGNIFICANT CHANGE UP (ref 2.5–4.5)
PLATELET # BLD AUTO: 244 K/UL — SIGNIFICANT CHANGE UP (ref 150–400)
PLATELET # BLD AUTO: 290 K/UL — SIGNIFICANT CHANGE UP (ref 150–400)
PLATELET # BLD AUTO: 293 K/UL — SIGNIFICANT CHANGE UP (ref 150–400)
PLATELET # BLD AUTO: 310 K/UL — SIGNIFICANT CHANGE UP (ref 150–400)
PLATELET # BLD AUTO: 312 K/UL — SIGNIFICANT CHANGE UP (ref 150–400)
PLATELET # BLD AUTO: 312 K/UL — SIGNIFICANT CHANGE UP (ref 150–400)
PLATELET # BLD AUTO: 317 K/UL — SIGNIFICANT CHANGE UP (ref 150–400)
PLATELET # BLD AUTO: 322 K/UL — SIGNIFICANT CHANGE UP (ref 150–400)
PLATELET # BLD AUTO: 348 K/UL — SIGNIFICANT CHANGE UP (ref 150–400)
PLATELET # BLD AUTO: 356 K/UL — SIGNIFICANT CHANGE UP (ref 150–400)
PO2 BLDA: 56 MMHG — LOW (ref 74–108)
PO2 BLDA: 68 MMHG — LOW (ref 74–108)
PO2 BLDV: <20 MMHG — LOW (ref 25–45)
POTASSIUM BLDV-SCNC: 12.9 MMOL/L — CRITICAL HIGH (ref 3.5–5.3)
POTASSIUM SERPL-MCNC: 3.1 MMOL/L — LOW (ref 3.5–5.3)
POTASSIUM SERPL-MCNC: 3.1 MMOL/L — LOW (ref 3.5–5.3)
POTASSIUM SERPL-MCNC: 3.3 MMOL/L — LOW (ref 3.5–5.3)
POTASSIUM SERPL-MCNC: 3.5 MMOL/L — SIGNIFICANT CHANGE UP (ref 3.5–5.3)
POTASSIUM SERPL-MCNC: 3.5 MMOL/L — SIGNIFICANT CHANGE UP (ref 3.5–5.3)
POTASSIUM SERPL-MCNC: 3.6 MMOL/L — SIGNIFICANT CHANGE UP (ref 3.5–5.3)
POTASSIUM SERPL-MCNC: 3.7 MMOL/L — SIGNIFICANT CHANGE UP (ref 3.5–5.3)
POTASSIUM SERPL-MCNC: 3.8 MMOL/L — SIGNIFICANT CHANGE UP (ref 3.5–5.3)
POTASSIUM SERPL-MCNC: 4.1 MMOL/L — SIGNIFICANT CHANGE UP (ref 3.5–5.3)
POTASSIUM SERPL-MCNC: 4.4 MMOL/L — SIGNIFICANT CHANGE UP (ref 3.5–5.3)
POTASSIUM SERPL-MCNC: 6.3 MMOL/L — CRITICAL HIGH (ref 3.5–5.3)
POTASSIUM SERPL-SCNC: 3.1 MMOL/L — LOW (ref 3.5–5.3)
POTASSIUM SERPL-SCNC: 3.1 MMOL/L — LOW (ref 3.5–5.3)
POTASSIUM SERPL-SCNC: 3.3 MMOL/L — LOW (ref 3.5–5.3)
POTASSIUM SERPL-SCNC: 3.5 MMOL/L — SIGNIFICANT CHANGE UP (ref 3.5–5.3)
POTASSIUM SERPL-SCNC: 3.5 MMOL/L — SIGNIFICANT CHANGE UP (ref 3.5–5.3)
POTASSIUM SERPL-SCNC: 3.6 MMOL/L — SIGNIFICANT CHANGE UP (ref 3.5–5.3)
POTASSIUM SERPL-SCNC: 3.7 MMOL/L — SIGNIFICANT CHANGE UP (ref 3.5–5.3)
POTASSIUM SERPL-SCNC: 3.8 MMOL/L — SIGNIFICANT CHANGE UP (ref 3.5–5.3)
POTASSIUM SERPL-SCNC: 4.1 MMOL/L — SIGNIFICANT CHANGE UP (ref 3.5–5.3)
POTASSIUM SERPL-SCNC: 4.4 MMOL/L — SIGNIFICANT CHANGE UP (ref 3.5–5.3)
POTASSIUM SERPL-SCNC: 6.3 MMOL/L — CRITICAL HIGH (ref 3.5–5.3)
PROCALCITONIN SERPL-MCNC: 0.05 NG/ML — SIGNIFICANT CHANGE UP (ref 0.02–0.1)
PROT SERPL-MCNC: 6.4 G/DL — SIGNIFICANT CHANGE UP (ref 6–8.3)
PROT SERPL-MCNC: 8 G/DL — SIGNIFICANT CHANGE UP (ref 6–8.3)
PROT UR-MCNC: ABNORMAL
PROTHROM AB SERPL-ACNC: 13.2 SEC — HIGH (ref 10–12.9)
RAPID RVP RESULT: DETECTED
RBC # BLD: 4.95 M/UL — SIGNIFICANT CHANGE UP (ref 3.8–5.2)
RBC # BLD: 4.98 M/UL — SIGNIFICANT CHANGE UP (ref 3.8–5.2)
RBC # BLD: 5.05 M/UL — SIGNIFICANT CHANGE UP (ref 3.8–5.2)
RBC # BLD: 5.07 M/UL — SIGNIFICANT CHANGE UP (ref 3.8–5.2)
RBC # BLD: 5.44 M/UL — HIGH (ref 3.8–5.2)
RBC # BLD: 5.47 M/UL — HIGH (ref 3.8–5.2)
RBC # BLD: 5.48 M/UL — HIGH (ref 3.8–5.2)
RBC # BLD: 5.72 M/UL — HIGH (ref 3.8–5.2)
RBC # BLD: 5.76 M/UL — HIGH (ref 3.8–5.2)
RBC # BLD: 5.82 M/UL — HIGH (ref 3.8–5.2)
RBC # FLD: 13.9 % — SIGNIFICANT CHANGE UP (ref 10.3–14.5)
RBC # FLD: 14 % — SIGNIFICANT CHANGE UP (ref 10.3–14.5)
RBC # FLD: 14.1 % — SIGNIFICANT CHANGE UP (ref 10.3–14.5)
RBC # FLD: 14.1 % — SIGNIFICANT CHANGE UP (ref 10.3–14.5)
RBC # FLD: 14.2 % — SIGNIFICANT CHANGE UP (ref 10.3–14.5)
RBC # FLD: 14.2 % — SIGNIFICANT CHANGE UP (ref 10.3–14.5)
RBC # FLD: 14.3 % — SIGNIFICANT CHANGE UP (ref 10.3–14.5)
RBC # FLD: 14.3 % — SIGNIFICANT CHANGE UP (ref 10.3–14.5)
RBC # FLD: 14.4 % — SIGNIFICANT CHANGE UP (ref 10.3–14.5)
RBC # FLD: 14.6 % — HIGH (ref 10.3–14.5)
RBC CASTS # UR COMP ASSIST: 18 /HPF — HIGH (ref 0–4)
RSV RNA SPEC QL NAA+PROBE: DETECTED
SAO2 % BLDA: 89 % — LOW (ref 92–96)
SAO2 % BLDA: 91 % — LOW (ref 92–96)
SAO2 % BLDV: 23 % — LOW (ref 67–88)
SODIUM SERPL-SCNC: 139 MMOL/L — SIGNIFICANT CHANGE UP (ref 135–145)
SODIUM SERPL-SCNC: 139 MMOL/L — SIGNIFICANT CHANGE UP (ref 135–145)
SODIUM SERPL-SCNC: 142 MMOL/L — SIGNIFICANT CHANGE UP (ref 135–145)
SODIUM SERPL-SCNC: 144 MMOL/L — SIGNIFICANT CHANGE UP (ref 135–145)
SODIUM SERPL-SCNC: 144 MMOL/L — SIGNIFICANT CHANGE UP (ref 135–145)
SODIUM SERPL-SCNC: 145 MMOL/L — SIGNIFICANT CHANGE UP (ref 135–145)
SODIUM SERPL-SCNC: 149 MMOL/L — HIGH (ref 135–145)
SODIUM SERPL-SCNC: 149 MMOL/L — HIGH (ref 135–145)
SP GR SPEC: 1.02 — SIGNIFICANT CHANGE UP (ref 1.01–1.02)
SPECIMEN SOURCE: SIGNIFICANT CHANGE UP
TROPONIN T, HIGH SENSITIVITY RESULT: 19 NG/L — SIGNIFICANT CHANGE UP (ref 0–51)
UROBILINOGEN FLD QL: NEGATIVE — SIGNIFICANT CHANGE UP
WBC # BLD: 11.54 K/UL — HIGH (ref 3.8–10.5)
WBC # BLD: 12.03 K/UL — HIGH (ref 3.8–10.5)
WBC # BLD: 12.92 K/UL — HIGH (ref 3.8–10.5)
WBC # BLD: 15.11 K/UL — HIGH (ref 3.8–10.5)
WBC # BLD: 15.51 K/UL — HIGH (ref 3.8–10.5)
WBC # BLD: 16.88 K/UL — HIGH (ref 3.8–10.5)
WBC # BLD: 18.3 K/UL — HIGH (ref 3.8–10.5)
WBC # BLD: 18.48 K/UL — HIGH (ref 3.8–10.5)
WBC # BLD: 20.71 K/UL — HIGH (ref 3.8–10.5)
WBC # BLD: 22.5 K/UL — HIGH (ref 3.8–10.5)
WBC # FLD AUTO: 11.54 K/UL — HIGH (ref 3.8–10.5)
WBC # FLD AUTO: 12.03 K/UL — HIGH (ref 3.8–10.5)
WBC # FLD AUTO: 12.92 K/UL — HIGH (ref 3.8–10.5)
WBC # FLD AUTO: 15.11 K/UL — HIGH (ref 3.8–10.5)
WBC # FLD AUTO: 15.51 K/UL — HIGH (ref 3.8–10.5)
WBC # FLD AUTO: 16.88 K/UL — HIGH (ref 3.8–10.5)
WBC # FLD AUTO: 18.3 K/UL — HIGH (ref 3.8–10.5)
WBC # FLD AUTO: 18.48 K/UL — HIGH (ref 3.8–10.5)
WBC # FLD AUTO: 20.71 K/UL — HIGH (ref 3.8–10.5)
WBC # FLD AUTO: 22.5 K/UL — HIGH (ref 3.8–10.5)
WBC UR QL: >50

## 2020-01-01 PROCEDURE — 96374 THER/PROPH/DIAG INJ IV PUSH: CPT | Mod: XU

## 2020-01-01 PROCEDURE — 96375 TX/PRO/DX INJ NEW DRUG ADDON: CPT | Mod: XU

## 2020-01-01 PROCEDURE — 99223 1ST HOSP IP/OBS HIGH 75: CPT

## 2020-01-01 PROCEDURE — 87633 RESP VIRUS 12-25 TARGETS: CPT

## 2020-01-01 PROCEDURE — 97161 PT EVAL LOW COMPLEX 20 MIN: CPT

## 2020-01-01 PROCEDURE — 83605 ASSAY OF LACTIC ACID: CPT

## 2020-01-01 PROCEDURE — 87086 URINE CULTURE/COLONY COUNT: CPT

## 2020-01-01 PROCEDURE — 85027 COMPLETE CBC AUTOMATED: CPT

## 2020-01-01 PROCEDURE — 87040 BLOOD CULTURE FOR BACTERIA: CPT

## 2020-01-01 PROCEDURE — 84100 ASSAY OF PHOSPHORUS: CPT

## 2020-01-01 PROCEDURE — 85730 THROMBOPLASTIN TIME PARTIAL: CPT

## 2020-01-01 PROCEDURE — 82435 ASSAY OF BLOOD CHLORIDE: CPT

## 2020-01-01 PROCEDURE — 99232 SBSQ HOSP IP/OBS MODERATE 35: CPT

## 2020-01-01 PROCEDURE — 83880 ASSAY OF NATRIURETIC PEPTIDE: CPT

## 2020-01-01 PROCEDURE — 93005 ELECTROCARDIOGRAM TRACING: CPT | Mod: XU

## 2020-01-01 PROCEDURE — 84295 ASSAY OF SERUM SODIUM: CPT

## 2020-01-01 PROCEDURE — 84132 ASSAY OF SERUM POTASSIUM: CPT

## 2020-01-01 PROCEDURE — 87186 SC STD MICRODIL/AGAR DIL: CPT

## 2020-01-01 PROCEDURE — 71045 X-RAY EXAM CHEST 1 VIEW: CPT | Mod: 26

## 2020-01-01 PROCEDURE — 84484 ASSAY OF TROPONIN QUANT: CPT

## 2020-01-01 PROCEDURE — 93010 ELECTROCARDIOGRAM REPORT: CPT

## 2020-01-01 PROCEDURE — 99238 HOSP IP/OBS DSCHRG MGMT 30/<: CPT

## 2020-01-01 PROCEDURE — 93010 ELECTROCARDIOGRAM REPORT: CPT | Mod: 77

## 2020-01-01 PROCEDURE — 85610 PROTHROMBIN TIME: CPT

## 2020-01-01 PROCEDURE — 97530 THERAPEUTIC ACTIVITIES: CPT

## 2020-01-01 PROCEDURE — 82803 BLOOD GASES ANY COMBINATION: CPT

## 2020-01-01 PROCEDURE — 94660 CPAP INITIATION&MGMT: CPT

## 2020-01-01 PROCEDURE — 83735 ASSAY OF MAGNESIUM: CPT

## 2020-01-01 PROCEDURE — 99291 CRITICAL CARE FIRST HOUR: CPT

## 2020-01-01 PROCEDURE — 94640 AIRWAY INHALATION TREATMENT: CPT

## 2020-01-01 PROCEDURE — 87486 CHLMYD PNEUM DNA AMP PROBE: CPT

## 2020-01-01 PROCEDURE — 84145 PROCALCITONIN (PCT): CPT

## 2020-01-01 PROCEDURE — 82962 GLUCOSE BLOOD TEST: CPT

## 2020-01-01 PROCEDURE — 71045 X-RAY EXAM CHEST 1 VIEW: CPT

## 2020-01-01 PROCEDURE — 81001 URINALYSIS AUTO W/SCOPE: CPT

## 2020-01-01 PROCEDURE — 97110 THERAPEUTIC EXERCISES: CPT

## 2020-01-01 PROCEDURE — 80053 COMPREHEN METABOLIC PANEL: CPT

## 2020-01-01 PROCEDURE — 82947 ASSAY GLUCOSE BLOOD QUANT: CPT

## 2020-01-01 PROCEDURE — 80048 BASIC METABOLIC PNL TOTAL CA: CPT

## 2020-01-01 PROCEDURE — 87798 DETECT AGENT NOS DNA AMP: CPT

## 2020-01-01 PROCEDURE — 82330 ASSAY OF CALCIUM: CPT

## 2020-01-01 PROCEDURE — 51701 INSERT BLADDER CATHETER: CPT

## 2020-01-01 PROCEDURE — 87581 M.PNEUMON DNA AMP PROBE: CPT

## 2020-01-01 PROCEDURE — 99291 CRITICAL CARE FIRST HOUR: CPT | Mod: 25

## 2020-01-01 PROCEDURE — 36600 WITHDRAWAL OF ARTERIAL BLOOD: CPT

## 2020-01-01 PROCEDURE — 85014 HEMATOCRIT: CPT

## 2020-01-01 RX ORDER — POTASSIUM CHLORIDE 20 MEQ
40 PACKET (EA) ORAL ONCE
Refills: 0 | Status: COMPLETED | OUTPATIENT
Start: 2020-01-01 | End: 2020-01-01

## 2020-01-01 RX ORDER — POTASSIUM CHLORIDE 20 MEQ
40 PACKET (EA) ORAL EVERY 4 HOURS
Refills: 0 | Status: COMPLETED | OUTPATIENT
Start: 2020-01-01 | End: 2020-01-01

## 2020-01-01 RX ORDER — PIPERACILLIN AND TAZOBACTAM 4; .5 G/20ML; G/20ML
3.38 INJECTION, POWDER, LYOPHILIZED, FOR SOLUTION INTRAVENOUS ONCE
Refills: 0 | Status: COMPLETED | OUTPATIENT
Start: 2020-01-01 | End: 2020-01-01

## 2020-01-01 RX ORDER — METOPROLOL TARTRATE 50 MG
25 TABLET ORAL EVERY 12 HOURS
Refills: 0 | Status: DISCONTINUED | OUTPATIENT
Start: 2020-01-01 | End: 2020-01-01

## 2020-01-01 RX ORDER — LATANOPROST 0.05 MG/ML
1 SOLUTION/ DROPS OPHTHALMIC; TOPICAL AT BEDTIME
Refills: 0 | Status: DISCONTINUED | OUTPATIENT
Start: 2020-01-01 | End: 2020-01-01

## 2020-01-01 RX ORDER — SODIUM CHLORIDE 9 MG/ML
1000 INJECTION, SOLUTION INTRAVENOUS
Refills: 0 | Status: COMPLETED | OUTPATIENT
Start: 2020-01-01 | End: 2020-01-01

## 2020-01-01 RX ORDER — ESCITALOPRAM OXALATE 10 MG/1
15 TABLET, FILM COATED ORAL DAILY
Refills: 0 | Status: DISCONTINUED | OUTPATIENT
Start: 2020-01-01 | End: 2020-01-01

## 2020-01-01 RX ORDER — ALBUTEROL 90 UG/1
2.5 AEROSOL, METERED ORAL ONCE
Refills: 0 | Status: COMPLETED | OUTPATIENT
Start: 2020-01-01 | End: 2020-01-01

## 2020-01-01 RX ORDER — IPRATROPIUM/ALBUTEROL SULFATE 18-103MCG
3 AEROSOL WITH ADAPTER (GRAM) INHALATION EVERY 6 HOURS
Refills: 0 | Status: DISCONTINUED | OUTPATIENT
Start: 2020-01-01 | End: 2020-01-01

## 2020-01-01 RX ORDER — POTASSIUM CHLORIDE 20 MEQ
40 PACKET (EA) ORAL ONCE
Refills: 0 | Status: DISCONTINUED | OUTPATIENT
Start: 2020-01-01 | End: 2020-01-01

## 2020-01-01 RX ORDER — ASPIRIN/CALCIUM CARB/MAGNESIUM 324 MG
162 TABLET ORAL ONCE
Refills: 0 | Status: COMPLETED | OUTPATIENT
Start: 2020-01-01 | End: 2020-01-01

## 2020-01-01 RX ORDER — CLOPIDOGREL BISULFATE 75 MG/1
75 TABLET, FILM COATED ORAL DAILY
Refills: 0 | Status: DISCONTINUED | OUTPATIENT
Start: 2020-01-01 | End: 2020-01-01

## 2020-01-01 RX ORDER — CEFTRIAXONE 500 MG/1
1000 INJECTION, POWDER, FOR SOLUTION INTRAMUSCULAR; INTRAVENOUS EVERY 24 HOURS
Refills: 0 | Status: DISCONTINUED | OUTPATIENT
Start: 2020-01-01 | End: 2020-01-01

## 2020-01-01 RX ORDER — AZITHROMYCIN 500 MG/1
500 TABLET, FILM COATED ORAL ONCE
Refills: 0 | Status: COMPLETED | OUTPATIENT
Start: 2020-01-01 | End: 2020-01-01

## 2020-01-01 RX ORDER — ROBINUL 0.2 MG/ML
0.2 INJECTION INTRAMUSCULAR; INTRAVENOUS EVERY 6 HOURS
Refills: 0 | Status: DISCONTINUED | OUTPATIENT
Start: 2020-01-01 | End: 2020-01-01

## 2020-01-01 RX ORDER — PANTOPRAZOLE SODIUM 20 MG/1
40 TABLET, DELAYED RELEASE ORAL
Refills: 0 | Status: DISCONTINUED | OUTPATIENT
Start: 2020-01-01 | End: 2020-01-01

## 2020-01-01 RX ORDER — PIPERACILLIN AND TAZOBACTAM 4; .5 G/20ML; G/20ML
3.38 INJECTION, POWDER, LYOPHILIZED, FOR SOLUTION INTRAVENOUS EVERY 8 HOURS
Refills: 0 | Status: DISCONTINUED | OUTPATIENT
Start: 2020-01-01 | End: 2020-01-01

## 2020-01-01 RX ORDER — AMLODIPINE BESYLATE 2.5 MG/1
5 TABLET ORAL DAILY
Refills: 0 | Status: DISCONTINUED | OUTPATIENT
Start: 2020-01-01 | End: 2020-01-01

## 2020-01-01 RX ORDER — ACETAMINOPHEN 500 MG
650 TABLET ORAL EVERY 6 HOURS
Refills: 0 | Status: DISCONTINUED | OUTPATIENT
Start: 2020-01-01 | End: 2020-01-01

## 2020-01-01 RX ORDER — DIGOXIN 250 MCG
0.12 TABLET ORAL ONCE
Refills: 0 | Status: COMPLETED | OUTPATIENT
Start: 2020-01-01 | End: 2020-01-01

## 2020-01-01 RX ORDER — VANCOMYCIN HCL 1 G
1000 VIAL (EA) INTRAVENOUS ONCE
Refills: 0 | Status: COMPLETED | OUTPATIENT
Start: 2020-01-01 | End: 2020-01-01

## 2020-01-01 RX ORDER — HEPARIN SODIUM 5000 [USP'U]/ML
5000 INJECTION INTRAVENOUS; SUBCUTANEOUS
Refills: 0 | Status: DISCONTINUED | OUTPATIENT
Start: 2020-01-01 | End: 2020-01-01

## 2020-01-01 RX ORDER — POTASSIUM CHLORIDE 20 MEQ
20 PACKET (EA) ORAL ONCE
Refills: 0 | Status: COMPLETED | OUTPATIENT
Start: 2020-01-01 | End: 2020-01-01

## 2020-01-01 RX ORDER — DIGOXIN 250 MCG
0.12 TABLET ORAL DAILY
Refills: 0 | Status: DISCONTINUED | OUTPATIENT
Start: 2020-01-01 | End: 2020-01-01

## 2020-01-01 RX ORDER — TIMOLOL 0.5 %
1 DROPS OPHTHALMIC (EYE) DAILY
Refills: 0 | Status: DISCONTINUED | OUTPATIENT
Start: 2020-01-01 | End: 2020-01-01

## 2020-01-01 RX ORDER — NITROGLYCERIN 6.5 MG
0.4 CAPSULE, EXTENDED RELEASE ORAL ONCE
Refills: 0 | Status: COMPLETED | OUTPATIENT
Start: 2020-01-01 | End: 2020-01-01

## 2020-01-01 RX ORDER — POLYETHYLENE GLYCOL 3350 17 G/17G
17 POWDER, FOR SOLUTION ORAL DAILY
Refills: 0 | Status: DISCONTINUED | OUTPATIENT
Start: 2020-01-01 | End: 2020-01-01

## 2020-01-01 RX ADMIN — Medication 0.4 MILLIGRAM(S): at 15:15

## 2020-01-01 RX ADMIN — Medication 25 MILLIGRAM(S): at 17:01

## 2020-01-01 RX ADMIN — Medication 20 MILLIEQUIVALENT(S): at 12:30

## 2020-01-01 RX ADMIN — Medication 3 MILLILITER(S): at 18:51

## 2020-01-01 RX ADMIN — HEPARIN SODIUM 5000 UNIT(S): 5000 INJECTION INTRAVENOUS; SUBCUTANEOUS at 06:43

## 2020-01-01 RX ADMIN — CLOPIDOGREL BISULFATE 75 MILLIGRAM(S): 75 TABLET, FILM COATED ORAL at 12:44

## 2020-01-01 RX ADMIN — Medication 40 MILLIEQUIVALENT(S): at 17:40

## 2020-01-01 RX ADMIN — Medication 3 MILLILITER(S): at 23:04

## 2020-01-01 RX ADMIN — PIPERACILLIN AND TAZOBACTAM 25 GRAM(S): 4; .5 INJECTION, POWDER, LYOPHILIZED, FOR SOLUTION INTRAVENOUS at 22:21

## 2020-01-01 RX ADMIN — LATANOPROST 1 DROP(S): 0.05 SOLUTION/ DROPS OPHTHALMIC; TOPICAL at 23:57

## 2020-01-01 RX ADMIN — CEFTRIAXONE 100 MILLIGRAM(S): 500 INJECTION, POWDER, FOR SOLUTION INTRAMUSCULAR; INTRAVENOUS at 22:52

## 2020-01-01 RX ADMIN — LATANOPROST 1 DROP(S): 0.05 SOLUTION/ DROPS OPHTHALMIC; TOPICAL at 23:04

## 2020-01-01 RX ADMIN — Medication 600 MILLIGRAM(S): at 18:52

## 2020-01-01 RX ADMIN — Medication 0.12 MILLIGRAM(S): at 06:14

## 2020-01-01 RX ADMIN — Medication 600 MILLIGRAM(S): at 17:13

## 2020-01-01 RX ADMIN — Medication 600 MILLIGRAM(S): at 18:00

## 2020-01-01 RX ADMIN — SODIUM CHLORIDE 40 MILLILITER(S): 9 INJECTION, SOLUTION INTRAVENOUS at 17:50

## 2020-01-01 RX ADMIN — Medication 3 MILLILITER(S): at 18:11

## 2020-01-01 RX ADMIN — Medication 40 MILLIEQUIVALENT(S): at 14:12

## 2020-01-01 RX ADMIN — AMLODIPINE BESYLATE 5 MILLIGRAM(S): 2.5 TABLET ORAL at 06:34

## 2020-01-01 RX ADMIN — PANTOPRAZOLE SODIUM 40 MILLIGRAM(S): 20 TABLET, DELAYED RELEASE ORAL at 05:20

## 2020-01-01 RX ADMIN — Medication 3 MILLILITER(S): at 23:53

## 2020-01-01 RX ADMIN — Medication 1 DROP(S): at 11:21

## 2020-01-01 RX ADMIN — PIPERACILLIN AND TAZOBACTAM 25 GRAM(S): 4; .5 INJECTION, POWDER, LYOPHILIZED, FOR SOLUTION INTRAVENOUS at 06:35

## 2020-01-01 RX ADMIN — CLOPIDOGREL BISULFATE 75 MILLIGRAM(S): 75 TABLET, FILM COATED ORAL at 12:11

## 2020-01-01 RX ADMIN — Medication 0.12 MILLIGRAM(S): at 05:36

## 2020-01-01 RX ADMIN — PIPERACILLIN AND TAZOBACTAM 200 GRAM(S): 4; .5 INJECTION, POWDER, LYOPHILIZED, FOR SOLUTION INTRAVENOUS at 17:50

## 2020-01-01 RX ADMIN — LATANOPROST 1 DROP(S): 0.05 SOLUTION/ DROPS OPHTHALMIC; TOPICAL at 23:50

## 2020-01-01 RX ADMIN — Medication 3 MILLILITER(S): at 23:40

## 2020-01-01 RX ADMIN — Medication 25 MILLIGRAM(S): at 06:21

## 2020-01-01 RX ADMIN — CEFTRIAXONE 100 MILLIGRAM(S): 500 INJECTION, POWDER, FOR SOLUTION INTRAMUSCULAR; INTRAVENOUS at 22:21

## 2020-01-01 RX ADMIN — Medication 3 MILLILITER(S): at 17:53

## 2020-01-01 RX ADMIN — HEPARIN SODIUM 5000 UNIT(S): 5000 INJECTION INTRAVENOUS; SUBCUTANEOUS at 18:14

## 2020-01-01 RX ADMIN — CLOPIDOGREL BISULFATE 75 MILLIGRAM(S): 75 TABLET, FILM COATED ORAL at 13:25

## 2020-01-01 RX ADMIN — Medication 3 MILLILITER(S): at 12:42

## 2020-01-01 RX ADMIN — HEPARIN SODIUM 5000 UNIT(S): 5000 INJECTION INTRAVENOUS; SUBCUTANEOUS at 05:50

## 2020-01-01 RX ADMIN — PIPERACILLIN AND TAZOBACTAM 25 GRAM(S): 4; .5 INJECTION, POWDER, LYOPHILIZED, FOR SOLUTION INTRAVENOUS at 14:35

## 2020-01-01 RX ADMIN — PIPERACILLIN AND TAZOBACTAM 25 GRAM(S): 4; .5 INJECTION, POWDER, LYOPHILIZED, FOR SOLUTION INTRAVENOUS at 14:38

## 2020-01-01 RX ADMIN — ESCITALOPRAM OXALATE 15 MILLIGRAM(S): 10 TABLET, FILM COATED ORAL at 11:23

## 2020-01-01 RX ADMIN — Medication 20 MILLIGRAM(S): at 14:34

## 2020-01-01 RX ADMIN — Medication 1 DROP(S): at 18:30

## 2020-01-01 RX ADMIN — Medication 3 MILLILITER(S): at 06:35

## 2020-01-01 RX ADMIN — Medication 650 MILLIGRAM(S): at 17:30

## 2020-01-01 RX ADMIN — Medication 5 MILLIGRAM(S): at 05:50

## 2020-01-01 RX ADMIN — PIPERACILLIN AND TAZOBACTAM 25 GRAM(S): 4; .5 INJECTION, POWDER, LYOPHILIZED, FOR SOLUTION INTRAVENOUS at 06:44

## 2020-01-01 RX ADMIN — Medication 2.5 MILLIGRAM(S): at 06:34

## 2020-01-01 RX ADMIN — Medication 20 MILLIGRAM(S): at 05:20

## 2020-01-01 RX ADMIN — HEPARIN SODIUM 5000 UNIT(S): 5000 INJECTION INTRAVENOUS; SUBCUTANEOUS at 17:53

## 2020-01-01 RX ADMIN — Medication 25 MILLIGRAM(S): at 22:22

## 2020-01-01 RX ADMIN — Medication 600 MILLIGRAM(S): at 18:43

## 2020-01-01 RX ADMIN — ESCITALOPRAM OXALATE 15 MILLIGRAM(S): 10 TABLET, FILM COATED ORAL at 13:55

## 2020-01-01 RX ADMIN — Medication 600 MILLIGRAM(S): at 05:50

## 2020-01-01 RX ADMIN — PANTOPRAZOLE SODIUM 40 MILLIGRAM(S): 20 TABLET, DELAYED RELEASE ORAL at 08:22

## 2020-01-01 RX ADMIN — Medication 1 DROP(S): at 12:48

## 2020-01-01 RX ADMIN — Medication 0.12 MILLIGRAM(S): at 06:34

## 2020-01-01 RX ADMIN — PIPERACILLIN AND TAZOBACTAM 25 GRAM(S): 4; .5 INJECTION, POWDER, LYOPHILIZED, FOR SOLUTION INTRAVENOUS at 13:08

## 2020-01-01 RX ADMIN — Medication 20 MILLIGRAM(S): at 06:35

## 2020-01-01 RX ADMIN — Medication 1 DROP(S): at 11:49

## 2020-01-01 RX ADMIN — Medication 2.5 MILLIGRAM(S): at 06:18

## 2020-01-01 RX ADMIN — HEPARIN SODIUM 5000 UNIT(S): 5000 INJECTION INTRAVENOUS; SUBCUTANEOUS at 06:34

## 2020-01-01 RX ADMIN — Medication 5 MILLIGRAM(S): at 12:43

## 2020-01-01 RX ADMIN — Medication 0.12 MILLIGRAM(S): at 06:43

## 2020-01-01 RX ADMIN — HEPARIN SODIUM 5000 UNIT(S): 5000 INJECTION INTRAVENOUS; SUBCUTANEOUS at 06:22

## 2020-01-01 RX ADMIN — PIPERACILLIN AND TAZOBACTAM 25 GRAM(S): 4; .5 INJECTION, POWDER, LYOPHILIZED, FOR SOLUTION INTRAVENOUS at 22:17

## 2020-01-01 RX ADMIN — LATANOPROST 1 DROP(S): 0.05 SOLUTION/ DROPS OPHTHALMIC; TOPICAL at 21:18

## 2020-01-01 RX ADMIN — Medication 0.12 MILLIGRAM(S): at 06:17

## 2020-01-01 RX ADMIN — Medication 25 MILLIGRAM(S): at 08:22

## 2020-01-01 RX ADMIN — CLOPIDOGREL BISULFATE 75 MILLIGRAM(S): 75 TABLET, FILM COATED ORAL at 13:55

## 2020-01-01 RX ADMIN — Medication 5 MILLIGRAM(S): at 18:52

## 2020-01-01 RX ADMIN — Medication 600 MILLIGRAM(S): at 18:14

## 2020-01-01 RX ADMIN — Medication 3 MILLILITER(S): at 13:25

## 2020-01-01 RX ADMIN — Medication 600 MILLIGRAM(S): at 06:14

## 2020-01-01 RX ADMIN — Medication 2.5 MILLIGRAM(S): at 05:20

## 2020-01-01 RX ADMIN — HEPARIN SODIUM 5000 UNIT(S): 5000 INJECTION INTRAVENOUS; SUBCUTANEOUS at 18:51

## 2020-01-01 RX ADMIN — PANTOPRAZOLE SODIUM 40 MILLIGRAM(S): 20 TABLET, DELAYED RELEASE ORAL at 06:22

## 2020-01-01 RX ADMIN — Medication 20 MILLIGRAM(S): at 22:18

## 2020-01-01 RX ADMIN — Medication 1 DROP(S): at 12:11

## 2020-01-01 RX ADMIN — Medication 0.12 MILLIGRAM(S): at 06:21

## 2020-01-01 RX ADMIN — Medication 40 MILLIEQUIVALENT(S): at 13:58

## 2020-01-01 RX ADMIN — Medication 20 MILLIGRAM(S): at 13:14

## 2020-01-01 RX ADMIN — Medication 3 MILLILITER(S): at 22:21

## 2020-01-01 RX ADMIN — Medication 3 MILLILITER(S): at 17:39

## 2020-01-01 RX ADMIN — Medication 3 MILLILITER(S): at 12:29

## 2020-01-01 RX ADMIN — CLOPIDOGREL BISULFATE 75 MILLIGRAM(S): 75 TABLET, FILM COATED ORAL at 12:59

## 2020-01-01 RX ADMIN — Medication 25 MILLIGRAM(S): at 05:37

## 2020-01-01 RX ADMIN — ESCITALOPRAM OXALATE 15 MILLIGRAM(S): 10 TABLET, FILM COATED ORAL at 12:58

## 2020-01-01 RX ADMIN — LATANOPROST 1 DROP(S): 0.05 SOLUTION/ DROPS OPHTHALMIC; TOPICAL at 22:20

## 2020-01-01 RX ADMIN — AMLODIPINE BESYLATE 5 MILLIGRAM(S): 2.5 TABLET ORAL at 05:20

## 2020-01-01 RX ADMIN — Medication 20 MILLIGRAM(S): at 21:58

## 2020-01-01 RX ADMIN — Medication 20 MILLIGRAM(S): at 23:30

## 2020-01-01 RX ADMIN — CLOPIDOGREL BISULFATE 75 MILLIGRAM(S): 75 TABLET, FILM COATED ORAL at 16:38

## 2020-01-01 RX ADMIN — HEPARIN SODIUM 5000 UNIT(S): 5000 INJECTION INTRAVENOUS; SUBCUTANEOUS at 17:01

## 2020-01-01 RX ADMIN — PIPERACILLIN AND TAZOBACTAM 25 GRAM(S): 4; .5 INJECTION, POWDER, LYOPHILIZED, FOR SOLUTION INTRAVENOUS at 21:18

## 2020-01-01 RX ADMIN — CLOPIDOGREL BISULFATE 75 MILLIGRAM(S): 75 TABLET, FILM COATED ORAL at 12:29

## 2020-01-01 RX ADMIN — Medication 3 MILLILITER(S): at 13:10

## 2020-01-01 RX ADMIN — Medication 3 MILLILITER(S): at 05:36

## 2020-01-01 RX ADMIN — AMLODIPINE BESYLATE 5 MILLIGRAM(S): 2.5 TABLET ORAL at 06:14

## 2020-01-01 RX ADMIN — Medication 0.12 MILLIGRAM(S): at 05:50

## 2020-01-01 RX ADMIN — Medication 25 MILLIGRAM(S): at 05:20

## 2020-01-01 RX ADMIN — AMLODIPINE BESYLATE 5 MILLIGRAM(S): 2.5 TABLET ORAL at 05:50

## 2020-01-01 RX ADMIN — Medication 2.5 MILLIGRAM(S): at 05:37

## 2020-01-01 RX ADMIN — Medication 600 MILLIGRAM(S): at 18:20

## 2020-01-01 RX ADMIN — Medication 3 MILLILITER(S): at 18:20

## 2020-01-01 RX ADMIN — Medication 20 MILLIGRAM(S): at 16:34

## 2020-01-01 RX ADMIN — HEPARIN SODIUM 5000 UNIT(S): 5000 INJECTION INTRAVENOUS; SUBCUTANEOUS at 06:57

## 2020-01-01 RX ADMIN — Medication 3 MILLILITER(S): at 23:30

## 2020-01-01 RX ADMIN — Medication 0.5 MILLIGRAM(S): at 15:50

## 2020-01-01 RX ADMIN — Medication 3 MILLILITER(S): at 18:43

## 2020-01-01 RX ADMIN — ESCITALOPRAM OXALATE 15 MILLIGRAM(S): 10 TABLET, FILM COATED ORAL at 16:37

## 2020-01-01 RX ADMIN — HEPARIN SODIUM 5000 UNIT(S): 5000 INJECTION INTRAVENOUS; SUBCUTANEOUS at 17:12

## 2020-01-01 RX ADMIN — Medication 3 MILLILITER(S): at 18:32

## 2020-01-01 RX ADMIN — Medication 3 MILLILITER(S): at 06:09

## 2020-01-01 RX ADMIN — Medication 1 DROP(S): at 12:30

## 2020-01-01 RX ADMIN — PIPERACILLIN AND TAZOBACTAM 200 GRAM(S): 4; .5 INJECTION, POWDER, LYOPHILIZED, FOR SOLUTION INTRAVENOUS at 16:46

## 2020-01-01 RX ADMIN — PANTOPRAZOLE SODIUM 40 MILLIGRAM(S): 20 TABLET, DELAYED RELEASE ORAL at 06:43

## 2020-01-01 RX ADMIN — AMLODIPINE BESYLATE 5 MILLIGRAM(S): 2.5 TABLET ORAL at 08:22

## 2020-01-01 RX ADMIN — HEPARIN SODIUM 5000 UNIT(S): 5000 INJECTION INTRAVENOUS; SUBCUTANEOUS at 06:18

## 2020-01-01 RX ADMIN — PANTOPRAZOLE SODIUM 40 MILLIGRAM(S): 20 TABLET, DELAYED RELEASE ORAL at 06:34

## 2020-01-01 RX ADMIN — Medication 3 MILLILITER(S): at 18:00

## 2020-01-01 RX ADMIN — Medication 3 MILLILITER(S): at 06:14

## 2020-01-01 RX ADMIN — HEPARIN SODIUM 5000 UNIT(S): 5000 INJECTION INTRAVENOUS; SUBCUTANEOUS at 05:20

## 2020-01-01 RX ADMIN — ESCITALOPRAM OXALATE 15 MILLIGRAM(S): 10 TABLET, FILM COATED ORAL at 12:30

## 2020-01-01 RX ADMIN — Medication 162 MILLIGRAM(S): at 15:51

## 2020-01-01 RX ADMIN — ESCITALOPRAM OXALATE 15 MILLIGRAM(S): 10 TABLET, FILM COATED ORAL at 12:45

## 2020-01-01 RX ADMIN — Medication 0.12 MILLIGRAM(S): at 05:27

## 2020-01-01 RX ADMIN — Medication 2.5 MILLIGRAM(S): at 08:22

## 2020-01-01 RX ADMIN — HEPARIN SODIUM 5000 UNIT(S): 5000 INJECTION INTRAVENOUS; SUBCUTANEOUS at 06:35

## 2020-01-01 RX ADMIN — HEPARIN SODIUM 5000 UNIT(S): 5000 INJECTION INTRAVENOUS; SUBCUTANEOUS at 06:14

## 2020-01-01 RX ADMIN — Medication 3 MILLILITER(S): at 11:23

## 2020-01-01 RX ADMIN — CLOPIDOGREL BISULFATE 75 MILLIGRAM(S): 75 TABLET, FILM COATED ORAL at 12:32

## 2020-01-01 RX ADMIN — ESCITALOPRAM OXALATE 15 MILLIGRAM(S): 10 TABLET, FILM COATED ORAL at 12:12

## 2020-01-01 RX ADMIN — HEPARIN SODIUM 5000 UNIT(S): 5000 INJECTION INTRAVENOUS; SUBCUTANEOUS at 18:28

## 2020-01-01 RX ADMIN — Medication 600 MILLIGRAM(S): at 06:21

## 2020-01-01 RX ADMIN — AMLODIPINE BESYLATE 5 MILLIGRAM(S): 2.5 TABLET ORAL at 06:18

## 2020-01-01 RX ADMIN — Medication 25 MILLIGRAM(S): at 18:11

## 2020-01-01 RX ADMIN — Medication 650 MILLIGRAM(S): at 16:36

## 2020-01-01 RX ADMIN — Medication 25 MILLIGRAM(S): at 06:34

## 2020-01-01 RX ADMIN — Medication 600 MILLIGRAM(S): at 06:34

## 2020-01-01 RX ADMIN — Medication 3 MILLILITER(S): at 00:44

## 2020-01-01 RX ADMIN — Medication 3 MILLILITER(S): at 05:50

## 2020-01-01 RX ADMIN — AMLODIPINE BESYLATE 5 MILLIGRAM(S): 2.5 TABLET ORAL at 05:37

## 2020-01-01 RX ADMIN — ESCITALOPRAM OXALATE 15 MILLIGRAM(S): 10 TABLET, FILM COATED ORAL at 13:00

## 2020-01-01 RX ADMIN — CLOPIDOGREL BISULFATE 75 MILLIGRAM(S): 75 TABLET, FILM COATED ORAL at 11:23

## 2020-01-01 RX ADMIN — Medication 25 MILLIGRAM(S): at 17:19

## 2020-01-01 RX ADMIN — Medication 3 MILLILITER(S): at 12:59

## 2020-01-01 RX ADMIN — AMLODIPINE BESYLATE 5 MILLIGRAM(S): 2.5 TABLET ORAL at 06:43

## 2020-01-01 RX ADMIN — Medication 3 MILLILITER(S): at 00:36

## 2020-01-01 RX ADMIN — CLOPIDOGREL BISULFATE 75 MILLIGRAM(S): 75 TABLET, FILM COATED ORAL at 12:47

## 2020-01-01 RX ADMIN — Medication 25 MILLIGRAM(S): at 06:16

## 2020-01-01 RX ADMIN — PIPERACILLIN AND TAZOBACTAM 25 GRAM(S): 4; .5 INJECTION, POWDER, LYOPHILIZED, FOR SOLUTION INTRAVENOUS at 15:17

## 2020-01-01 RX ADMIN — Medication 25 MILLIGRAM(S): at 06:43

## 2020-01-01 RX ADMIN — PANTOPRAZOLE SODIUM 40 MILLIGRAM(S): 20 TABLET, DELAYED RELEASE ORAL at 06:14

## 2020-01-01 RX ADMIN — PIPERACILLIN AND TAZOBACTAM 25 GRAM(S): 4; .5 INJECTION, POWDER, LYOPHILIZED, FOR SOLUTION INTRAVENOUS at 05:36

## 2020-01-01 RX ADMIN — Medication 1 DROP(S): at 17:01

## 2020-01-01 RX ADMIN — HEPARIN SODIUM 5000 UNIT(S): 5000 INJECTION INTRAVENOUS; SUBCUTANEOUS at 18:43

## 2020-01-01 RX ADMIN — Medication 40 MILLIEQUIVALENT(S): at 17:50

## 2020-01-01 RX ADMIN — LATANOPROST 1 DROP(S): 0.05 SOLUTION/ DROPS OPHTHALMIC; TOPICAL at 21:58

## 2020-01-01 RX ADMIN — Medication 0.12 MILLIGRAM(S): at 06:57

## 2020-01-01 RX ADMIN — HEPARIN SODIUM 5000 UNIT(S): 5000 INJECTION INTRAVENOUS; SUBCUTANEOUS at 05:37

## 2020-01-01 RX ADMIN — Medication 1 DROP(S): at 13:00

## 2020-01-01 RX ADMIN — LATANOPROST 1 DROP(S): 0.05 SOLUTION/ DROPS OPHTHALMIC; TOPICAL at 21:26

## 2020-01-01 RX ADMIN — CLOPIDOGREL BISULFATE 75 MILLIGRAM(S): 75 TABLET, FILM COATED ORAL at 12:58

## 2020-01-01 RX ADMIN — Medication 40 MILLIEQUIVALENT(S): at 11:23

## 2020-01-01 RX ADMIN — ALBUTEROL 2.5 MILLIGRAM(S): 90 AEROSOL, METERED ORAL at 09:25

## 2020-01-01 RX ADMIN — PIPERACILLIN AND TAZOBACTAM 25 GRAM(S): 4; .5 INJECTION, POWDER, LYOPHILIZED, FOR SOLUTION INTRAVENOUS at 06:16

## 2020-01-01 RX ADMIN — LATANOPROST 1 DROP(S): 0.05 SOLUTION/ DROPS OPHTHALMIC; TOPICAL at 22:33

## 2020-01-01 RX ADMIN — Medication 2.5 MILLIGRAM(S): at 06:21

## 2020-01-01 RX ADMIN — Medication 3 MILLILITER(S): at 06:17

## 2020-01-01 RX ADMIN — Medication 1 DROP(S): at 18:32

## 2020-01-01 RX ADMIN — Medication 1 DROP(S): at 12:57

## 2020-01-01 RX ADMIN — ESCITALOPRAM OXALATE 15 MILLIGRAM(S): 10 TABLET, FILM COATED ORAL at 12:32

## 2020-01-01 RX ADMIN — AZITHROMYCIN 250 MILLIGRAM(S): 500 TABLET, FILM COATED ORAL at 17:31

## 2020-01-01 RX ADMIN — Medication 3 MILLILITER(S): at 12:11

## 2020-01-01 RX ADMIN — HEPARIN SODIUM 5000 UNIT(S): 5000 INJECTION INTRAVENOUS; SUBCUTANEOUS at 18:11

## 2020-01-01 RX ADMIN — Medication 250 MILLIGRAM(S): at 18:22

## 2020-01-01 RX ADMIN — LATANOPROST 1 DROP(S): 0.05 SOLUTION/ DROPS OPHTHALMIC; TOPICAL at 21:21

## 2020-01-01 RX ADMIN — PIPERACILLIN AND TAZOBACTAM 25 GRAM(S): 4; .5 INJECTION, POWDER, LYOPHILIZED, FOR SOLUTION INTRAVENOUS at 14:21

## 2020-01-01 RX ADMIN — AMLODIPINE BESYLATE 5 MILLIGRAM(S): 2.5 TABLET ORAL at 06:17

## 2020-01-01 RX ADMIN — Medication 3 MILLILITER(S): at 06:18

## 2020-01-01 RX ADMIN — LATANOPROST 1 DROP(S): 0.05 SOLUTION/ DROPS OPHTHALMIC; TOPICAL at 21:59

## 2020-01-01 RX ADMIN — Medication 3 MILLILITER(S): at 06:19

## 2020-01-01 RX ADMIN — Medication 3 MILLILITER(S): at 00:29

## 2020-01-01 RX ADMIN — Medication 1 DROP(S): at 13:10

## 2020-01-01 RX ADMIN — Medication 25 MILLIGRAM(S): at 18:14

## 2020-01-01 RX ADMIN — PANTOPRAZOLE SODIUM 40 MILLIGRAM(S): 20 TABLET, DELAYED RELEASE ORAL at 05:50

## 2020-01-01 RX ADMIN — Medication 5 MILLIGRAM(S): at 06:12

## 2020-01-01 RX ADMIN — ESCITALOPRAM OXALATE 15 MILLIGRAM(S): 10 TABLET, FILM COATED ORAL at 12:47

## 2020-01-01 RX ADMIN — Medication 600 MILLIGRAM(S): at 06:43

## 2020-01-01 RX ADMIN — AMLODIPINE BESYLATE 5 MILLIGRAM(S): 2.5 TABLET ORAL at 06:22

## 2020-01-01 RX ADMIN — Medication 3 MILLILITER(S): at 00:00

## 2020-01-01 RX ADMIN — HEPARIN SODIUM 5000 UNIT(S): 5000 INJECTION INTRAVENOUS; SUBCUTANEOUS at 17:59

## 2020-01-01 RX ADMIN — Medication 600 MILLIGRAM(S): at 06:35

## 2020-01-01 RX ADMIN — Medication 2.5 MILLIGRAM(S): at 06:17

## 2020-01-01 RX ADMIN — Medication 3 MILLILITER(S): at 06:22

## 2020-01-01 RX ADMIN — PANTOPRAZOLE SODIUM 40 MILLIGRAM(S): 20 TABLET, DELAYED RELEASE ORAL at 06:18

## 2020-01-01 RX ADMIN — Medication 3 MILLILITER(S): at 17:19

## 2020-01-01 RX ADMIN — HEPARIN SODIUM 5000 UNIT(S): 5000 INJECTION INTRAVENOUS; SUBCUTANEOUS at 06:16

## 2020-01-01 RX ADMIN — Medication 3 MILLILITER(S): at 11:56

## 2020-01-01 RX ADMIN — ESCITALOPRAM OXALATE 15 MILLIGRAM(S): 10 TABLET, FILM COATED ORAL at 13:26

## 2020-01-01 RX ADMIN — HEPARIN SODIUM 5000 UNIT(S): 5000 INJECTION INTRAVENOUS; SUBCUTANEOUS at 18:20

## 2020-01-01 RX ADMIN — Medication 20 MILLIGRAM(S): at 21:25

## 2020-01-01 RX ADMIN — Medication 20 MILLIGRAM(S): at 06:24

## 2020-01-01 RX ADMIN — LATANOPROST 1 DROP(S): 0.05 SOLUTION/ DROPS OPHTHALMIC; TOPICAL at 23:30

## 2020-01-01 RX ADMIN — ESCITALOPRAM OXALATE 15 MILLIGRAM(S): 10 TABLET, FILM COATED ORAL at 11:49

## 2020-01-01 RX ADMIN — Medication 25 MILLIGRAM(S): at 17:39

## 2020-01-01 RX ADMIN — AMLODIPINE BESYLATE 5 MILLIGRAM(S): 2.5 TABLET ORAL at 06:13

## 2020-01-01 RX ADMIN — HEPARIN SODIUM 5000 UNIT(S): 5000 INJECTION INTRAVENOUS; SUBCUTANEOUS at 17:40

## 2020-01-01 RX ADMIN — Medication 25 MILLIGRAM(S): at 18:51

## 2020-01-01 RX ADMIN — PIPERACILLIN AND TAZOBACTAM 25 GRAM(S): 4; .5 INJECTION, POWDER, LYOPHILIZED, FOR SOLUTION INTRAVENOUS at 23:30

## 2020-01-01 RX ADMIN — Medication 1 DROP(S): at 13:55

## 2020-01-01 RX ADMIN — Medication 5 MILLIGRAM(S): at 06:14

## 2020-01-01 RX ADMIN — Medication 3 MILLILITER(S): at 12:58

## 2020-01-01 RX ADMIN — Medication 3 MILLILITER(S): at 06:44

## 2020-01-01 RX ADMIN — PANTOPRAZOLE SODIUM 40 MILLIGRAM(S): 20 TABLET, DELAYED RELEASE ORAL at 06:12

## 2020-01-01 RX ADMIN — Medication 20 MILLIGRAM(S): at 15:00

## 2020-01-01 RX ADMIN — Medication 25 MILLIGRAM(S): at 06:35

## 2020-01-01 RX ADMIN — Medication 25 MILLIGRAM(S): at 18:00

## 2020-01-01 RX ADMIN — PIPERACILLIN AND TAZOBACTAM 25 GRAM(S): 4; .5 INJECTION, POWDER, LYOPHILIZED, FOR SOLUTION INTRAVENOUS at 22:33

## 2020-01-01 RX ADMIN — CLOPIDOGREL BISULFATE 75 MILLIGRAM(S): 75 TABLET, FILM COATED ORAL at 11:48

## 2020-01-01 RX ADMIN — PIPERACILLIN AND TAZOBACTAM 25 GRAM(S): 4; .5 INJECTION, POWDER, LYOPHILIZED, FOR SOLUTION INTRAVENOUS at 22:55

## 2020-01-01 RX ADMIN — PIPERACILLIN AND TAZOBACTAM 25 GRAM(S): 4; .5 INJECTION, POWDER, LYOPHILIZED, FOR SOLUTION INTRAVENOUS at 05:15

## 2020-01-01 RX ADMIN — Medication 3 MILLILITER(S): at 23:44

## 2020-01-01 RX ADMIN — Medication 3 MILLILITER(S): at 18:14

## 2020-01-01 RX ADMIN — HEPARIN SODIUM 5000 UNIT(S): 5000 INJECTION INTRAVENOUS; SUBCUTANEOUS at 18:32

## 2020-01-01 RX ADMIN — CEFTRIAXONE 100 MILLIGRAM(S): 500 INJECTION, POWDER, FOR SOLUTION INTRAMUSCULAR; INTRAVENOUS at 21:26

## 2020-01-20 NOTE — ED PROVIDER NOTE - PROGRESS NOTE DETAILS
Gómez Cancino documentation for Nickie White):    MDM - 89 year old female from [facility] presents to the ED c/o SOB. Pt is tachypneic with respiratory rate to the 40s and HTN with BP systolic >180. Concerned for hypertensive emergency, will treat with sublingual nitroglycerin and transition to IV drip. Plan for x-ray and likely respiratory support with high flow NC. Phone call to pt's daughter Mahi Kirkpatrick, unable to leave message. Will attempt again. Spoke with pt's healthcare proxy, Mahi Kirkpatrick, who confirmed the MOLST form and DNR/DNI but approved use of BiPAP. Spoke with pt's healthcare proxy, Mahi Kirkpatrick, who confirmed the MOLST form and DNR/DNI but approved use of BiPAP. Daughter reports mother has been sick for several days, starting x2 days ago. Sister, Jori Champagne, went to visit mother and endorsed the pt was experiencing coughing with mild difficulty breathing. L on BIPAP w/ improvement in respiratory status, will continue to montiro, BP is 1367/87

## 2020-01-20 NOTE — ED ADULT NURSE NOTE - NSIMPLEMENTINTERV_GEN_ALL_ED
Implemented All Fall with Harm Risk Interventions:  [place] to call system. Call bell, personal items and telephone within reach. Instruct patient to call for assistance. Room bathroom lighting operational. Non-slip footwear when patient is off stretcher. Physically safe environment: no spills, clutter or unnecessary equipment. Stretcher in lowest position, wheels locked, appropriate side rails in place. Provide visual cue, wrist band, yellow gown, etc. Monitor gait and stability. Monitor for mental status changes and reorient to person, place, and time. Review medications for side effects contributing to fall risk. Reinforce activity limits and safety measures with patient and family. Provide visual clues: red socks.

## 2020-01-20 NOTE — ED ADULT NURSE REASSESSMENT NOTE - NS ED NURSE REASSESS COMMENT FT1
Pt straight cathed using sterile technique. Second RN present to confirm sterility. Pt tolerated procedure well. Sterile specimens collected and sent to lab.

## 2020-01-20 NOTE — ED PROVIDER NOTE - OBJECTIVE STATEMENT
89 year old female with pmhx AFib, CAD, HLD, HTN, OA, GERD presents to the ED c/o sudden onset SOB starting x45 minutes ago. +cough. Not currently on blood thinners. Took 81mg aspirin today. Pt BIBEMS from [place]. Jagjit PINEDA. 89 year old female with pmhx AFib, CAD, HLD, HTN, OA, GERD, dementia presents to the ED c/o sudden onset SOB starting x45 minutes ago. +cough. Not currently on blood thinners. Took 81mg aspirin today. Pt BIBEMS from [place]. Jagjit PINEDA.

## 2020-01-20 NOTE — ED ADULT NURSE REASSESSMENT NOTE - BREATHING
Kurt Niño is a 6 spontaneous/labored

## 2020-01-20 NOTE — CHART NOTE - NSCHARTNOTEFT_GEN_A_CORE
Pt seen and examined  RVP +  On bipap  Labs and imaging reviewed  Case discussed with EM resident  Full consult in the AM    A review of the paper chart has been conducted  HCP form present:               Yes and valid []               Yes but invalid []                No [x]   MOLST present:                   Yes and valid [x]               Yes but invalid []                No []  Incapacity form present:   Yes and valid []                Yes but invalid []                No [x]                 N/A []  Living Will:                            Yes and valid []                Yes but invalid []                No [x]      Family Health Care Decision Act Surrogate Decision Maker Hierarchy  [facility] Article [address] Guardian-->Spouse or domestic partner--> Adult child-->Parent-->Sibling--> Close friend     Jori Angulo MD   of Geriatric and Palliative Medicine  [facility]  Please contact the following number for clinical matters: [phone]

## 2020-01-20 NOTE — ED PROVIDER NOTE - PHYSICAL EXAMINATION
General: Moderate respiratory distress, difficulty speaking.   Heart: Regular rate and rhythm. No murmurs, rubs, or gallops.  Lungs: Increased WOB, coarse breath sounds throughout.   Abdomen: Soft, non-tender, non-distended. No organomegaly.  Eyes: PERRL, EOMI.  Neuro: AAOx4, no focal motor or sensory deficits. CN II-XII intact.  Extremities: No lower extremity swelling, 2+ DP and radial pulses.  Skin: No rashes or lesions. General: Moderate respiratory distress, difficulty speaking.   Heart: Regular rate and rhythm. No murmurs, rubs, or gallops.  Lungs: Increased WOB, coarse breath sounds throughout.   Abdomen: Soft, non-tender, non-distended. No organomegaly.  Eyes: PERRL, EOMI.  Neuro: Oriented to self but not to place or time. Intermittently cooperative but easily agitated.   Extremities: Lower extremity pitting edema to the knees, moving all extremities.   Skin: No rashes or lesions.

## 2020-01-20 NOTE — H&P ADULT - HISTORY OF PRESENT ILLNESS
89 year old female with pmhx AFib, CAD, HLD, HTN, OA, GERD, dementia presents to the ED c/o sudden onset SOB starting x45 minutes ago. +cough. Not currently on blood thinners. Took 81mg aspirin today. Pt BEBETO from [place]. Denies CP.  Placed on BiPAP in ED. DN DNI status verified

## 2020-01-20 NOTE — ED PROVIDER NOTE - ATTENDING CONTRIBUTION TO CARE
89 year old female from [facility] presents to the ED c/o SOB. Pt is tachypneic with respiratory rate to the 40s and HTN with BP systolic >180. Concerned for hypertensive emergency, will treat with sublingual nitroglycerin and transition to IV drip. Plan for x-ray and likely respiratory support with high flow NC.  HFNC is not available, will give BIPAP,   IV antibiotics

## 2020-01-20 NOTE — ED ADULT NURSE NOTE - OBJECTIVE STATEMENT
pt BIBA from the [place] and as per EMS, pt "suddenly started to feel SOB and have difficulty breathing approx. 40 minutes ago." pt AOx1 speaking in 1-2 word sentences. increased WOB and retractions noted upon inspiration. pt denies any lightheadedness, dizziness, chest pain, abd. pain, n/v/d, numbness/tingling at present. pt placed on NRB. skin tears noted to B/L forearms, wounds dressed at present.

## 2020-01-20 NOTE — ED PROVIDER NOTE - CLINICAL SUMMARY MEDICAL DECISION MAKING FREE TEXT BOX
Shawn Neely DO, PGY-3: 89F hx CAD, htn, AFfib, p/w acute sob x 30-40min prior to arrival. Visibly tachypneic w/ course bs throughout. Plan for labs, vbg, cxr, ekg, trop, bnp, reassess. Increased suspicion for cardiac etiology given lack of resp history, acute onset. Will also evaluate for pneumonia vs viral cause.

## 2020-01-22 NOTE — PHYSICAL THERAPY INITIAL EVALUATION ADULT - GENERAL OBSERVATIONS, REHAB EVAL
Patient received semi reclined in bed, NAD, VSS on BiPAP, +tele, +continuous O2, +PIV capped, RN cleared patient for PT. Patient received semi reclined in bed, NAD, VSS on BiPAP, +tele, +continuous O2 monitor, +PIV capped, RN cleared patient for PT.

## 2020-01-22 NOTE — CONSULT NOTE ADULT - PROBLEM SELECTOR RECOMMENDATION 4
Spoke with patients daughter Mahi Kirkpatrick/HCP who wishes patient to return to the [facility].  Patient has HHA two days a week 7am 3 pm.  We reviewed current MOLST form, DNR/DNI, limited medical interventions. No changes.  Mahi aware of Hospice benefits but not ready to pursue this level of care at this time.   Discussed above with Dr Brambila, please reconsult if any acute issues arise.  Thank you

## 2020-01-22 NOTE — CONSULT NOTE ADULT - PROBLEM SELECTOR RECOMMENDATION 3
Patient is KPS 20-30% , utilizes wheelchair at [facility] and requires assistance with all basic needs

## 2020-01-22 NOTE — CONSULT NOTE ADULT - ASSESSMENT
89 Y/O pmh  AFib, CAD, HLD, HTN, OA, GERD, dementia admitted with sudden onset SOB, +RSV, +UTI called for goals of care

## 2020-01-22 NOTE — PHYSICAL THERAPY INITIAL EVALUATION ADULT - ADDITIONAL COMMENTS
Per , patient from [facility]. Patient reports she ambulates with rolling walker, unable to provide other information regarding prior level of function.

## 2020-01-22 NOTE — CHART NOTE - NSCHARTNOTEFT_GEN_A_CORE
Notified by RN ?blocked PACs on tele. Patient does not endorse any complaints at this time. VSS. EKG HR 70 first degree HB. BMP, Mag, P ordered; keep K > 4 Mg>2. Continue tele. Consider cardiology consult in AM.     Vital Signs Last 24 Hrs  T(C): 36.7 (22 Jan 2020 03:51), Max: 36.7 (21 Jan 2020 16:38)  T(F): 98 (22 Jan 2020 03:51), Max: 98 (21 Jan 2020 16:38)  HR: 82 (22 Jan 2020 07:16) (60 - 88)  BP: 169/80 (22 Jan 2020 03:51) (147/93 - 171/100)  BP(mean): --  RR: 19 (22 Jan 2020 03:51) (19 - 26)  SpO2: 99% (22 Jan 2020 07:16) (98% - 100%)    will continue to monitor   will endorse to day team     Melani Arnett PA-C

## 2020-01-22 NOTE — PROVIDER CONTACT NOTE (OTHER) - SITUATION
Patient  is having blocked PACs on and off multiple times. Patient asymptomatic, denies any chest pain,no dizziness

## 2020-01-22 NOTE — PHYSICAL THERAPY INITIAL EVALUATION ADULT - PERTINENT HX OF CURRENT PROBLEM, REHAB EVAL
89 year old female with pmhx AFib, CAD, HLD, HTN, OA, GERD, dementia presents to the ED c/o sudden onset SOB. 1/21/2020: Chest xray: Clear lungs

## 2020-01-22 NOTE — CONSULT NOTE ADULT - PROBLEM SELECTOR RECOMMENDATION 2
She is on bipap at this time: repeat ABG: she is DNR: HER CHEST x-ray is clear: she had right lower lobe collapse on 2018 ct scan chest

## 2020-01-22 NOTE — CONSULT NOTE ADULT - SUBJECTIVE AND OBJECTIVE BOX
Patient is a 89y old  Female who presents with a chief complaint of     HPI:  89 year old female with pmhx AFib, CAD, HLD, HTN, OA, GERD, dementia presents to the ED c/o sudden onset SOB starting x45 minutes ago. +cough. Not currently on blood thinners. Took 81mg aspirin today. Pt BIBEMS from [facility]. Denies CP.  Placed on BiPAP in ED. DN DNI status verified (2020 23:19)    jesus [id number]: DW NP on floor as well as some with pt: Pt is alert and awake: chart reviewed     triage note reviewed: she was sob at NH for about 40 minutes: and she suddenly became SOB: she is found to have viral infection too: She s alert and awake      ?FOLLOWING PRESENT  [unkn ] Hx of PE/DVT, [[unkn ] Hx COPD, [ ] Hx of Asthma, [[yes ] Hx of Hospitalization, [ [unkn]  Hx of BiPAP/CPAP use, [[unkn ] Hx of EMMA    Allergies    No Known Allergies    Intolerances        PAST MEDICAL & SURGICAL HISTORY:  Clostridium difficile diarrhea  Hyperlipidemia, unspecified hyperlipidemia type  Coronary artery disease involving native coronary artery of native heart without angina pectoris  Atrial fibrillation, unspecified type  GERD (gastroesophageal reflux disease)  HTN (hypertension)  Dementia  No significant past surgical history      FAMILY HISTORY:  No pertinent family history in first degree relatives      Social History: [ [unkn ] TOBACCO                  [ [unkn ] ETOH                                 [  [unkn] IVDA/DRUGS    REVIEW OF SYSTEMS    Unable to obtain  General:	    Skin/Breast:  	  Ophthalmologic:  	  ENMT:	    Respiratory and Thorax:  	  Cardiovascular:	    Gastrointestinal:	    Genitourinary:	    Musculoskeletal:	    Neurological:	    Psychiatric:	    Hematology/Lymphatics:	    Endocrine:	    Allergic/Immunologic:	    MEDICATIONS  (STANDING):  amLODIPine   Tablet 5 milliGRAM(s) Oral daily  cefTRIAXone   IVPB 1000 milliGRAM(s) IV Intermittent every 24 hours  clopidogrel Tablet 75 milliGRAM(s) Oral daily  digoxin     Tablet 0.125 milliGRAM(s) Oral daily  enalapril 2.5 milliGRAM(s) Oral daily  escitalopram 15 milliGRAM(s) Oral daily  heparin  Injectable 5000 Unit(s) SubCutaneous two times a day  latanoprost 0.005% Ophthalmic Solution 1 Drop(s) Both EYES at bedtime  metoprolol tartrate 25 milliGRAM(s) Oral every 12 hours  pantoprazole    Tablet 40 milliGRAM(s) Oral before breakfast  timolol 0.5% Solution 1 Drop(s) Left EYE daily    MEDICATIONS  (PRN):  acetaminophen   Tablet .. 650 milliGRAM(s) Oral every 6 hours PRN Temp greater or equal to 38C (100.4F), Moderate Pain (4 - 6)  albuterol/ipratropium for Nebulization. 3 milliLiter(s) Nebulizer every 6 hours PRN Shortness of Breath and/or Wheezing  polyethylene glycol 3350 17 Gram(s) Oral daily PRN Constipation       Vital Signs Last 24 Hrs  T(C): 36.7 (2020 03:51), Max: 36.7 (2020 16:38)  T(F): 98 (2020 03:51), Max: 98 (2020 16:38)  HR: 78 (2020 09:51) (60 - 88)  BP: 169/80 (2020 03:51) (147/93 - 171/100)  BP(mean): --  RR: 19 (2020 03:51) (19 - 26)  SpO2: 98% (2020 09:51) (98% - 100%)        I&O's Summary    2020 07:01  -  2020 07:00  --------------------------------------------------------  IN: 25 mL / OUT: 100 mL / NET: -75 mL        Physical Exam:   GENERAL: NAD, well-groomed, well-developed  HEENT: REINALDO/   Atraumatic, Normocephalic  ENMT: No tonsillar erythema, exudates, or enlargement; Moist mucous membranes, Good dentition, No lesions  NECK: Supple, No JVD, Normal thyroid  CHEST/LUNG: Mild wheezing  CVS: Regular rate and rhythm; No murmurs, rubs, or gallops  GI: : Soft, Nontender, Nondistended; Bowel sounds present  NERVOUS SYSTEM:  Alert & awkae  EXTREMITIES:  2+ Peripheral Pulses, No clubbing, cyanosis, or edema  LYMPH: No lymphadenopathy noted  SKIN: No rashes or lesions  ENDOCRINOLOGY: No Thyromegaly  PSYCH: Appropriate    Labs:  0.0<55<4>><20<<7.325>>0.0<<3><<4><<5<<<209>>                            14.7   12.92 )-----------( 244      ( 2020 08:27 )             46.3                         16.5   15.11 )-----------( 290      ( 2020 15:35 )             53.5     0122    139  |  103  |  16  ----------------------------<  81  3.5   |  23  |  0.52  20    x   |  x   |  x   ----------------------------<  x   4.4   |  x   |  x       139  |  102  |  15  ----------------------------<  126<H>  6.3<HH>   |  24  |  0.60    Ca    9.1      2020 08:27  Ca    9.2      2020 15:35  Phos  2.9       Mg     2.0         TPro  8.0  /  Alb  3.7  /  TBili  0.7  /  DBili  x   /  AST  55<H>  /  ALT  15  /  AlkPhos  101  20    CAPILLARY BLOOD GLUCOSE        LIVER FUNCTIONS - ( 2020 15:35 )  Alb: 3.7 g/dL / Pro: 8.0 g/dL / ALK PHOS: 101 U/L / ALT: 15 U/L / AST: 55 U/L / GGT: x           PT/INR - ( 2020 15:35 )   PT: 13.2 sec;   INR: 1.14 ratio         PTT - ( 2020 15:35 )  PTT:31.8 sec  Urinalysis Basic - ( 2020 17:24 )    Color: Yellow / Appearance: Turbid / S.021 / pH: x  Gluc: x / Ketone: Negative  / Bili: Negative / Urobili: Negative   Blood: x / Protein: 300 mg/dL / Nitrite: Negative   Leuk Esterase: Large / RBC: 18 /hpf / WBC >50   Sq Epi: x / Non Sq Epi: 3 / Bacteria: Many      D DImer  Serum Pro-Brain Natriuretic Peptide: 1389 pg/mL ( @ 15:35)      Studies  Chest X-RAY  CT SCAN Chest   CT Abdomen  Venous Dopplers: LE:   Others      < from: Xray Chest 1 View- PORTABLE-Urgent (20 @ 15:31) >    EXAM:  XR CHEST PORTABLE URGENT 1V                            PROCEDURE DATE:  2020            INTERPRETATION:  CLINICAL INFORMATION: Shortness of breath.    EXAM: AP chest radiograph.    COMPARISON: Chest radiograph from 3/30/2019.    FINDINGS:  Heart size cannot be properly assessed in this projection.  No focal consolidations. No evidence of pleural effusion or pneumothorax.  The visualized osseous structures demonstrate no acute pathology.    IMPRESSION:  Clear lungs.            < from: CT Angio Chest w/ IV Cont (18 @ 20:13) >  PROCEDURE:   CT Angiography of the Chest.  90 ml of Omnipaque 350 was injected intravenously. 10 ml were discarded.  Sagittal and coronal reformats were performed as well as 3D (MIP)   reconstructions.    FINDINGS:    CHEST:     LUNGS, PLEURA, AND LARGE AIRWAYS: Complete opacification of right lower   lobe airway and bronchus intermedius with near complete collapse of right   lower lobe. Mild atelectasis of the left lower lobe.     VESSELS: No main, right, left, lobar, or segmental pulmonary embolus.   Limited evaluation of subsegmental pulmonary arteries due to respiratory   motion artifact. Calcific atherosclerosis of aorta.    HEART: Cardiomegaly. No pericardial effusion. Mitral valve annular   calcification. Aortic valve calcification.    MEDIASTINUM AND DANILO: No lymphadenopathy. Moderate hiatal hernia.    CHEST WALL AND LOWER NECK: Within normal limits.    VISUALIZED UPPER ABDOMEN: 1.1 cm hepatic cyst at the dome (5, 97). Status   post cholecystectomy. Bilateral adrenal gland thickening.    BONES: Within normal limits.    IMPRESSION:   Suboptimal evaluation for PEin the subsegmental arteries due to   respiratory motion artifact. Otherwise, no PE.   Opacification of bronchus intermedius and right lower lobe airway with   resultant near complete collapse of right lower lobe.                    GERRY DESAI M.D., RADIOLOGY RESIDENT  This document has been electronically signed.  SANIYA COLEMAN M.D. ATTENDING RADIOLOGIST    < end of copied text >      IGOR SMITH M.D., RADIOLOGY RESIDENT  This document has been electronically signed.  THOR VIDAL M.D., ATTENDING RADIOLOGIST  This document has been electronically signed. 2020 10:29AM    < end of copied text >

## 2020-01-22 NOTE — CONSULT NOTE ADULT - PROBLEM SELECTOR RECOMMENDATION 2
On Rocephin  Of note patient with extensive history of CDiff Colitis.  Daughter/HCP concerned patient will be re infected if on long term antibiotics  Discussed with Dr Brambila  Cultures pending

## 2020-01-22 NOTE — CONSULT NOTE ADULT - ASSESSMENT
89 year old female with pmhx AFib, CAD, HLD, HTN, OA, GERD, dementia presents to the ED c/o sudden onset SOB starting x45 minutes ago. +cough. Not currently on blood thinners. Took 81mg aspirin today. Pt [facility] from [facility]. Denies CP.  Placed on BiPAP in ED. DN DNI status verified (20 Jan 2020 23:19)    hiskd [id number]: DW NP on floor as well as some with pt: Pt is alert and awake: chart reviewed     triage note reviewed: she was sob at NH for about 40 minutes: and she suddenly became SOB: she is found to have viral infection too: She s alert and awake

## 2020-01-22 NOTE — CONSULT NOTE ADULT - SUBJECTIVE AND OBJECTIVE BOX
HPI:  89 year old female with pmhx AFib, CAD, HLD, HTN, OA, GERD, dementia presents to the ED c/o sudden onset SOB starting x45 minutes ago. +cough. Not currently on blood thinners. Took 81mg aspirin today. Pt BIBEMS from [place]. Denies CP.  Placed on BiPAP in ED. DN DNI status verified (2020 23:19)    PERTINENT PM/SXH:   Clostridium difficile diarrhea  Hyperlipidemia, unspecified hyperlipidemia type  Coronary artery disease involving native coronary artery of native heart without angina pectoris  Atrial fibrillation, unspecified type  GERD (gastroesophageal reflux disease)  HTN (hypertension)  Dementia    No significant past surgical history    FAMILY HISTORY:  No pertinent family history in first degree relatives    ITEMS NOT CHECKED ARE NOT PRESENT    SOCIAL HISTORY:   Significant other/partner[ ]  Children[ X]  Mormon/Spirituality:  Substance hx:  [ ]   Tobacco hx:  [ ]   Alcohol hx: [ ]   Home Opioid hx:  [ ] I-Stop Reference No:  Living Situation: [ ]Home  [ ]Long term care  [ ]Rehab [X ]Other BRISTAL ASSISTED LIVING     ADVANCE DIRECTIVES:    DNR  Yes  MOLST  [ X]  Living Will  [ ]   DECISION MAKER(s):  [ X] Health Care Proxy(s)  [ ] Surrogate(s)  [ ] Guardian           Name(s): Phone Number(s):  ERIK VANEGAS :  DAUGHTER   BASELINE (I)ADL(s) (prior to admission):  [place]: [ ]Total  [ ] Moderate [ X]Dependent    Allergies    No Known Allergies    Intolerances    MEDICATIONS  (STANDING):  amLODIPine   Tablet 5 milliGRAM(s) Oral daily  cefTRIAXone   IVPB 1000 milliGRAM(s) IV Intermittent every 24 hours  clopidogrel Tablet 75 milliGRAM(s) Oral daily  digoxin     Tablet 0.125 milliGRAM(s) Oral daily  enalapril 2.5 milliGRAM(s) Oral daily  escitalopram 15 milliGRAM(s) Oral daily  heparin  Injectable 5000 Unit(s) SubCutaneous two times a day  latanoprost 0.005% Ophthalmic Solution 1 Drop(s) Both EYES at bedtime  methylPREDNISolone sodium succinate Injectable 20 milliGRAM(s) IV Push every 8 hours  metoprolol tartrate 25 milliGRAM(s) Oral every 12 hours  pantoprazole    Tablet 40 milliGRAM(s) Oral before breakfast  timolol 0.5% Solution 1 Drop(s) Left EYE daily    MEDICATIONS  (PRN):  acetaminophen   Tablet .. 650 milliGRAM(s) Oral every 6 hours PRN Temp greater or equal to 38C (100.4F), Moderate Pain (4 - 6)  albuterol/ipratropium for Nebulization. 3 milliLiter(s) Nebulizer every 6 hours PRN Shortness of Breath and/or Wheezing  polyethylene glycol 3350 17 Gram(s) Oral daily PRN Constipation    PRESENT SYMPTOMS: [ ]Unable to obtain due to poor mentation   Source if other than patient:  [ ]Family   [ ]Team     Pain: [ ]yes [X ]no  QOL impact -   Location -                    Aggravating factors -  Quality -  Radiation -  Timing-  Severity (0-10 scale):  Minimal acceptable level (0-10 scale):     PAIN AD Score:     [email or web address]/[email or web address] (press ctrl +  left click to view)    Dyspnea:                           [ ]Mild [X ]Moderate [ ]Severe  Anxiety:                             [ ]Mild [ ]Moderate [ ]Severe  Fatigue:                             [ ]Mild [ ]Moderate [ ]Severe  Nausea:                             [ ]Mild [ ]Moderate [ ]Severe  Loss of appetite:              [ ]Mild [ ]Moderate [ ]Severe  Constipation:                    [ ]Mild [ ]Moderate [ ]Severe    Other Symptoms:  [X ]All other review of systems negative     Palliative Performance Status Version 2:     30    %    http://npcrc.org/files/news/palliative_performance_scale_ppsv2.pdf  PHYSICAL EXAM:  Vital Signs Last 24 Hrs  T(C): 36.7 (2020 03:51), Max: 36.7 (2020 16:38)  T(F): 98 (2020 03:51), Max: 98 (2020 16:38)  HR: 72 (2020 10:29) (66 - 88)  BP: 137/70 (2020 10:29) (137/70 - 169/80)  BP(mean): --  RR: 19 (2020 03:51) (19 - 20)  SpO2: 95% (2020 10:29) (95% - 100%) I&O's Summary    2020 07:01  -  2020 07:00  --------------------------------------------------------  IN: 25 mL / OUT: 100 mL / NET: -75 mL      GENERAL:  [X ]Alert & PLEASANTLY CONFUSED  [ X]Oriented x 1  [ ]Lethargic  [X ]Cachexia  [ ]Unarousable  [ X]Verbal  [ ]Non-Verbal  Behavioral:   [ ] Anxiety  [ ] Delirium [ ] Agitation [ ] Other  HEENT:  [ ]Normal   [X ]Dry mouth   [ ]ET Tube/Trach  [ ]Oral lesions  PULMONARY:   [ ]Clear [X ]Tachypnea  [ ]Audible excessive secretions   [ ]Rhonchi        [ ]Right [ ]Left [ ]Bilateral  [ ]Crackles        [ ]Right [ ]Left [ ]Bilateral  [ ]Wheezing     [ ]Right [ ]Left [ ]Bilatera  [X ]Diminished breath sounds [ ]right [ ]left [ ]bilateral  CARDIOVASCULAR:    [ ]Regular [ X]Irregular [ ]Tachy  [ ]John [ ]Murmur [ ]Other  GASTROINTESTINAL:  [X ]Soft  [ ]Distended   [ X]+BS  [ ]Non tender [ ]Tender  [ ]PEG [ ]OGT/ NGT  Last BM:     GENITOURINARY:  [ ]Normal [X ] Incontinent   [ ]Oliguria/Anuria   [ ]Shook  MUSCULOSKELETAL:   [ ]Normal   [ ]Weakness  [ X]Bed/Wheelchair bound [ ]Edema  NEUROLOGIC:   [ ]No focal deficits  [X ]Cognitive impairment  [ ]Dysphagia [ ]Dysarthria [ ]Paresis [ ]Other   SKIN:   [ ]Normal    [ ]Rash  [ ]Pressure ulcer(s)       Present on admission [ ]y [ ]n    CRITICAL CARE:  [ ] Shock Present  [ ]Septic [ ]Cardiogenic [ ]Neurologic [ ]Hypovolemic  [ ]  Vasopressors [ ]  Inotropes   [X ]Respiratory failure present [ ]Mechanical ventilation [X ]Non-invasive ventilatory support [ ]High flow  [ ]Acute  [ ]Chronic [ ]Hypoxic  [ ]Hypercarbic [ ]Other  [ ]Other organ failure     LABS:                        14.7   12.92 )-----------( 244      ( 2020 08:27 )             46.3       139  |  103  |  16  ----------------------------<  81  3.5   |  23  |  0.52    Ca    9.1      2020 08:27  Phos  2.9     01-22  Mg     2.0         TPro  8.0  /  Alb  3.7  /  TBili  0.7  /  DBili  x   /  AST  55<H>  /  ALT  15  /  AlkPhos  101    PT/INR - ( 2020 15:35 )   PT: 13.2 sec;   INR: 1.14 ratio         PTT - ( 2020 15:35 )  PTT:31.8 sec    Urinalysis Basic - ( 2020 17:24 )    Color: Yellow / Appearance: Turbid / S.021 / pH: x  Gluc: x / Ketone: Negative  / Bili: Negative / Urobili: Negative   Blood: x / Protein: 300 mg/dL / Nitrite: Negative   Leuk Esterase: Large / RBC: 18 /hpf / WBC >50   Sq Epi: x / Non Sq Epi: 3 / Bacteria: Many      RADIOLOGY & ADDITIONAL STUDIES:    < from: Xray Chest 1 View- PORTABLE-Urgent (20 @ 15:31) >      INTERPRETATION:  CLINICAL INFORMATION: Shortness of breath.    EXAM: AP chest radiograph.    COMPARISON: Chest radiograph from 3/30/2019.    FINDINGS:  Heart size cannot be properly assessed in this projection.  No focal consolidations. No evidence of pleural effusion or pneumothorax.  The visualized osseous structures demonstrate no acute pathology.    IMPRESSION:  Clear lungs.      < end of copied text >      PROTEIN CALORIE MALNUTRITION PRESENT: [ ]mild [ ]moderate [ ]severe [ ]underweight [ ]morbid obesity  https://www.andeal.org/vault/2440/web/files/ONC/Table_Clinical%20Characteristics%20to%20Document%20Malnutrition-White%20JV%20et%20al%436975.pdf    Height (cm): 160 (20 @ 22:35), 162.56 (19 @ 14:37), 154.94 (19 @ 11:10)  Weight (kg): 49 (20 @ 22:35), 46.7 (19 @ 14:37), 48.7 (19 @ 11:10)  BMI (kg/m2): 19.1 (20 @ 22:35), 17.7 (19 @ 14:37), 20.3 (19 @ 11:10)    [ ]PPSV2 < or = to 30% [ ]significant weight loss  [ ]poor nutritional intake  [ ]anasarca     Albumin, Serum: 3.7 g/dL (20 @ 15:35)   [ ]Artificial Nutrition      REFERRALS:   [ ]Chaplaincy  [ ]Hospice  [ ]Child Life  [ ]Social Work  [ X]Case management [ ]Holistic Therapy     Goals of Care Document: NANCI Chinchilla (19 @ 07:50)  Goals of Care Conversation:   Advance Directives:  · Does patient have Advance Directive  No  on arrival to ED    Conversation Discussion:  · Conversation Details  discussed briefly goals of care w HCP/ daughter erik vanegas who lives in [place] - states has hcp forms. also states there is a 'form on the fridge where she lives' which indicates DNR/I which was not sent along w the paperwork from the [facility].   confirms pt is DNR/I but agrees w abx at this time for poss aspiration pna.   states knows pt has hx of aspiration and confirms that comfort feeds were elected on past admissions in setting of likely recurrent aspiration.   MOLST form completed.      Electronic Signatures:  Seda Chinchilla)  (Signed 30-Mar-2019 07:52)  	Authored: Goals of Care Conversation      Last Updated: 30-Mar-2019 07:52 by Seda Chinchilla)

## 2020-01-24 NOTE — CHART NOTE - NSCHARTNOTEFT_GEN_A_CORE
Called by RN on pt sleeping all day, not taking any food or medications,  become restless and resistant to care. pt seen and examined at bedside. Observed in bed, sleeping, is arousable, opens eyes, verbally responsive. knows that she is in the hospital. Lung sound congested, RR 24 on exam. Blood work reviewed and vital signs reviewed. Worsening respiratory Alkalosis on ABGA, Dr. Amador suggested CTA of chest. Spoke with daughter Mahi over the phone. She does not want CTA of chest as she would not want pt to be on anticoagulation even if the test result were positive.  CXR done in am shows Left small pleural effusion.    Vital Signs Last 24 Hrs  T(C): 36.3 (24 Jan 2020 11:57), Max: 36.3 (24 Jan 2020 11:57)  T(F): 97.3 (24 Jan 2020 11:57), Max: 97.3 (24 Jan 2020 11:57)  HR: 60 (24 Jan 2020 11:57) (60 - 86)  BP: 147/79 (24 Jan 2020 11:57) (147/79 - 155/79)  BP(mean): --  RR: 16 (24 Jan 2020 11:57) (16 - 18)  SpO2: 95% (24 Jan 2020 11:57) (94% - 95%)      Labs:                          14.4   15.51 )-----------( 356      ( 24 Jan 2020 08:28 )             45.2     01-24    142  |  105  |  41<H>  ----------------------------<  161<H>  3.3<L>   |  22  |  0.63    Ca    8.9      24 Jan 2020 05:50  Mg     2.2     01-24      ABG - ( 24 Jan 2020 06:50 )  pH, Arterial: 7.52  pH, Blood: x     /  pCO2: 32    /  pO2: 68    / HCO3: 26    / Base Excess: 3.8   /  SaO2: 91        Radiology:  < from: Xray Chest 1 View- PORTABLE-Urgent (01.24.20 @ 10:10) >    Comparison is made to prior study of 4 days prior.    A single frontal view of the chest demonstrates no evidence of consolidation or pneumothorax. There is small left effusion.      < end of copied text >        Physical Exam:  General: tachypneic, sleeping, arousable  Neurology: Confused, follows simple command  Head:  Normocephalic, atraumatic  Respiratory: B/L congested   CV: RRR, S1S2  Abdominal: Soft, NT, ND no palpable mass      Assessment & Plan:    89 year old female with pmhx AFib, CAD, HLD, HTN, OA, GERD, dementia presents to the ED  with acute respiratory failure, found to be RSV positive. Now off Bipap, O2 sat 94-95% on RA.  Seen for AMS. pt has dementia, baseline confused. Today, she is sleeping the most of the day, not eating, not taking medications, resistant to care.     - not able to use Acapella, will switch to chest vest q 6 hours   - Continue supportive care with Neb treatment q 6 hours and Mucinex BID and Solumedrol IV  - Continue Supplemental oxygen based on blood gas results  - No plan for CTA of chest as daughter does not want the test  - ? aspiration- will change diet to dysphagia 1 with honey consistency  - Will check fingerstick q 6 hours as she is not eating now  - Gentle hydration with D5NS at 40 ml/hr for 1 Liter only  - Change Antibiotic to Zosyn as there is concern for aspiration   - Ongoing GOC discussion with family  - Code status : DNR and DNI  - Will repeat CBC with diff, and blood gas in am   - Plan discussed with DR. Tri Cerrato Np-C   Department of Medicine   #[id number]

## 2020-01-27 NOTE — DIETITIAN INITIAL EVALUATION ADULT. - PERTINENT MEDS FT
MEDICATIONS  (PRN):  acetaminophen   Tablet .. 650 milliGRAM(s) Oral every 6 hours PRN Temp greater or equal to 38C (100.4F), Moderate Pain (4 - 6)  polyethylene glycol 3350 17 Gram(s) Oral daily PRN Constipation

## 2020-01-27 NOTE — DIETITIAN INITIAL EVALUATION ADULT. - ADD RECOMMEND
1) RD to add honey-thick mighty shakes, as pt previously disliked Ensure Enlive, to increase protein and energy intake. 2) Monitor PO intake, RD is available to honor pt's food preferences as needed 3) 1) RD to add honey-thick mighty shakes, as pt previously disliked Ensure Enlive, to increase protein and energy intake. 2) Monitor PO intake, RD is available to honor pt's food preferences as needed 3) Malnutrition alert placed in chart

## 2020-01-27 NOTE — DIETITIAN INITIAL EVALUATION ADULT. - PHYSICAL APPEARANCE
other (specify) Ht: 63 inches Wt: 101 pounds BMI: 17.9 kg/m2 IBW: 115 pounds(+/-10%) 88%IBW  no edema. no pressure ulcers documented. IAD noted Nutrition-focused physical exam inappropriate at this time due to mental status. Visually observed moderate muscle wasting of temples, severe muscle wasting of clavicles. Noted pt is wheelchair bound./other (specify)

## 2020-01-27 NOTE — CHART NOTE - NSCHARTNOTEFT_GEN_A_CORE
Upon Nutritional Assessment by the Registered Dietitian your patient was determined to meet criteria / has evidence of the following diagnosis/diagnoses:          [ ]  Mild Protein Calorie Malnutrition        [x]  Moderate Protein Calorie Malnutrition        [ ] Severe Protein Calorie Malnutrition        [ ] Unspecified Protein Calorie Malnutrition        [x] Underweight / BMI <19        [ ] Morbid Obesity / BMI > 40      Findings as based on:  [x] Comprehensive nutrition assessment   [ ] Nutrition Focused Physical Exam  [x] Other: BMI 17.9 kg/m2    Etiology inability to meet estimated nutrient needs in setting of dementia.   Signs/Symptoms 18% wt loss x2 years, severe muscle and fat loss, <75% estimated needs in-house x7 days, BMI 17.9.     Nutrition Plan/Recommendations:    1) RD to provide pt with HC vanilla milk shake to encourage increased protein energy intake   2) Continue to monitor PO intake and tolerance.    Ernestina Tenorio RD, CDN. Pager: [phone]     PROVIDER Section:     By signing this assessment you are acknowledging and agree with the diagnosis/diagnoses assigned by the Registered Dietitian    Comments:

## 2020-01-30 NOTE — DISCHARGE NOTE PROVIDER - NSDCQMERRANDS_GEN_ALL_CORE
SW/CM Infant Discharge Plan     Informed infant is ready for discharge.   Disposition is Home with parent.   Patient to be transported by private vehicle.     Family has been counseled for post hospitalization care.     Discharge plan has been arranged, including: None at this time    Services Needed at Discharge: [facility]     Home Devices/Equipment Needed for Discharge: Breast pump    Progress Note  Met with infant's mother this morning.  Infant is medically stable for discharge home this date. No services needed upon discharge.  Infants mother does not have any questions or concerns at this time.  No needs anticipated for discharge.  No social concerns at this time. Business card for CM/SW given to the infant's mother for future reference. marilynn         Yes

## 2020-01-30 NOTE — PROVIDER CONTACT NOTE (OTHER) - BACKGROUND
Pt admitted for respiratory failure 2/2 corona virus and flu. N/w FTT, pending GOC discussion w/ palliative.

## 2020-01-30 NOTE — CHART NOTE - NSCHARTNOTEFT_GEN_A_CORE
Nutrition Follow Up Note  Patient seen for malnutrition follow-up.     Chart reviewed, events noted. Per chart, 89 y/o female admitted with viral illness, respiratory failure. "GOC discussed with family by palliative care team and primary care team. She is DNR and DNI. Daughter erik want pt to go back [facility], memory unit on regular diet."  PMH: AFib, CAD, HLD, HTN, OA, GERD, dementia     Source:  RN, EMR. Pt asleep at both times of visit, also AAOx0-1 ()    Diet : Regular, to be discharged to [facility] on regular diet    Patient unable to participate in interview. RN states pt had one bite of breakfast, flow sheets indicate 5-50% PO intake in the past few days. Per previous notes, pt dislikes Ensure. Pt was provided with HC mighty shakes this admission.     PO intake : Poor per flow sheets and RN  Daily Weight in k.1 (), Weight in k.9 (), Weight in k.5 ()  % Weight Change    Pertinent Medications: MEDICATIONS  (STANDING):  albuterol/ipratropium for Nebulization. 3 milliLiter(s) Nebulizer every 6 hours  amLODIPine   Tablet 5 milliGRAM(s) Oral daily  clopidogrel Tablet 75 milliGRAM(s) Oral daily  digoxin     Tablet 0.125 milliGRAM(s) Oral daily  enalapril 5 milliGRAM(s) Oral daily  escitalopram 15 milliGRAM(s) Oral daily  guaiFENesin  milliGRAM(s) Oral every 12 hours  heparin  Injectable 5000 Unit(s) SubCutaneous two times a day  latanoprost 0.005% Ophthalmic Solution 1 Drop(s) Both EYES at bedtime  pantoprazole    Tablet 40 milliGRAM(s) Oral before breakfast  potassium chloride    Tablet ER 40 milliEquivalent(s) Oral every 4 hours  timolol 0.5% Solution 1 Drop(s) Left EYE daily    MEDICATIONS  (PRN):  acetaminophen   Tablet .. 650 milliGRAM(s) Oral every 6 hours PRN Temp greater or equal to 38C (100.4F), Moderate Pain (4 - 6)  polyethylene glycol 3350 17 Gram(s) Oral daily PRN Constipation    Pertinent Labs:  @ 06:04: Na 145, BUN 23, Cr 0.52, <H>, K+ 3.1<L>, Phos --, Mg --, Alk Phos --, ALT/SGPT --, AST/SGOT --, HbA1c --    Skin per nursing documentation: no pressure injuries. IAD noted  Edema: none    Estimated Needs:   [x] no change since previous assessment  [ ] recalculated:     Previous Nutrition Diagnosis: moderate malnutrition  Nutrition Diagnosis is: ongoing, addressed with nutrition supplementation- mighty shakes.     New Nutrition Diagnosis: none    Recommend  1) Consider adding Ensure pudding to optimize pt's nutrition intake- pt previously disliked Ensure Enlive.    2) Continue to monitor PO intake, RD is available to make dietary adjustments as needed.    Monitoring and Evaluation:   Continue to monitor Nutritional intake, Tolerance to diet prescription, weights, labs, skin integrity    Ernestina Tenorio RD, CDN  Pager [phone]  RD remains available upon request and will follow up per protocol

## 2020-01-30 NOTE — DISCHARGE NOTE PROVIDER - HOSPITAL COURSE
89 year old female with pmhx AFib, CAD, HLD, HTN, OA, GERD, dementia presents to the ED c/o sudden onset SOB and +cough. Not currently on blood thinners. Pt BIBEMS from [facility], memory unit, Wheelchair bound. Found to have RSV. Placed on Bipap in ER. Supportive care provided with Systemic steroid, duoneb treatment, chest PT, supplemental oxygen. Ceftriaxone initiated for UTI.    Antibiotic switched to Zosyn concerning for aspiration and worsening  respiratory status. CXR without evidence of pneumonia     s/p Antibiotic and off systemic steroid.     CTA of chest suggested by pulmonary, however daughter declined. Daughter did not want swallowing evaluation.     GOC discussed with family by palliative care team and primary care team. She is DNR and DNI. Daughter erik want pt to go back [facility] on regular diet.

## 2020-01-30 NOTE — DISCHARGE NOTE PROVIDER - NSDCMRMEDTOKEN_GEN_ALL_CORE_FT
acetaminophen 325 mg oral tablet: 2 tab(s) orally every 6 hours, As needed, Temp greater or equal to 38C (100.4F), Moderate Pain (4 - 6)  amLODIPine 5 mg oral tablet: 1 tab(s) orally once a day  clopidogrel 75 mg oral tablet: 1 tab(s) orally once a day  digoxin 125 mcg (0.125 mg) oral tablet: 1 tab(s) orally once a day  enalapril 2.5 mg oral tablet: 1 tab(s) orally once a day  escitalopram: 15  orally once a day  latanoprost 0.005% ophthalmic solution: 1 drop(s) to each affected eye once a day (at bedtime)  metoprolol tartrate 25 mg oral tablet: 1 tab(s) orally every 12 hours  omeprazole 20 mg oral delayed release capsule: 1 cap(s) orally once a day  polyethylene glycol 3350 oral powder for reconstitution: 17 gram(s) orally once a day  potassium chloride 20 mEq oral powder for reconstitution: 1 packet(s) orally 3 times a week  (Mon-Wed-Fri)  senna oral tablet: 2 tab(s) orally once a day (at bedtime)  timolol maleate 0.5% ophthalmic gel forming solution: 1 drop(s) in the left eye once a day

## 2020-01-30 NOTE — PROVIDER CONTACT NOTE (OTHER) - ACTION/TREATMENT ORDERED:
12 lead EKG done. Will continue to monitor
12-lead EKG performed, NP aware. Will continue to monitor.
Educated patient on importance of monitoring blood glucose level as patient is on iv steroid, patient verbally understands and still refusing; NP aware
NP aware, no need to recheck ABG

## 2020-01-30 NOTE — PROVIDER CONTACT NOTE (OTHER) - ASSESSMENT
Vitals stable. See flow sheet
Pt AAOx0-1, lethargic. VSS. Pt unable to verbalize subjective assessment 2/2 confusion.
pt has MORALES, coarse crackles
vss, no c/o hyperglycemia

## 2020-01-30 NOTE — CHART NOTE - NSCHARTNOTEFT_GEN_A_CORE
MEDICINE NP-EPISODIC NOTE    Notified by RN patient with episode of second degree type 1 AVB, pt sleeping, VSS. Tele strip reviewed. EKG shows AF. Patient on lopressor, will hold AM dose if HR <60.    Lara Patrick, [facility]-BC  [id number] MEDICINE NP-EPISODIC NOTE    Notified by RN patient with episode of second degree type 1 AVB Hr 50s, pt sleeping, VSS. Tele strip reviewed. EKG shows AF. Patient on lopressor, will hold AM dose if HR <60.    Lara Patrick, [facility]-BC  [id number]

## 2020-01-30 NOTE — DISCHARGE NOTE PROVIDER - NSDCCPCAREPLAN_GEN_ALL_CORE_FT
PRINCIPAL DISCHARGE DIAGNOSIS  Diagnosis: Acute respiratory failure with hypoxemia  Assessment and Plan of Treatment: Acute respiratory failure with hypoxemia      SECONDARY DISCHARGE DIAGNOSES  Diagnosis: UTI (urinary tract infection)  Assessment and Plan of Treatment: UTI (urinary tract infection)    Diagnosis: Viral illness  Assessment and Plan of Treatment: Viral illness    Diagnosis: Dementia without behavioral disturbance, unspecified dementia type  Assessment and Plan of Treatment: Dementia without behavioral disturbance, unspecified dementia type    Diagnosis: Benign essential hypertension  Assessment and Plan of Treatment: Benign essential hypertension PRINCIPAL DISCHARGE DIAGNOSIS  Diagnosis: Acute respiratory failure with hypoxemia  Assessment and Plan of Treatment: Found to have RSV.   s/p systemic steroid  s/p IV antibiotic for concern for aspiration   Aspiration precaution   Continue pleasure feed as family wanted   Please follow up with your primay care physician      SECONDARY DISCHARGE DIAGNOSES  Diagnosis: UTI (urinary tract infection)  Assessment and Plan of Treatment: Completed a course of IV antibiotic    Diagnosis: Viral illness  Assessment and Plan of Treatment: Supporitve care provided    Diagnosis: Dementia without behavioral disturbance, unspecified dementia type  Assessment and Plan of Treatment: Continue supportive care  as assisted living    Diagnosis: Benign essential hypertension  Assessment and Plan of Treatment: Continue current medications as directed

## 2020-01-31 NOTE — GOALS OF CARE CONVERSATION - ADVANCED CARE PLANNING - CONVERSATION DETAILS
[facility] - TC to the Patient's Daughter Mahi Kirkpatrick (lives in [place] / [phone]). [facility] services, POC discussed. Pt approved for home hospice, return to [facility] in [place]. Telephone message left for CM.  Consents emailed to the Patient's Daughter Sue Hopkins ([phone]) @ [email or web address]. from the [facility] [place] office. TC to [facility] - Spoke jordyn/Patricia from [facility] [phone]), regarding the Patient's return to the facility. Will call Nisha Hancock/Yg on Monday, for d/c-admit instructions.

## 2020-02-01 NOTE — PROGRESS NOTE ADULT - ASSESSMENT
89 year old female with pmhx AFib, CAD, HLD, HTN, OA, GERD, dementia presents to the ED c/o sudden onset SOB starting x45 minutes ago. +cough. Not currently on blood thinners. Took 81mg aspirin today. Pt [facility] from [facility]. Denies CP.  Placed on BiPAP in ED. DN DNI status verified (20 Jan 2020 23:19)    hiskd [id number]: DW NP on floor as well as some with pt: Pt is alert and awake: chart reviewed     triage note reviewed: she was sob at NH for about 40 minutes: and she suddenly became SOB: she is found to have viral infection too: She s alert and awake
89 year old female with pmhx AFib, CAD, HLD, HTN, OA, GERD, dementia presents to the ED c/o sudden onset SOB starting x45 minutes ago. +cough. Not currently on blood thinners. Took 81mg aspirin today. Pt [facility] from [facility]. Denies CP.  Placed on BiPAP in ED. DN DNI status verified (20 Jan 2020 23:19)    hiskd [id number]: DW NP on floor as well as some with pt: Pt is alert and awake: chart reviewed     triage note reviewed: she was sob at NH for about 40 minutes: and she suddenly became SOB: she is found to have viral infection too: She s alert and awake
89 year old female with pmhx AFib, CAD, HLD, HTN, OA, GERD, dementia presents to the ED c/o sudden onset SOB starting x45 minutes ago. +cough. Not currently on blood thinners. Took 81mg aspirin today. Pt [facility] from [facility]. Denies CP.  Placed on BiPAP in ED. DN DNI status verified (20 Jan 2020 23:19)    hiskd [id number]: DW NP on floor as well as some with pt: Pt is alert and awake: chart reviewed     triage note reviewed: she was sob at NH for about 40 minutes: and she suddenly became SOB: she is found to have viral infection too: She s alert and awake
89 year old female with pmhx AFib, CAD, HLD, HTN, OA, GERD, dementia presents to the ED c/o sudden onset SOB starting x45 minutes ago. +cough. Not currently on blood thinners. Took 81mg aspirin today. Pt [facility] from [facility]. Denies CP.  Placed on BiPAP in ED. DN DNI status verified (20 Jan 2020 23:19)    hiskd [id number]: DW NP on floor as well as some with pt: Pt is alert and awake: chart reviewed     triage note reviewed: she was sob at NH for about 40 minutes: and she suddenly became SOB: she is found to have viral infection too: She s alert and awake
89 year old female with pmhx AFib, CAD, HLD, HTN, OA, GERD, dementia presents to the ED c/o sudden onset SOB starting x45 minutes ago. +cough. Not currently on blood thinners. Took 81mg aspirin today. Pt [facility] from [facility]. Denies CP.  Placed on BiPAP in ED. DN DNI status verified (20 Jan 2020 23:19)    hiskd [id number]: DW NP on floor as well as some with pt: Pt is alert and awake: chart reviewed     triage note reviewed: she was sob at NH for about 40 minutes: and she suddenly became SOB: she is found to have viral infection too: She s alert and awake
89 year old female with pmhx AFib, CAD, HLD, HTN, OA, GERD, dementia presents to the ED c/o sudden onset SOB starting x45 minutes ago. +cough. Not currently on blood thinners. Took 81mg aspirin today. Pt [facility] from [facility]. Denies CP.  Placed on BiPAP in ED. DN DNI status verified (20 Jan 2020 23:19)    hiskd [id number]: DW NP on floor as well as some with pt: Pt is alert and awake: chart reviewed     triage note reviewed: she was sob at NH for about 40 minutes: and she suddenly became SOB: she is found to have viral infection too: She s alert and awake
89 year old female with pmhx AFib, CAD, HLD, HTN, OA, GERD, dementia presents to the ED c/o sudden onset SOB starting x45 minutes ago. +cough. Not currently on blood thinners. Took 81mg aspirin today. Pt [facility] from [facility]. Denies CP.  Placed on BiPAP in ED. DN DNI status verified (20 Jan 2020 23:19)    hiskd [id number]: DW NP on floor as well as some with pt: Pt is alert and awake: chart reviewed     triage note reviewed: she was sob at NH for about 40 minutes: and she suddenly became SOB: she is found to have viral infection too: She s alert and awake
89 year old female with pmhx AFib, CAD, HLD, HTN, OA, GERD, dementia presents to the ED c/o sudden onset SOB starting x45 minutes ago. +cough. Not currently on blood thinners. Took 81mg aspirin today. Pt [facility] from [facility]. Denies CP.  Placed on BiPAP in ED. DN DNI status verified (20 Jan 2020 23:19)    hiskd [id number]: DW NP on floor as well as some with pt: Pt is alert and awake: chart reviewed     triage note reviewed: she was sob at NH for about 40 minutes: and she suddenly became SOB: she is found to have viral infection too: She s alert and awake
89 Y/O pmh  AFib, CAD, HLD, HTN, OA, GERD, dementia admitted with sudden onset SOB, +RSV, +UTI called for PCU eval.
89 year old female with pmhx AFib, CAD, HLD, HTN, OA, GERD, dementia presents to the ED c/o sudden onset SOB starting x45 minutes ago. +cough. Not currently on blood thinners. Took 81mg aspirin today. Pt [facility] from [facility]. Denies CP.  Placed on BiPAP in ED. DN DNI status verified (20 Jan 2020 23:19)    hiskd [id number]: DW NP on floor as well as some with pt: Pt is alert and awake: chart reviewed     triage note reviewed: she was sob at NH for about 40 minutes: and she suddenly became SOB: she is found to have viral infection too: She s alert and awake

## 2020-02-01 NOTE — PROGRESS NOTE ADULT - PROBLEM SELECTOR PLAN 1
Improved
Needs supportive care for now.  Use BiPAP as necessary  Steroids added as per Pulm consul
Needs supportive care for now.  Use BiPAP as necessary  Steroids as per Pulm consul
Needs supportive care for now.Use BiPAP as necessary
Patient is DNR/ DNI  Chest X Ray is clear again  Prob has underlying COPD, now with hypoxia. Needs home Oxygen
She may have aspiration  Use BiPAP as necessary  Steroids as per Pulm consul  Zosyn ordered. this will cover UTI as well.
She may have aspiration  Use BiPAP as necessary  Steroids as per Pulm consul  Zosyn ordered. this will cover UTI as well.
No evident major distress at the time of this eval.   Suctioning was provided and the patient's gurgling improved. She was also able to cleared out her voice.   Continue Dysphagia I diet and aspiration precautions.   Agree with Rubinol 0.4 mg q 6 PRN
rsv infection:
rsv infection:  1/24: getting more tachypneic as well as more resp alkalotic: DO CTA
rsv infection:  1/24: getting more tachypneic as well as more resp alkalotic: DO CTA  1/25: pts daughter refused for cta: cont steroids at this time: chest x-ray noted: antibiotics changed for suspected aspiration:
rsv infection:  1/24: getting more tachypneic as well as more resp alkalotic: DO CTA  1/25: pts daughter refused for cta: cont steroids at this time: chest x-ray noted: antibiotics changed for suspected aspiration:  1/26: chest x-ray is clear: not using bipap : not much of wheezing: resp status looks a little better: cotn antibiotics
rsv infection:  1/24: getting more tachypneic as well as more resp alkalotic: DO CTA  1/25: pts daughter refused for cta: cont steroids at this time: chest x-ray noted: antibiotics changed for suspected aspiration:  1/26: chest x-ray is clear: not using bipap : not much of wheezing: resp status looks a little better: cotn antibiotics  1/27: finishing antibiotics: wbc is high: no fever: ? is she is recurrently aspirating
rsv infection:  1/24: getting more tachypneic as well as more resp alkalotic: DO CTA  1/25: pts daughter refused for cta: cont steroids at this time: chest x-ray noted: antibiotics changed for suspected aspiration:  1/26: chest x-ray is clear: not using bipap : not much of wheezing: resp status looks a little better: cotn antibiotics  1/27: finishing antibiotics: wbc is high: no fever: ? is she is recurrently aspirating
rsv infection:  1/24: getting more tachypneic as well as more resp alkalotic: DO CTA  1/25: pts daughter refused for cta: cont steroids at this time: chest x-ray noted: antibiotics changed for suspected aspiration:  1/26: chest x-ray is clear: not using bipap : not much of wheezing: resp status looks a little better: cotn antibiotics  1/27: finishing antibiotics: wbc is high: no fever: ? is she is recurrently aspirating  1/29:sheisnot wheezing: but is unable to clear her secretions: on chest vest; Yest chest x-ray was clear; except left basilar atelectasis
rsv infection:  1/24: getting more tachypneic as well as more resp alkalotic: DO CTA  1/25: pts daughter refused for cta: cont steroids at this time: chest x-ray noted: antibiotics changed for suspected aspiration:  1/26: chest x-ray is clear: not using bipap : not much of wheezing: resp status looks a little better: cotn antibiotics  1/27: finishing antibiotics: wbc is high: no fever: ? is she is recurrently aspirating  1/29:sheisnot wheezing: but is unable to clear her secretions: on chest vest; Yest chest x-ray was clear; except left basilar atelectasis  1/30: resp wise seems stable: but remains at high risk for aspiration
rsv infection:  1/24: getting more tachypneic as well as more resp alkalotic: DO CTA  1/25: pts daughter refused for cta: cont steroids at this time: chest x-ray noted: antibiotics changed for suspected aspiration:  1/26: chest x-ray is clear: not using bipap : not much of wheezing: resp status looks a little better: cotn antibiotics  1/27: finishing antibiotics: wbc is high: no fever: ? is she is recurrently aspirating  1/29:sheisnot wheezing: but is unable to clear her secretions: on chest vest; Yest chest x-ray was clear; except left basilar atelectasis  1/30: resp wise seems stable: but remains at high risk for aspiration  1/31: resolved: off antibtiocs
Improved

## 2020-02-01 NOTE — PROGRESS NOTE ADULT - ATTENDING COMMENTS
Hospice referral made and she has been accepted to the hospice program  DC planning
Hospice referral made and she has been accepted to the hospice program  DC planning
Her condition was discussed with Mahi, her daughter. Hospice services were offered and Mahi was in consent. Referral made by floor Nurse Blaise
On Zosyn for pneumonia since 1/24/2020. Cont till 1/29/2020
On Zosyn for pneumonia since 1/24/2020. Cont till 1/29/2020
Palliateive care being called  1/23: she seems ot be doing better: cont steorids: cot oxygen : I don't think bipap is needed anymore:
Palliateive care being called  1/23: she seems ot be doing better: cont steorids: cot oxygen : I don't think bipap is needed anymore:  1/24: to do cta today if the family agrees
Palliateive care being called  1/23: she seems ot be doing better: cont steorids: cot oxygen : I don't think bipap is needed anymore:  1/24: to do cta today if the family agrees  1/25: as above: laureen per family: cont steroids : antibiotics changed to zosyn: pt is DNR
UTI: E Coli: On Zosyn
Will need palliative FU for GOC discussion with family
Will need palliative FU for GOC discussion with family
Palliateive care being called  1/23: she seems ot be doing better: cont steorids: cot oxygen : I don't think bipap is needed anymore:  1/24: to do cta today if the family agrees  1/25: as above: laureen per family: cont steroids : antibiotics changed to zosyn: pt is DNR  1/26: short course of antibiotics and rx uti: chest x-ray is clear: off bipap for days
Palliateive care being called  1/23: she seems ot be doing better: cont steorids: cot oxygen : I don't think bipap is needed anymore:  1/24: to do cta today if the family agrees  1/25: as above: laureen per family: cont steroids : antibiotics changed to zosyn: pt is DNR  1/26: short course of antibiotics and rx uti: chest x-ray is clear: off bipap for days  1/27: no fever; wbc is high :
Palliateive care being called  1/23: she seems ot be doing better: cont steorids: cot oxygen : I don't think bipap is needed anymore:  1/24: to do cta today if the family agrees  1/25: as above: laureen per family: cont steroids : antibiotics changed to zosyn: pt is DNR  1/26: short course of antibiotics and rx uti: chest x-ray is clear: off bipap for days  1/27: no fever; wbc is high :  1/28: seesm poor today ; wheezing mild: cont bd; off steroids: on antibiotics : to be contd for few more days
Palliateive care being called  1/23: she seems ot be doing better: cont steorids: cot oxygen : I don't think bipap is needed anymore:  1/24: to do cta today if the family agrees  1/25: as above: laureen per family: cont steroids : antibiotics changed to zosyn: pt is DNR  1/26: short course of antibiotics and rx uti: chest x-ray is clear: off bipap for days  1/27: no fever; wbc is high :  1/28: seesm poor today ; wheezing mild: cont bd; off steroids: on antibiotics : to be contd for few more days  1/29: overall not very good: on chest vest: on antibiotics till today : remains at risk for recurrent aspiration:
Palliateive care being called  1/23: she seems ot be doing better: cont steorids: cot oxygen : I don't think bipap is needed anymore:  1/24: to do cta today if the family agrees  1/25: as above: laureen per family: cont steroids : antibiotics changed to zosyn: pt is DNR  1/26: short course of antibiotics and rx uti: chest x-ray is clear: off bipap for days  1/27: no fever; wbc is high :  1/28: seesm poor today ; wheezing mild: cont bd; off steroids: on antibiotics : to be contd for few more days  1/29: overall not very good: on chest vest: on antibiotics till today : remains at risk for recurrent aspiration:  1/30; dw nop: for hospice
Palliateive care being called  1/23: she seems ot be doing better: cont steorids: cot oxygen : I don't think bipap is needed anymore:  1/24: to do cta today if the family agrees  1/25: as above: phyliciaa per family: cont steroids : antibiotics changed to zosyn: pt is DNR  1/26: short course of antibiotics and rx uti: chest x-ray is clear: off bipap for days  1/27: no fever; wbc is high :  1/28: seesm poor today ; wheezing mild: cont bd; off steroids: on antibiotics : to be contd for few more days  1/29: overall not very good: on chest vest: on antibiotics till today : remains at risk for recurrent aspiration:  1/30; shani nop: for hospice  1/321 eating orally: remains afebrile:keep on oxygen to keep sao2 above 90%

## 2020-02-01 NOTE — PROGRESS NOTE ADULT - PROBLEM SELECTOR PROBLEM 1
Acute respiratory failure with hypoxemia
Viral illness
Acute respiratory failure with hypoxemia
Viral illness

## 2020-02-01 NOTE — PROGRESS NOTE ADULT - SUBJECTIVE AND OBJECTIVE BOX
HPI:  89 year old female with pmhx AFib, CAD, HLD, HTN, OA, GERD, dementia presents to the ED c/o sudden onset SOB. She was treated for bronchospasm and possible aspiration PNA. We were called for PCU eval.       PERTINENT PM/SXH:   Clostridium difficile diarrhea  Hyperlipidemia, unspecified hyperlipidemia type  Coronary artery disease involving native coronary artery of native heart without angina pectoris  Atrial fibrillation, unspecified type  GERD (gastroesophageal reflux disease)  HTN (hypertension)  Dementia    No significant past surgical history    FAMILY HISTORY:  No pertinent family history in first degree relatives    ITEMS NOT CHECKED ARE NOT PRESENT    SOCIAL HISTORY:   Significant other/partner[ ]  Children[ X]  Evangelical/Spirituality:  Substance hx:  [ ]   Tobacco hx:  [ ]   Alcohol hx: [ ]   Home Opioid hx:  [ ] I-Stop Reference No:  Living Situation: [ ]Home  [ ]Long term care  [ ]Rehab [X ]Other BRISTAL ASSISTED LIVING     ADVANCE DIRECTIVES:    DNR  Yes  MOLST  [ X]  Living Will  [ ]   DECISION MAKER(s):  [ X] Health Care Proxy(s)  [ ] Surrogate(s)  [ ] Guardian           Name(s): Phone Number(s):  ERIK VANEGAS :  DAUGHTER   BASELINE (I)ADL(s) (prior to admission):  [place]: [ ]Total  [ ] Moderate [ X]Dependent    Allergies    No Known Allergies    Intolerances    MEDICATIONS  (STANDING):  albuterol/ipratropium for Nebulization. 3 milliLiter(s) Nebulizer every 6 hours  amLODIPine   Tablet 5 milliGRAM(s) Oral daily  clopidogrel Tablet 75 milliGRAM(s) Oral daily  digoxin     Tablet 0.125 milliGRAM(s) Oral daily  enalapril 5 milliGRAM(s) Oral daily  escitalopram 15 milliGRAM(s) Oral daily  guaiFENesin  milliGRAM(s) Oral every 12 hours  heparin  Injectable 5000 Unit(s) SubCutaneous two times a day  latanoprost 0.005% Ophthalmic Solution 1 Drop(s) Both EYES at bedtime  pantoprazole    Tablet 40 milliGRAM(s) Oral before breakfast  timolol 0.5% Solution 1 Drop(s) Left EYE daily    MEDICATIONS  (PRN):  acetaminophen   Tablet .. 650 milliGRAM(s) Oral every 6 hours PRN Temp greater or equal to 38C (100.4F), Moderate Pain (4 - 6)  glycopyrrolate Injectable 0.2 milliGRAM(s) IV Push every 6 hours PRN secreations  polyethylene glycol 3350 17 Gram(s) Oral daily PRN Constipation      PRESENT SYMPTOMS: [x ]Unable to obtain due to poor mentation   Source if other than patient:  [ ]Family   [ ]Team     Pain: [ ]yes [X ]no  QOL impact -   Location -                    Aggravating factors -  Quality -  Radiation -  Timing-  Severity (0-10 scale):  Minimal acceptable level (0-10 scale):     PAIN AD Score:0     [email or web address] (press ctrl +  left click to view)    Dyspnea:                           [ ]Mild [ ]Moderate [ ]Severe  Anxiety:                             [ ]Mild [ ]Moderate [ ]Severe  Fatigue:                             [ ]Mild [ ]Moderate [ ]Severe  Nausea:                             [ ]Mild [ ]Moderate [ ]Severe  Loss of appetite:              [ ]Mild [ ]Moderate [ ]Severe  Constipation:                    [ ]Mild [ ]Moderate [ ]Severe    Other Symptoms:  [  ]All other review of systems negative     Palliative Performance Status Version 2:     30    %    http://npcrc.org/files/news/palliative_performance_scale_ppsv2.pdf  PHYSICAL EXAM:  Vital Signs Last 24 Hrs  T(C): 36.4 (01 Feb 2020 14:17), Max: 36.6 (01 Feb 2020 00:35)  T(F): 97.6 (01 Feb 2020 14:17), Max: 97.8 (01 Feb 2020 00:35)  HR: 85 (01 Feb 2020 14:17) (85 - 108)  BP: 125/84 (01 Feb 2020 14:17) (115/66 - 153/90)  BP(mean): --  RR: 20 (01 Feb 2020 14:17) (18 - 20)  SpO2: 93% (01 Feb 2020 14:17) (88% - 94%)      GENERAL:  [X ]Alert but CONFUSED  [ X]Oriented x 1  [ ]Lethargic  [X ]Cachexia  [ ]Unarousable  [ X]Verbal  [ ]Non-Verbal  Behavioral:   [ ] Anxiety  [ ] Delirium [ ] Agitation [ ] Other  HEENT:  [ ]Normal   [X ]Dry mouth   [ ]ET Tube/Trach  [ ]Oral lesions [x] Gurgling breath sounds. [x] Frequently coughing.   PULMONARY:   [ ]Clear [ ]Tachypnea  [ ]Audible excessive secretions   [ ]Rhonchi        [ ]Right [ ]Left [ ]Bilateral  [ ]Crackles        [ ]Right [ ]Left [ ]Bilateral  [ ]Wheezing     [ ]Right [ ]Left [ ]Bilatera  [X ]Diminished breath sounds [ ]right [ ]left [ ]bilateral  CARDIOVASCULAR:    [ ]Regular [ X]Irregular [ ]Tachy  [ ]John [ ]Murmur [ ]Other  GASTROINTESTINAL:  [X ]Soft  [ ]Distended   [ X]+BS  [ ]Non tender [ ]Tender  [ ]PEG [ ]OGT/ NGT  Last BM:     GENITOURINARY:  [ ]Normal [X ] Incontinent   [ ]Oliguria/Anuria   [ ]Shook  MUSCULOSKELETAL:   [ ]Normal   [ ]Weakness  [ X]Bed/Wheelchair bound [ ]Edema  NEUROLOGIC:   [ ]No focal deficits  [X ]Cognitive impairment  [ ]Dysphagia [ ]Dysarthria [ ]Paresis [ ]Other   SKIN:   [ ]Normal    [ ]Rash  [ ]Pressure ulcer(s)       Present on admission [ ]y [ ]n    CRITICAL CARE:  [ ] Shock Present  [ ]Septic [ ]Cardiogenic [ ]Neurologic [ ]Hypovolemic  [ ]  Vasopressors [ ]  Inotropes   [X ]Respiratory failure present [ ]Mechanical ventilation [X ]Non-invasive ventilatory support [ ]High flow  [ ]Acute  [ ]Chronic [ ]Hypoxic  [ ]Hypercarbic [ ]Other  [ ]Other organ failure     LABS:                                   16.8   16.88 )-----------( 348      ( 31 Jan 2020 05:57 )             53.0   01-31    149<H>  |  114<H>  |  23  ----------------------------<  119<H>  4.1   |  21<L>  |  0.50    Ca    8.8      31 Jan 2020 05:57  Mg     2.2     01-31        RADIOLOGY & ADDITIONAL STUDIES:    < from: Xray Chest 1 View- PORTABLE-Urgent (02.01.20 @ 08:51) >    Impression:    The heart is enlarged. No acute consolidation could be identified. Calcified aortic knob. No change in the appearance of the chest when compared to previous study done January 28, 2020.                    DELLA KIRBY M.D., ATTENDING RADIOLOGIST  This document has been electronically signed. Feb 1 2020  1:15PMEXAM:  XR CHEST PORTABLE URGENT 1V                            PROCEDURE DATE:  02/01/2020      < end of copied text >          PROTEIN CALORIE MALNUTRITION PRESENT: [ ]mild [ ]moderate [ ]severe [ ]underweight [ ]morbid obesity  https://www.andeal.org/vault/2440/web/files/ONC/Table_Clinical%20Characteristics%20to%20Document%20Malnutrition-White%20JV%20et%20al%202012.pdf    Height (cm): 160 (01-21-20 @ 22:35), 162.56 (04-29-19 @ 14:37), 154.94 (04-08-19 @ 11:10)  Weight (kg): 49 (01-21-20 @ 22:35), 46.7 (04-29-19 @ 14:37), 48.7 (04-08-19 @ 11:10)  BMI (kg/m2): 19.1 (01-21-20 @ 22:35), 17.7 (04-29-19 @ 14:37), 20.3 (04-08-19 @ 11:10)    [ ]PPSV2 < or = to 30% [ ]significant weight loss  [ ]poor nutritional intake  [ ]anasarca     Albumin, Serum: 3.7 g/dL (01-20-20 @ 15:35)   [ ]Artificial Nutrition      REFERRALS:   [ ]Chaplaincy  [ ]Hospice  [ ]Child Life  [ ]Social Work  [ X]Case management [ ]Holistic Therapy     Goals of Care Document: NANCI Chinchilla (03-30-19 @ 07:50)  Goals of Care Conversation:   Advance Directives:  · Does patient have Advance Directive  No  on arrival to ED    Conversation Discussion:  · Conversation Details  discussed briefly goals of care w HCP/ daughter erik vanegas who lives in [place] - states has hcp forms. also states there is a 'form on the fridge where she lives' which indicates DNR/I which was not sent along w the paperwork from the [facility].   confirms pt is DNR/I but agrees w abx at this time for poss aspiration pna.   states knows pt has hx of aspiration and confirms that comfort feeds were elected on past admissions in setting of likely recurrent aspiration.   MOLST form completed.      Electronic Signatures:  Seda Chinchilla)  (Signed 30-Mar-2019 07:52)  	Authored: Goals of Care Conversation      Last Updated: 30-Mar-2019 07:52 by Seda Chinchilla)
Patient is a 89y old  Female who presents with a chief complaint of     SUBJECTIVE / OVERNIGHT EVENTS:  Cough ++, hypoxia worsened    Review of Systems:   CONSTITUTIONAL: No fever, weight loss, or fatigue  EYES: No eye pain, visual disturbances, or discharge  ENMT:  No difficulty hearing, tinnitus, vertigo; No sinus or throat pain  NECK: No pain or stiffness  BREASTS: No pain, masses, or nipple discharge  RESPIRATORY:  +shortness of breath  CARDIOVASCULAR: No chest pain, palpitations, dizziness, or leg swelling  GASTROINTESTINAL: No abdominal or epigastric pain. No nausea, vomiting, or hematemesis; No diarrhea or constipation. No melena or hematochezia.  GENITOURINARY: No dysuria, frequency, hematuria, or incontinence  NEUROLOGICAL: No headaches, memory loss, loss of strength, numbness, or tremors  SKIN: No itching, burning, rashes, or lesions   LYMPH NODES: No enlarged glands  ENDOCRINE: No heat or cold intolerance; No hair loss  MUSCULOSKELETAL: No joint pain or swelling; No muscle, back, or extremity pain  PSYCHIATRIC: No depression, anxiety, mood swings, or difficulty sleeping  HEME/LYMPH: No easy bruising, or bleeding gums  ALLERY AND IMMUNOLOGIC: No hives or eczema    MEDICATIONS  (STANDING):  amLODIPine   Tablet 5 milliGRAM(s) Oral daily  clopidogrel Tablet 75 milliGRAM(s) Oral daily  digoxin     Tablet 0.125 milliGRAM(s) Oral daily  enalapril 2.5 milliGRAM(s) Oral daily  escitalopram 15 milliGRAM(s) Oral daily  heparin  Injectable 5000 Unit(s) SubCutaneous two times a day  latanoprost 0.005% Ophthalmic Solution 1 Drop(s) Both EYES at bedtime  metoprolol tartrate 25 milliGRAM(s) Oral every 12 hours  pantoprazole    Tablet 40 milliGRAM(s) Oral before breakfast  timolol 0.5% Solution 1 Drop(s) Left EYE daily    MEDICATIONS  (PRN):  acetaminophen   Tablet .. 650 milliGRAM(s) Oral every 6 hours PRN Temp greater or equal to 38C (100.4F), Moderate Pain (4 - 6)  polyethylene glycol 3350 17 Gram(s) Oral daily PRN Constipation      PHYSICAL EXAM:  Vital Signs Last 24 Hrs  T(C): 36.7 (2020 16:38), Max: 36.9 (2020 00:58)  T(F): 98 (2020 16:38), Max: 98.5 (2020 00:58)  HR: 72 (2020 16:38) (60 - 88)  BP: 147/93 (2020 16:38) (147/84 - 171/100)  BP(mean): --  RR: 20 (2020 16:38) (18 - 26)  SpO2: 100% (2020 16:38) (98% - 100%)  I&O's Summary    GENERAL: NAD, well-developed  HEAD:  Atraumatic, Normocephalic  EYES: EOMI, PERRLA, conjunctiva and sclera clear  NECK: Supple, No JVD  CHEST/LUNG: Clear to auscultation bilaterally; No wheeze  HEART: Regular rate and rhythm; No murmurs, rubs, or gallops  ABDOMEN: Soft, Nontender, Nondistended; Bowel sounds present  EXTREMITIES:  2+ Peripheral Pulses, No clubbing, cyanosis, or edema  PSYCH: AAOx3  NEUROLOGY: non-focal  SKIN: No rashes or lesions    LABS:  CAPILLARY BLOOD GLUCOSE                              16.5   15.11 )-----------( 290      ( 2020 15:35 )             53.5     01-20    x   |  x   |  x   ----------------------------<  x   4.4   |  x   |  x     Ca    9.2      2020 15:35    TPro  8.0  /  Alb  3.7  /  TBili  0.7  /  DBili  x   /  AST  55<H>  /  ALT  15  /  AlkPhos  101  01-20    PT/INR - ( 2020 15:35 )   PT: 13.2 sec;   INR: 1.14 ratio         PTT - ( 2020 15:35 )  PTT:31.8 sec      Urinalysis Basic - ( 2020 17:24 )    Color: Yellow / Appearance: Turbid / S.021 / pH: x  Gluc: x / Ketone: Negative  / Bili: Negative / Urobili: Negative   Blood: x / Protein: 300 mg/dL / Nitrite: Negative   Leuk Esterase: Large / RBC: 18 /hpf / WBC >50   Sq Epi: x / Non Sq Epi: 3 / Bacteria: Many        RADIOLOGY & ADDITIONAL TESTS:    Imaging Personally Reviewed:    Consultant(s) Notes Reviewed:      Care Discussed with Consultants/Other Providers:
Patient is a 89y old  Female who presents with a chief complaint of     SUBJECTIVE / OVERNIGHT EVENTS:  No SOB, mild cough  Review of Systems:   CONSTITUTIONAL: No fever, weight loss, or fatigue  EYES: No eye pain, visual disturbances, or discharge  ENMT:  No difficulty hearing, tinnitus, vertigo; No sinus or throat pain  NECK: No pain or stiffness  BREASTS: No pain, masses, or nipple discharge  RESPIRATORY: No cough, wheezing, chills or hemoptysis; ++shortness of breath  CARDIOVASCULAR: No chest pain, palpitations, dizziness, or leg swelling  GASTROINTESTINAL: No abdominal or epigastric pain. No nausea, vomiting, or hematemesis; No diarrhea or constipation. No melena or hematochezia.  GENITOURINARY: No dysuria, frequency, hematuria, or incontinence  NEUROLOGICAL: No headaches, memory loss, loss of strength, numbness, or tremors  SKIN: No itching, burning, rashes, or lesions   LYMPH NODES: No enlarged glands  ENDOCRINE: No heat or cold intolerance; No hair loss  MUSCULOSKELETAL: No joint pain or swelling; No muscle, back, or extremity pain  PSYCHIATRIC: No depression, anxiety, mood swings, or difficulty sleeping  HEME/LYMPH: No easy bruising, or bleeding gums  ALLERY AND IMMUNOLOGIC: No hives or eczema    MEDICATIONS  (STANDING):  amLODIPine   Tablet 5 milliGRAM(s) Oral daily  clopidogrel Tablet 75 milliGRAM(s) Oral daily  digoxin     Tablet 0.125 milliGRAM(s) Oral daily  enalapril 2.5 milliGRAM(s) Oral daily  escitalopram 15 milliGRAM(s) Oral daily  heparin  Injectable 5000 Unit(s) SubCutaneous two times a day  latanoprost 0.005% Ophthalmic Solution 1 Drop(s) Both EYES at bedtime  metoprolol tartrate 25 milliGRAM(s) Oral every 12 hours  pantoprazole    Tablet 40 milliGRAM(s) Oral before breakfast  timolol 0.5% Solution 1 Drop(s) Left EYE daily    MEDICATIONS  (PRN):  acetaminophen   Tablet .. 650 milliGRAM(s) Oral every 6 hours PRN Temp greater or equal to 38C (100.4F), Moderate Pain (4 - 6)  polyethylene glycol 3350 17 Gram(s) Oral daily PRN Constipation      PHYSICAL EXAM:  Vital Signs Last 24 Hrs  T(C): 36.7 (2020 16:38), Max: 36.9 (2020 00:58)  T(F): 98 (2020 16:38), Max: 98.5 (2020 00:58)  HR: 72 (2020 16:38) (60 - 88)  BP: 147/93 (2020 16:38) (147/84 - 171/100)  BP(mean): --  RR: 20 (2020 16:38) (18 - 26)  SpO2: 100% (2020 16:38) (98% - 100%)  I&O's Summary    GENERAL: NAD, well-developed  HEAD:  Atraumatic, Normocephalic  EYES: EOMI, PERRLA, conjunctiva and sclera clear  NECK: Supple, No JVD  CHEST/LUNG: Clear to auscultation bilaterally; No wheeze  HEART: Regular rate and rhythm; No murmurs, rubs, or gallops  ABDOMEN: Soft, Nontender, Nondistended; Bowel sounds present  EXTREMITIES:  2+ Peripheral Pulses, No clubbing, cyanosis, or edema  PSYCH: AAOx3  NEUROLOGY: non-focal  SKIN: No rashes or lesions    LABS:  CAPILLARY BLOOD GLUCOSE                              16.5   15.11 )-----------( 290      ( 2020 15:35 )             53.5     01-20    x   |  x   |  x   ----------------------------<  x   4.4   |  x   |  x     Ca    9.2      2020 15:35    TPro  8.0  /  Alb  3.7  /  TBili  0.7  /  DBili  x   /  AST  55<H>  /  ALT  15  /  AlkPhos  101  01-20    PT/INR - ( 2020 15:35 )   PT: 13.2 sec;   INR: 1.14 ratio         PTT - ( 2020 15:35 )  PTT:31.8 sec      Urinalysis Basic - ( 2020 17:24 )    Color: Yellow / Appearance: Turbid / S.021 / pH: x  Gluc: x / Ketone: Negative  / Bili: Negative / Urobili: Negative   Blood: x / Protein: 300 mg/dL / Nitrite: Negative   Leuk Esterase: Large / RBC: 18 /hpf / WBC >50   Sq Epi: x / Non Sq Epi: 3 / Bacteria: Many        RADIOLOGY & ADDITIONAL TESTS:    Imaging Personally Reviewed:    Consultant(s) Notes Reviewed:      Care Discussed with Consultants/Other Providers:
Patient is a 89y old  Female who presents with a chief complaint of     SUBJECTIVE / OVERNIGHT EVENTS:  No SOB, no cough  O2 saturation dropped to 88% on RA  Review of Systems:   CONSTITUTIONAL: No fever, weight loss, or fatigue  EYES: No eye pain, visual disturbances, or discharge  ENMT:  No difficulty hearing, tinnitus, vertigo; No sinus or throat pain  NECK: No pain or stiffness  BREASTS: No pain, masses, or nipple discharge  RESPIRATORY: No cough, wheezing, chills or hemoptysis; +shortness of breath  CARDIOVASCULAR: No chest pain, palpitations, dizziness, or leg swelling  GASTROINTESTINAL: No abdominal or epigastric pain. No nausea, vomiting, or hematemesis; No diarrhea or constipation. No melena or hematochezia.  GENITOURINARY: No dysuria, frequency, hematuria, or incontinence  NEUROLOGICAL: No headaches, memory loss, loss of strength, numbness, or tremors  SKIN: No itching, burning, rashes, or lesions   LYMPH NODES: No enlarged glands  ENDOCRINE: No heat or cold intolerance; No hair loss  MUSCULOSKELETAL: No joint pain or swelling; No muscle, back, or extremity pain  PSYCHIATRIC: No depression, anxiety, mood swings, or difficulty sleeping  HEME/LYMPH: No easy bruising, or bleeding gums  ALLERY AND IMMUNOLOGIC: No hives or eczema    MEDICATIONS  (STANDING):  amLODIPine   Tablet 5 milliGRAM(s) Oral daily  clopidogrel Tablet 75 milliGRAM(s) Oral daily  digoxin     Tablet 0.125 milliGRAM(s) Oral daily  enalapril 2.5 milliGRAM(s) Oral daily  escitalopram 15 milliGRAM(s) Oral daily  heparin  Injectable 5000 Unit(s) SubCutaneous two times a day  latanoprost 0.005% Ophthalmic Solution 1 Drop(s) Both EYES at bedtime  metoprolol tartrate 25 milliGRAM(s) Oral every 12 hours  pantoprazole    Tablet 40 milliGRAM(s) Oral before breakfast  timolol 0.5% Solution 1 Drop(s) Left EYE daily    MEDICATIONS  (PRN):  acetaminophen   Tablet .. 650 milliGRAM(s) Oral every 6 hours PRN Temp greater or equal to 38C (100.4F), Moderate Pain (4 - 6)  polyethylene glycol 3350 17 Gram(s) Oral daily PRN Constipation      PHYSICAL EXAM:  Vital Signs Last 24 Hrs  T(C): 36.7 (2020 16:38), Max: 36.9 (2020 00:58)  T(F): 98 (2020 16:38), Max: 98.5 (2020 00:58)  HR: 72 (2020 16:38) (60 - 88)  BP: 147/93 (2020 16:38) (147/84 - 171/100)  BP(mean): --  RR: 20 (2020 16:38) (18 - 26)  SpO2: 100% (2020 16:38) (98% - 100%)  I&O's Summary    GENERAL: NAD, well-developed  HEAD:  Atraumatic, Normocephalic  EYES: EOMI, PERRLA, conjunctiva and sclera clear  NECK: Supple, No JVD  CHEST/LUNG: Clear to auscultation bilaterally; No wheeze  HEART: Regular rate and rhythm; No murmurs, rubs, or gallops  ABDOMEN: Soft, Nontender, Nondistended; Bowel sounds present  EXTREMITIES:  2+ Peripheral Pulses, No clubbing, cyanosis, or edema  PSYCH: AAOx3  NEUROLOGY: non-focal  SKIN: No rashes or lesions    LABS:  CAPILLARY BLOOD GLUCOSE                              16.5   15.11 )-----------( 290      ( 2020 15:35 )             53.5     01-20    x   |  x   |  x   ----------------------------<  x   4.4   |  x   |  x     Ca    9.2      2020 15:35    TPro  8.0  /  Alb  3.7  /  TBili  0.7  /  DBili  x   /  AST  55<H>  /  ALT  15  /  AlkPhos  101  01-20    PT/INR - ( 2020 15:35 )   PT: 13.2 sec;   INR: 1.14 ratio         PTT - ( 2020 15:35 )  PTT:31.8 sec      Urinalysis Basic - ( 2020 17:24 )    Color: Yellow / Appearance: Turbid / S.021 / pH: x  Gluc: x / Ketone: Negative  / Bili: Negative / Urobili: Negative   Blood: x / Protein: 300 mg/dL / Nitrite: Negative   Leuk Esterase: Large / RBC: 18 /hpf / WBC >50   Sq Epi: x / Non Sq Epi: 3 / Bacteria: Many        RADIOLOGY & ADDITIONAL TESTS:    Imaging Personally Reviewed:    Consultant(s) Notes Reviewed:      Care Discussed with Consultants/Other Providers:
Patient is a 89y old  Female who presents with a chief complaint of     SUBJECTIVE / OVERNIGHT EVENTS:  No SOB, no cough  O2 saturation dropped to 88% on RA  Review of Systems:   CONSTITUTIONAL: No fever, weight loss, or fatigue  EYES: No eye pain, visual disturbances, or discharge  ENMT:  No difficulty hearing, tinnitus, vertigo; No sinus or throat pain  NECK: No pain or stiffness  BREASTS: No pain, masses, or nipple discharge  RESPIRATORY: No cough, wheezing, chills or hemoptysis; +shortness of breath  CARDIOVASCULAR: No chest pain, palpitations, dizziness, or leg swelling  GASTROINTESTINAL: No abdominal or epigastric pain. No nausea, vomiting, or hematemesis; No diarrhea or constipation. No melena or hematochezia.  GENITOURINARY: No dysuria, frequency, hematuria, or incontinence  NEUROLOGICAL: No headaches, memory loss, loss of strength, numbness, or tremors  SKIN: No itching, burning, rashes, or lesions   LYMPH NODES: No enlarged glands  ENDOCRINE: No heat or cold intolerance; No hair loss  MUSCULOSKELETAL: No joint pain or swelling; No muscle, back, or extremity pain  PSYCHIATRIC: No depression, anxiety, mood swings, or difficulty sleeping  HEME/LYMPH: No easy bruising, or bleeding gums  ALLERY AND IMMUNOLOGIC: No hives or eczema    MEDICATIONS  (STANDING):  amLODIPine   Tablet 5 milliGRAM(s) Oral daily  clopidogrel Tablet 75 milliGRAM(s) Oral daily  digoxin     Tablet 0.125 milliGRAM(s) Oral daily  enalapril 2.5 milliGRAM(s) Oral daily  escitalopram 15 milliGRAM(s) Oral daily  heparin  Injectable 5000 Unit(s) SubCutaneous two times a day  latanoprost 0.005% Ophthalmic Solution 1 Drop(s) Both EYES at bedtime  metoprolol tartrate 25 milliGRAM(s) Oral every 12 hours  pantoprazole    Tablet 40 milliGRAM(s) Oral before breakfast  timolol 0.5% Solution 1 Drop(s) Left EYE daily    MEDICATIONS  (PRN):  acetaminophen   Tablet .. 650 milliGRAM(s) Oral every 6 hours PRN Temp greater or equal to 38C (100.4F), Moderate Pain (4 - 6)  polyethylene glycol 3350 17 Gram(s) Oral daily PRN Constipation      PHYSICAL EXAM:  Vital Signs Last 24 Hrs  T(C): 36.7 (2020 16:38), Max: 36.9 (2020 00:58)  T(F): 98 (2020 16:38), Max: 98.5 (2020 00:58)  HR: 72 (2020 16:38) (60 - 88)  BP: 147/93 (2020 16:38) (147/84 - 171/100)  BP(mean): --  RR: 20 (2020 16:38) (18 - 26)  SpO2: 100% (2020 16:38) (98% - 100%)  I&O's Summary    GENERAL: NAD, well-developed  HEAD:  Atraumatic, Normocephalic  EYES: EOMI, PERRLA, conjunctiva and sclera clear  NECK: Supple, No JVD  CHEST/LUNG: Clear to auscultation bilaterally; No wheeze  HEART: Regular rate and rhythm; No murmurs, rubs, or gallops  ABDOMEN: Soft, Nontender, Nondistended; Bowel sounds present  EXTREMITIES:  2+ Peripheral Pulses, No clubbing, cyanosis, or edema  PSYCH: AAOx3  NEUROLOGY: non-focal  SKIN: No rashes or lesions    LABS:  CAPILLARY BLOOD GLUCOSE                              16.5   15.11 )-----------( 290      ( 2020 15:35 )             53.5     01-20    x   |  x   |  x   ----------------------------<  x   4.4   |  x   |  x     Ca    9.2      2020 15:35    TPro  8.0  /  Alb  3.7  /  TBili  0.7  /  DBili  x   /  AST  55<H>  /  ALT  15  /  AlkPhos  101  01-20    PT/INR - ( 2020 15:35 )   PT: 13.2 sec;   INR: 1.14 ratio         PTT - ( 2020 15:35 )  PTT:31.8 sec      Urinalysis Basic - ( 2020 17:24 )    Color: Yellow / Appearance: Turbid / S.021 / pH: x  Gluc: x / Ketone: Negative  / Bili: Negative / Urobili: Negative   Blood: x / Protein: 300 mg/dL / Nitrite: Negative   Leuk Esterase: Large / RBC: 18 /hpf / WBC >50   Sq Epi: x / Non Sq Epi: 3 / Bacteria: Many        RADIOLOGY & ADDITIONAL TESTS:    Imaging Personally Reviewed:    Consultant(s) Notes Reviewed:      Care Discussed with Consultants/Other Providers:
Patient is a 89y old  Female who presents with a chief complaint of     SUBJECTIVE / OVERNIGHT EVENTS:  Remains on BiPAP  Non verbal  Review of Systems:   CONSTITUTIONAL: No fever, weight loss, or fatigue  EYES: No eye pain, visual disturbances, or discharge  ENMT:  No difficulty hearing, tinnitus, vertigo; No sinus or throat pain  NECK: No pain or stiffness  BREASTS: No pain, masses, or nipple discharge  RESPIRATORY: No cough, wheezing, chills or hemoptysis; +++shortness of breath  CARDIOVASCULAR: No chest pain, palpitations, dizziness, or leg swelling  GASTROINTESTINAL: No abdominal or epigastric pain. No nausea, vomiting, or hematemesis; No diarrhea or constipation. No melena or hematochezia.  GENITOURINARY: No dysuria, frequency, hematuria, or incontinence  NEUROLOGICAL: No headaches, memory loss, loss of strength, numbness, or tremors  SKIN: No itching, burning, rashes, or lesions   LYMPH NODES: No enlarged glands  ENDOCRINE: No heat or cold intolerance; No hair loss  MUSCULOSKELETAL: No joint pain or swelling; No muscle, back, or extremity pain  PSYCHIATRIC: No depression, anxiety, mood swings, or difficulty sleeping  HEME/LYMPH: No easy bruising, or bleeding gums  ALLERY AND IMMUNOLOGIC: No hives or eczema    MEDICATIONS  (STANDING):  amLODIPine   Tablet 5 milliGRAM(s) Oral daily  clopidogrel Tablet 75 milliGRAM(s) Oral daily  digoxin     Tablet 0.125 milliGRAM(s) Oral daily  enalapril 2.5 milliGRAM(s) Oral daily  escitalopram 15 milliGRAM(s) Oral daily  heparin  Injectable 5000 Unit(s) SubCutaneous two times a day  latanoprost 0.005% Ophthalmic Solution 1 Drop(s) Both EYES at bedtime  metoprolol tartrate 25 milliGRAM(s) Oral every 12 hours  pantoprazole    Tablet 40 milliGRAM(s) Oral before breakfast  timolol 0.5% Solution 1 Drop(s) Left EYE daily    MEDICATIONS  (PRN):  acetaminophen   Tablet .. 650 milliGRAM(s) Oral every 6 hours PRN Temp greater or equal to 38C (100.4F), Moderate Pain (4 - 6)  polyethylene glycol 3350 17 Gram(s) Oral daily PRN Constipation      PHYSICAL EXAM:  Vital Signs Last 24 Hrs  T(C): 36.7 (2020 16:38), Max: 36.9 (2020 00:58)  T(F): 98 (2020 16:38), Max: 98.5 (2020 00:58)  HR: 72 (2020 16:38) (60 - 88)  BP: 147/93 (2020 16:38) (147/84 - 171/100)  BP(mean): --  RR: 20 (2020 16:38) (18 - 26)  SpO2: 100% (2020 16:38) (98% - 100%)  I&O's Summary    GENERAL: NAD, well-developed  HEAD:  Atraumatic, Normocephalic  EYES: EOMI, PERRLA, conjunctiva and sclera clear  NECK: Supple, No JVD  CHEST/LUNG: Clear to auscultation bilaterally; No wheeze  HEART: Regular rate and rhythm; No murmurs, rubs, or gallops  ABDOMEN: Soft, Nontender, Nondistended; Bowel sounds present  EXTREMITIES:  2+ Peripheral Pulses, No clubbing, cyanosis, or edema  PSYCH: AAOx3  NEUROLOGY: non-focal  SKIN: No rashes or lesions    LABS:  CAPILLARY BLOOD GLUCOSE                              16.5   15.11 )-----------( 290      ( 2020 15:35 )             53.5     01-20    x   |  x   |  x   ----------------------------<  x   4.4   |  x   |  x     Ca    9.2      2020 15:35    TPro  8.0  /  Alb  3.7  /  TBili  0.7  /  DBili  x   /  AST  55<H>  /  ALT  15  /  AlkPhos  101  01-20    PT/INR - ( 2020 15:35 )   PT: 13.2 sec;   INR: 1.14 ratio         PTT - ( 2020 15:35 )  PTT:31.8 sec      Urinalysis Basic - ( 2020 17:24 )    Color: Yellow / Appearance: Turbid / S.021 / pH: x  Gluc: x / Ketone: Negative  / Bili: Negative / Urobili: Negative   Blood: x / Protein: 300 mg/dL / Nitrite: Negative   Leuk Esterase: Large / RBC: 18 /hpf / WBC >50   Sq Epi: x / Non Sq Epi: 3 / Bacteria: Many        RADIOLOGY & ADDITIONAL TESTS:    Imaging Personally Reviewed:    Consultant(s) Notes Reviewed:      Care Discussed with Consultants/Other Providers:
Patient is a 89y old  Female who presents with a chief complaint of     SUBJECTIVE / OVERNIGHT EVENTS:  Remains on BiPAP  Non verbal  Review of Systems:   CONSTITUTIONAL: No fever, weight loss, or fatigue  EYES: No eye pain, visual disturbances, or discharge  ENMT:  No difficulty hearing, tinnitus, vertigo; No sinus or throat pain  NECK: No pain or stiffness  BREASTS: No pain, masses, or nipple discharge  RESPIRATORY: No cough, wheezing, chills or hemoptysis; ++shortness of breath  CARDIOVASCULAR: No chest pain, palpitations, dizziness, or leg swelling  GASTROINTESTINAL: No abdominal or epigastric pain. No nausea, vomiting, or hematemesis; No diarrhea or constipation. No melena or hematochezia.  GENITOURINARY: No dysuria, frequency, hematuria, or incontinence  NEUROLOGICAL: No headaches, memory loss, loss of strength, numbness, or tremors  SKIN: No itching, burning, rashes, or lesions   LYMPH NODES: No enlarged glands  ENDOCRINE: No heat or cold intolerance; No hair loss  MUSCULOSKELETAL: No joint pain or swelling; No muscle, back, or extremity pain  PSYCHIATRIC: No depression, anxiety, mood swings, or difficulty sleeping  HEME/LYMPH: No easy bruising, or bleeding gums  ALLERY AND IMMUNOLOGIC: No hives or eczema    MEDICATIONS  (STANDING):  amLODIPine   Tablet 5 milliGRAM(s) Oral daily  clopidogrel Tablet 75 milliGRAM(s) Oral daily  digoxin     Tablet 0.125 milliGRAM(s) Oral daily  enalapril 2.5 milliGRAM(s) Oral daily  escitalopram 15 milliGRAM(s) Oral daily  heparin  Injectable 5000 Unit(s) SubCutaneous two times a day  latanoprost 0.005% Ophthalmic Solution 1 Drop(s) Both EYES at bedtime  metoprolol tartrate 25 milliGRAM(s) Oral every 12 hours  pantoprazole    Tablet 40 milliGRAM(s) Oral before breakfast  timolol 0.5% Solution 1 Drop(s) Left EYE daily    MEDICATIONS  (PRN):  acetaminophen   Tablet .. 650 milliGRAM(s) Oral every 6 hours PRN Temp greater or equal to 38C (100.4F), Moderate Pain (4 - 6)  polyethylene glycol 3350 17 Gram(s) Oral daily PRN Constipation      PHYSICAL EXAM:  Vital Signs Last 24 Hrs  T(C): 36.7 (2020 16:38), Max: 36.9 (2020 00:58)  T(F): 98 (2020 16:38), Max: 98.5 (2020 00:58)  HR: 72 (2020 16:38) (60 - 88)  BP: 147/93 (2020 16:38) (147/84 - 171/100)  BP(mean): --  RR: 20 (2020 16:38) (18 - 26)  SpO2: 100% (2020 16:38) (98% - 100%)  I&O's Summary    GENERAL: NAD, well-developed  HEAD:  Atraumatic, Normocephalic  EYES: EOMI, PERRLA, conjunctiva and sclera clear  NECK: Supple, No JVD  CHEST/LUNG: Clear to auscultation bilaterally; No wheeze  HEART: Regular rate and rhythm; No murmurs, rubs, or gallops  ABDOMEN: Soft, Nontender, Nondistended; Bowel sounds present  EXTREMITIES:  2+ Peripheral Pulses, No clubbing, cyanosis, or edema  PSYCH: AAOx3  NEUROLOGY: non-focal  SKIN: No rashes or lesions    LABS:  CAPILLARY BLOOD GLUCOSE                              16.5   15.11 )-----------( 290      ( 2020 15:35 )             53.5     01-20    x   |  x   |  x   ----------------------------<  x   4.4   |  x   |  x     Ca    9.2      2020 15:35    TPro  8.0  /  Alb  3.7  /  TBili  0.7  /  DBili  x   /  AST  55<H>  /  ALT  15  /  AlkPhos  101  01-20    PT/INR - ( 2020 15:35 )   PT: 13.2 sec;   INR: 1.14 ratio         PTT - ( 2020 15:35 )  PTT:31.8 sec      Urinalysis Basic - ( 2020 17:24 )    Color: Yellow / Appearance: Turbid / S.021 / pH: x  Gluc: x / Ketone: Negative  / Bili: Negative / Urobili: Negative   Blood: x / Protein: 300 mg/dL / Nitrite: Negative   Leuk Esterase: Large / RBC: 18 /hpf / WBC >50   Sq Epi: x / Non Sq Epi: 3 / Bacteria: Many        RADIOLOGY & ADDITIONAL TESTS:    Imaging Personally Reviewed:    Consultant(s) Notes Reviewed:      Care Discussed with Consultants/Other Providers:
Patient is a 89y old  Female who presents with a chief complaint of     SUBJECTIVE / OVERNIGHT EVENTS:  Resp distress improved    Review of Systems:   CONSTITUTIONAL: No fever, weight loss, or fatigue  EYES: No eye pain, visual disturbances, or discharge  ENMT:  No difficulty hearing, tinnitus, vertigo; No sinus or throat pain  NECK: No pain or stiffness  BREASTS: No pain, masses, or nipple discharge  RESPIRATORY: No cough, wheezing, chills or hemoptysis; ++shortness of breath  CARDIOVASCULAR: No chest pain, palpitations, dizziness, or leg swelling  GASTROINTESTINAL: No abdominal or epigastric pain. No nausea, vomiting, or hematemesis; No diarrhea or constipation. No melena or hematochezia.  GENITOURINARY: No dysuria, frequency, hematuria, or incontinence  NEUROLOGICAL: No headaches, memory loss, loss of strength, numbness, or tremors  SKIN: No itching, burning, rashes, or lesions   LYMPH NODES: No enlarged glands  ENDOCRINE: No heat or cold intolerance; No hair loss  MUSCULOSKELETAL: No joint pain or swelling; No muscle, back, or extremity pain  PSYCHIATRIC: No depression, anxiety, mood swings, or difficulty sleeping  HEME/LYMPH: No easy bruising, or bleeding gums  ALLERY AND IMMUNOLOGIC: No hives or eczema    MEDICATIONS  (STANDING):  amLODIPine   Tablet 5 milliGRAM(s) Oral daily  clopidogrel Tablet 75 milliGRAM(s) Oral daily  digoxin     Tablet 0.125 milliGRAM(s) Oral daily  enalapril 2.5 milliGRAM(s) Oral daily  escitalopram 15 milliGRAM(s) Oral daily  heparin  Injectable 5000 Unit(s) SubCutaneous two times a day  latanoprost 0.005% Ophthalmic Solution 1 Drop(s) Both EYES at bedtime  metoprolol tartrate 25 milliGRAM(s) Oral every 12 hours  pantoprazole    Tablet 40 milliGRAM(s) Oral before breakfast  timolol 0.5% Solution 1 Drop(s) Left EYE daily    MEDICATIONS  (PRN):  acetaminophen   Tablet .. 650 milliGRAM(s) Oral every 6 hours PRN Temp greater or equal to 38C (100.4F), Moderate Pain (4 - 6)  polyethylene glycol 3350 17 Gram(s) Oral daily PRN Constipation      PHYSICAL EXAM:  Vital Signs Last 24 Hrs  T(C): 36.7 (2020 16:38), Max: 36.9 (2020 00:58)  T(F): 98 (2020 16:38), Max: 98.5 (2020 00:58)  HR: 72 (2020 16:38) (60 - 88)  BP: 147/93 (2020 16:38) (147/84 - 171/100)  BP(mean): --  RR: 20 (2020 16:38) (18 - 26)  SpO2: 100% (2020 16:38) (98% - 100%)  I&O's Summary    GENERAL: NAD, well-developed  HEAD:  Atraumatic, Normocephalic  EYES: EOMI, PERRLA, conjunctiva and sclera clear  NECK: Supple, No JVD  CHEST/LUNG: Clear to auscultation bilaterally; No wheeze  HEART: Regular rate and rhythm; No murmurs, rubs, or gallops  ABDOMEN: Soft, Nontender, Nondistended; Bowel sounds present  EXTREMITIES:  2+ Peripheral Pulses, No clubbing, cyanosis, or edema  PSYCH: AAOx3  NEUROLOGY: non-focal  SKIN: No rashes or lesions    LABS:  CAPILLARY BLOOD GLUCOSE                              16.5   15.11 )-----------( 290      ( 2020 15:35 )             53.5     01-20    x   |  x   |  x   ----------------------------<  x   4.4   |  x   |  x     Ca    9.2      2020 15:35    TPro  8.0  /  Alb  3.7  /  TBili  0.7  /  DBili  x   /  AST  55<H>  /  ALT  15  /  AlkPhos  101  01-20    PT/INR - ( 2020 15:35 )   PT: 13.2 sec;   INR: 1.14 ratio         PTT - ( 2020 15:35 )  PTT:31.8 sec      Urinalysis Basic - ( 2020 17:24 )    Color: Yellow / Appearance: Turbid / S.021 / pH: x  Gluc: x / Ketone: Negative  / Bili: Negative / Urobili: Negative   Blood: x / Protein: 300 mg/dL / Nitrite: Negative   Leuk Esterase: Large / RBC: 18 /hpf / WBC >50   Sq Epi: x / Non Sq Epi: 3 / Bacteria: Many        RADIOLOGY & ADDITIONAL TESTS:    Imaging Personally Reviewed:    Consultant(s) Notes Reviewed:      Care Discussed with Consultants/Other Providers:
Patient is a 89y old  Female who presents with a chief complaint of     SUBJECTIVE / OVERNIGHT EVENTS:  Resp distress improved  Able to speak full sentences  Nooted to cough after drinking liquids  Review of Systems:   CONSTITUTIONAL: No fever, weight loss, or fatigue  EYES: No eye pain, visual disturbances, or discharge  ENMT:  No difficulty hearing, tinnitus, vertigo; No sinus or throat pain  NECK: No pain or stiffness  BREASTS: No pain, masses, or nipple discharge  RESPIRATORY: No cough, wheezing, chills or hemoptysis; ++shortness of breath  CARDIOVASCULAR: No chest pain, palpitations, dizziness, or leg swelling  GASTROINTESTINAL: No abdominal or epigastric pain. No nausea, vomiting, or hematemesis; No diarrhea or constipation. No melena or hematochezia.  GENITOURINARY: No dysuria, frequency, hematuria, or incontinence  NEUROLOGICAL: No headaches, memory loss, loss of strength, numbness, or tremors  SKIN: No itching, burning, rashes, or lesions   LYMPH NODES: No enlarged glands  ENDOCRINE: No heat or cold intolerance; No hair loss  MUSCULOSKELETAL: No joint pain or swelling; No muscle, back, or extremity pain  PSYCHIATRIC: No depression, anxiety, mood swings, or difficulty sleeping  HEME/LYMPH: No easy bruising, or bleeding gums  ALLERY AND IMMUNOLOGIC: No hives or eczema    MEDICATIONS  (STANDING):  amLODIPine   Tablet 5 milliGRAM(s) Oral daily  clopidogrel Tablet 75 milliGRAM(s) Oral daily  digoxin     Tablet 0.125 milliGRAM(s) Oral daily  enalapril 2.5 milliGRAM(s) Oral daily  escitalopram 15 milliGRAM(s) Oral daily  heparin  Injectable 5000 Unit(s) SubCutaneous two times a day  latanoprost 0.005% Ophthalmic Solution 1 Drop(s) Both EYES at bedtime  metoprolol tartrate 25 milliGRAM(s) Oral every 12 hours  pantoprazole    Tablet 40 milliGRAM(s) Oral before breakfast  timolol 0.5% Solution 1 Drop(s) Left EYE daily    MEDICATIONS  (PRN):  acetaminophen   Tablet .. 650 milliGRAM(s) Oral every 6 hours PRN Temp greater or equal to 38C (100.4F), Moderate Pain (4 - 6)  polyethylene glycol 3350 17 Gram(s) Oral daily PRN Constipation      PHYSICAL EXAM:  Vital Signs Last 24 Hrs  T(C): 36.7 (2020 16:38), Max: 36.9 (2020 00:58)  T(F): 98 (2020 16:38), Max: 98.5 (2020 00:58)  HR: 72 (2020 16:38) (60 - 88)  BP: 147/93 (2020 16:38) (147/84 - 171/100)  BP(mean): --  RR: 20 (2020 16:38) (18 - 26)  SpO2: 100% (2020 16:38) (98% - 100%)  I&O's Summary    GENERAL: NAD, well-developed  HEAD:  Atraumatic, Normocephalic  EYES: EOMI, PERRLA, conjunctiva and sclera clear  NECK: Supple, No JVD  CHEST/LUNG: Clear to auscultation bilaterally; No wheeze  HEART: Regular rate and rhythm; No murmurs, rubs, or gallops  ABDOMEN: Soft, Nontender, Nondistended; Bowel sounds present  EXTREMITIES:  2+ Peripheral Pulses, No clubbing, cyanosis, or edema  PSYCH: AAOx3  NEUROLOGY: non-focal  SKIN: No rashes or lesions    LABS:  CAPILLARY BLOOD GLUCOSE                              16.5   15.11 )-----------( 290      ( 2020 15:35 )             53.5     01-20    x   |  x   |  x   ----------------------------<  x   4.4   |  x   |  x     Ca    9.2      2020 15:35    TPro  8.0  /  Alb  3.7  /  TBili  0.7  /  DBili  x   /  AST  55<H>  /  ALT  15  /  AlkPhos  101  01-20    PT/INR - ( 2020 15:35 )   PT: 13.2 sec;   INR: 1.14 ratio         PTT - ( 2020 15:35 )  PTT:31.8 sec      Urinalysis Basic - ( 2020 17:24 )    Color: Yellow / Appearance: Turbid / S.021 / pH: x  Gluc: x / Ketone: Negative  / Bili: Negative / Urobili: Negative   Blood: x / Protein: 300 mg/dL / Nitrite: Negative   Leuk Esterase: Large / RBC: 18 /hpf / WBC >50   Sq Epi: x / Non Sq Epi: 3 / Bacteria: Many        RADIOLOGY & ADDITIONAL TESTS:    Imaging Personally Reviewed:    Consultant(s) Notes Reviewed:      Care Discussed with Consultants/Other Providers:
Patient is a 89y old  Female who presents with a chief complaint of     SUBJECTIVE / OVERNIGHT EVENTS:  Resp distress improved  Non verbal  Confused. Congestion noted  Review of Systems:   CONSTITUTIONAL: No fever, weight loss, or fatigue  EYES: No eye pain, visual disturbances, or discharge  ENMT:  No difficulty hearing, tinnitus, vertigo; No sinus or throat pain  NECK: No pain or stiffness  BREASTS: No pain, masses, or nipple discharge  RESPIRATORY: No cough, wheezing, chills or hemoptysis; ++shortness of breath  CARDIOVASCULAR: No chest pain, palpitations, dizziness, or leg swelling  GASTROINTESTINAL: No abdominal or epigastric pain. No nausea, vomiting, or hematemesis; No diarrhea or constipation. No melena or hematochezia.  GENITOURINARY: No dysuria, frequency, hematuria, or incontinence  NEUROLOGICAL: No headaches, memory loss, loss of strength, numbness, or tremors  SKIN: No itching, burning, rashes, or lesions   LYMPH NODES: No enlarged glands  ENDOCRINE: No heat or cold intolerance; No hair loss  MUSCULOSKELETAL: No joint pain or swelling; No muscle, back, or extremity pain  PSYCHIATRIC: No depression, anxiety, mood swings, or difficulty sleeping  HEME/LYMPH: No easy bruising, or bleeding gums  ALLERY AND IMMUNOLOGIC: No hives or eczema    MEDICATIONS  (STANDING):  amLODIPine   Tablet 5 milliGRAM(s) Oral daily  clopidogrel Tablet 75 milliGRAM(s) Oral daily  digoxin     Tablet 0.125 milliGRAM(s) Oral daily  enalapril 2.5 milliGRAM(s) Oral daily  escitalopram 15 milliGRAM(s) Oral daily  heparin  Injectable 5000 Unit(s) SubCutaneous two times a day  latanoprost 0.005% Ophthalmic Solution 1 Drop(s) Both EYES at bedtime  metoprolol tartrate 25 milliGRAM(s) Oral every 12 hours  pantoprazole    Tablet 40 milliGRAM(s) Oral before breakfast  timolol 0.5% Solution 1 Drop(s) Left EYE daily    MEDICATIONS  (PRN):  acetaminophen   Tablet .. 650 milliGRAM(s) Oral every 6 hours PRN Temp greater or equal to 38C (100.4F), Moderate Pain (4 - 6)  polyethylene glycol 3350 17 Gram(s) Oral daily PRN Constipation      PHYSICAL EXAM:  Vital Signs Last 24 Hrs  T(C): 36.7 (2020 16:38), Max: 36.9 (2020 00:58)  T(F): 98 (2020 16:38), Max: 98.5 (2020 00:58)  HR: 72 (2020 16:38) (60 - 88)  BP: 147/93 (2020 16:38) (147/84 - 171/100)  BP(mean): --  RR: 20 (2020 16:38) (18 - 26)  SpO2: 100% (2020 16:38) (98% - 100%)  I&O's Summary    GENERAL: NAD, well-developed  HEAD:  Atraumatic, Normocephalic  EYES: EOMI, PERRLA, conjunctiva and sclera clear  NECK: Supple, No JVD  CHEST/LUNG: Clear to auscultation bilaterally; No wheeze  HEART: Regular rate and rhythm; No murmurs, rubs, or gallops  ABDOMEN: Soft, Nontender, Nondistended; Bowel sounds present  EXTREMITIES:  2+ Peripheral Pulses, No clubbing, cyanosis, or edema  PSYCH: AAOx3  NEUROLOGY: non-focal  SKIN: No rashes or lesions    LABS:  CAPILLARY BLOOD GLUCOSE                              16.5   15.11 )-----------( 290      ( 2020 15:35 )             53.5     01-20    x   |  x   |  x   ----------------------------<  x   4.4   |  x   |  x     Ca    9.2      2020 15:35    TPro  8.0  /  Alb  3.7  /  TBili  0.7  /  DBili  x   /  AST  55<H>  /  ALT  15  /  AlkPhos  101  01-20    PT/INR - ( 2020 15:35 )   PT: 13.2 sec;   INR: 1.14 ratio         PTT - ( 2020 15:35 )  PTT:31.8 sec      Urinalysis Basic - ( 2020 17:24 )    Color: Yellow / Appearance: Turbid / S.021 / pH: x  Gluc: x / Ketone: Negative  / Bili: Negative / Urobili: Negative   Blood: x / Protein: 300 mg/dL / Nitrite: Negative   Leuk Esterase: Large / RBC: 18 /hpf / WBC >50   Sq Epi: x / Non Sq Epi: 3 / Bacteria: Many        RADIOLOGY & ADDITIONAL TESTS:    Imaging Personally Reviewed:    Consultant(s) Notes Reviewed:      Care Discussed with Consultants/Other Providers:
Patient is a 89y old  Female who presents with a chief complaint of     SUBJECTIVE / OVERNIGHT EVENTS:  Resp distress improved  Non verbal  Review of Systems:   CONSTITUTIONAL: No fever, weight loss, or fatigue  EYES: No eye pain, visual disturbances, or discharge  ENMT:  No difficulty hearing, tinnitus, vertigo; No sinus or throat pain  NECK: No pain or stiffness  BREASTS: No pain, masses, or nipple discharge  RESPIRATORY: No cough, wheezing, chills or hemoptysis; ++shortness of breath  CARDIOVASCULAR: No chest pain, palpitations, dizziness, or leg swelling  GASTROINTESTINAL: No abdominal or epigastric pain. No nausea, vomiting, or hematemesis; No diarrhea or constipation. No melena or hematochezia.  GENITOURINARY: No dysuria, frequency, hematuria, or incontinence  NEUROLOGICAL: No headaches, memory loss, loss of strength, numbness, or tremors  SKIN: No itching, burning, rashes, or lesions   LYMPH NODES: No enlarged glands  ENDOCRINE: No heat or cold intolerance; No hair loss  MUSCULOSKELETAL: No joint pain or swelling; No muscle, back, or extremity pain  PSYCHIATRIC: No depression, anxiety, mood swings, or difficulty sleeping  HEME/LYMPH: No easy bruising, or bleeding gums  ALLERY AND IMMUNOLOGIC: No hives or eczema    MEDICATIONS  (STANDING):  amLODIPine   Tablet 5 milliGRAM(s) Oral daily  clopidogrel Tablet 75 milliGRAM(s) Oral daily  digoxin     Tablet 0.125 milliGRAM(s) Oral daily  enalapril 2.5 milliGRAM(s) Oral daily  escitalopram 15 milliGRAM(s) Oral daily  heparin  Injectable 5000 Unit(s) SubCutaneous two times a day  latanoprost 0.005% Ophthalmic Solution 1 Drop(s) Both EYES at bedtime  metoprolol tartrate 25 milliGRAM(s) Oral every 12 hours  pantoprazole    Tablet 40 milliGRAM(s) Oral before breakfast  timolol 0.5% Solution 1 Drop(s) Left EYE daily    MEDICATIONS  (PRN):  acetaminophen   Tablet .. 650 milliGRAM(s) Oral every 6 hours PRN Temp greater or equal to 38C (100.4F), Moderate Pain (4 - 6)  polyethylene glycol 3350 17 Gram(s) Oral daily PRN Constipation      PHYSICAL EXAM:  Vital Signs Last 24 Hrs  T(C): 36.7 (2020 16:38), Max: 36.9 (2020 00:58)  T(F): 98 (2020 16:38), Max: 98.5 (2020 00:58)  HR: 72 (2020 16:38) (60 - 88)  BP: 147/93 (2020 16:38) (147/84 - 171/100)  BP(mean): --  RR: 20 (2020 16:38) (18 - 26)  SpO2: 100% (2020 16:38) (98% - 100%)  I&O's Summary    GENERAL: NAD, well-developed  HEAD:  Atraumatic, Normocephalic  EYES: EOMI, PERRLA, conjunctiva and sclera clear  NECK: Supple, No JVD  CHEST/LUNG: Clear to auscultation bilaterally; No wheeze  HEART: Regular rate and rhythm; No murmurs, rubs, or gallops  ABDOMEN: Soft, Nontender, Nondistended; Bowel sounds present  EXTREMITIES:  2+ Peripheral Pulses, No clubbing, cyanosis, or edema  PSYCH: AAOx3  NEUROLOGY: non-focal  SKIN: No rashes or lesions    LABS:  CAPILLARY BLOOD GLUCOSE                              16.5   15.11 )-----------( 290      ( 2020 15:35 )             53.5     01-20    x   |  x   |  x   ----------------------------<  x   4.4   |  x   |  x     Ca    9.2      2020 15:35    TPro  8.0  /  Alb  3.7  /  TBili  0.7  /  DBili  x   /  AST  55<H>  /  ALT  15  /  AlkPhos  101  01-20    PT/INR - ( 2020 15:35 )   PT: 13.2 sec;   INR: 1.14 ratio         PTT - ( 2020 15:35 )  PTT:31.8 sec      Urinalysis Basic - ( 2020 17:24 )    Color: Yellow / Appearance: Turbid / S.021 / pH: x  Gluc: x / Ketone: Negative  / Bili: Negative / Urobili: Negative   Blood: x / Protein: 300 mg/dL / Nitrite: Negative   Leuk Esterase: Large / RBC: 18 /hpf / WBC >50   Sq Epi: x / Non Sq Epi: 3 / Bacteria: Many        RADIOLOGY & ADDITIONAL TESTS:    Imaging Personally Reviewed:    Consultant(s) Notes Reviewed:      Care Discussed with Consultants/Other Providers:
Patient is a 89y old  Female who presents with a chief complaint of     SUBJECTIVE / OVERNIGHT EVENTS:No SOB, mild cough  Review of Systems:   CONSTITUTIONAL: No fever, weight loss, or fatigue  EYES: No eye pain, visual disturbances, or discharge  ENMT:  No difficulty hearing, tinnitus, vertigo; No sinus or throat pain  NECK: No pain or stiffness  BREASTS: No pain, masses, or nipple discharge  RESPIRATORY: No cough, wheezing, chills or hemoptysis; ++shortness of breath  CARDIOVASCULAR: No chest pain, palpitations, dizziness, or leg swelling  GASTROINTESTINAL: No abdominal or epigastric pain. No nausea, vomiting, or hematemesis; No diarrhea or constipation. No melena or hematochezia.  GENITOURINARY: No dysuria, frequency, hematuria, or incontinence  NEUROLOGICAL: No headaches, memory loss, loss of strength, numbness, or tremors  SKIN: No itching, burning, rashes, or lesions   LYMPH NODES: No enlarged glands  ENDOCRINE: No heat or cold intolerance; No hair loss  MUSCULOSKELETAL: No joint pain or swelling; No muscle, back, or extremity pain  PSYCHIATRIC: No depression, anxiety, mood swings, or difficulty sleeping  HEME/LYMPH: No easy bruising, or bleeding gums  ALLERY AND IMMUNOLOGIC: No hives or eczema    MEDICATIONS  (STANDING):  amLODIPine   Tablet 5 milliGRAM(s) Oral daily  clopidogrel Tablet 75 milliGRAM(s) Oral daily  digoxin     Tablet 0.125 milliGRAM(s) Oral daily  enalapril 2.5 milliGRAM(s) Oral daily  escitalopram 15 milliGRAM(s) Oral daily  heparin  Injectable 5000 Unit(s) SubCutaneous two times a day  latanoprost 0.005% Ophthalmic Solution 1 Drop(s) Both EYES at bedtime  metoprolol tartrate 25 milliGRAM(s) Oral every 12 hours  pantoprazole    Tablet 40 milliGRAM(s) Oral before breakfast  timolol 0.5% Solution 1 Drop(s) Left EYE daily    MEDICATIONS  (PRN):  acetaminophen   Tablet .. 650 milliGRAM(s) Oral every 6 hours PRN Temp greater or equal to 38C (100.4F), Moderate Pain (4 - 6)  polyethylene glycol 3350 17 Gram(s) Oral daily PRN Constipation      PHYSICAL EXAM:  Vital Signs Last 24 Hrs  T(C): 36.7 (2020 16:38), Max: 36.9 (2020 00:58)  T(F): 98 (2020 16:38), Max: 98.5 (2020 00:58)  HR: 72 (2020 16:38) (60 - 88)  BP: 147/93 (2020 16:38) (147/84 - 171/100)  BP(mean): --  RR: 20 (2020 16:38) (18 - 26)  SpO2: 100% (2020 16:38) (98% - 100%)  I&O's Summary    GENERAL: NAD, well-developed  HEAD:  Atraumatic, Normocephalic  EYES: EOMI, PERRLA, conjunctiva and sclera clear  NECK: Supple, No JVD  CHEST/LUNG: Clear to auscultation bilaterally; No wheeze  HEART: Regular rate and rhythm; No murmurs, rubs, or gallops  ABDOMEN: Soft, Nontender, Nondistended; Bowel sounds present  EXTREMITIES:  2+ Peripheral Pulses, No clubbing, cyanosis, or edema  PSYCH: AAOx3  NEUROLOGY: non-focal  SKIN: No rashes or lesions    LABS:  CAPILLARY BLOOD GLUCOSE                              16.5   15.11 )-----------( 290      ( 2020 15:35 )             53.5     01-20    x   |  x   |  x   ----------------------------<  x   4.4   |  x   |  x     Ca    9.2      2020 15:35    TPro  8.0  /  Alb  3.7  /  TBili  0.7  /  DBili  x   /  AST  55<H>  /  ALT  15  /  AlkPhos  101  01-20    PT/INR - ( 2020 15:35 )   PT: 13.2 sec;   INR: 1.14 ratio         PTT - ( 2020 15:35 )  PTT:31.8 sec      Urinalysis Basic - ( 2020 17:24 )    Color: Yellow / Appearance: Turbid / S.021 / pH: x  Gluc: x / Ketone: Negative  / Bili: Negative / Urobili: Negative   Blood: x / Protein: 300 mg/dL / Nitrite: Negative   Leuk Esterase: Large / RBC: 18 /hpf / WBC >50   Sq Epi: x / Non Sq Epi: 3 / Bacteria: Many        RADIOLOGY & ADDITIONAL TESTS:    Imaging Personally Reviewed:    Consultant(s) Notes Reviewed:      Care Discussed with Consultants/Other Providers:
Patient is a 89y old  Female who presents with a chief complaint of SOB (22 Jan 2020 12:22)      Any change in ROS: Pt uis doing ok: says she isnot sob     MEDICATIONS  (STANDING):  amLODIPine   Tablet 5 milliGRAM(s) Oral daily  cefTRIAXone   IVPB 1000 milliGRAM(s) IV Intermittent every 24 hours  clopidogrel Tablet 75 milliGRAM(s) Oral daily  digoxin     Tablet 0.125 milliGRAM(s) Oral daily  enalapril 2.5 milliGRAM(s) Oral daily  escitalopram 15 milliGRAM(s) Oral daily  heparin  Injectable 5000 Unit(s) SubCutaneous two times a day  latanoprost 0.005% Ophthalmic Solution 1 Drop(s) Both EYES at bedtime  methylPREDNISolone sodium succinate Injectable 20 milliGRAM(s) IV Push every 8 hours  metoprolol tartrate 25 milliGRAM(s) Oral every 12 hours  pantoprazole    Tablet 40 milliGRAM(s) Oral before breakfast  timolol 0.5% Solution 1 Drop(s) Left EYE daily    MEDICATIONS  (PRN):  acetaminophen   Tablet .. 650 milliGRAM(s) Oral every 6 hours PRN Temp greater or equal to 38C (100.4F), Moderate Pain (4 - 6)  albuterol/ipratropium for Nebulization. 3 milliLiter(s) Nebulizer every 6 hours PRN Shortness of Breath and/or Wheezing  polyethylene glycol 3350 17 Gram(s) Oral daily PRN Constipation    Vital Signs Last 24 Hrs  T(C): 36.4 (23 Jan 2020 11:54), Max: 36.6 (23 Jan 2020 03:51)  T(F): 97.6 (23 Jan 2020 11:54), Max: 97.8 (23 Jan 2020 03:51)  HR: 75 (23 Jan 2020 11:54) (60 - 87)  BP: 151/75 (23 Jan 2020 11:54) (151/75 - 163/99)  BP(mean): --  RR: 18 (23 Jan 2020 11:54) (18 - 18)  SpO2: 91% (23 Jan 2020 11:54) (91% - 99%)    I&O's Summary    22 Jan 2020 07:01  -  23 Jan 2020 07:00  --------------------------------------------------------  IN: 120 mL / OUT: 0 mL / NET: 120 mL          Physical Exam:   GENERAL: NAD, well-groomed, well-developed  HEENT: REINALDO/   Atraumatic, Normocephalic  ENMT: No tonsillar erythema, exudates, or enlargement; Moist mucous membranes, Good dentition, No lesions  NECK: Supple, No JVD, Normal thyroid  CHEST/LUNG: Mild wheezing  CVS: Regular rate and rhythm; No murmurs, rubs, or gallops  GI: : Soft, Nontender, Nondistended; Bowel sounds present  NERVOUS SYSTEM:  Alert & awake  EXTREMITIES:  2+ Peripheral Pulses, No clubbing, cyanosis, or edema  LYMPH: No lymphadenopathy noted  SKIN: No rashes or lesions  ENDOCRINOLOGY: No Thyromegaly  PSYCH: Appropriate    Labs:  ABG - ( 22 Jan 2020 12:27 )  pH, Arterial: 7.48  pH, Blood: x     /  pCO2: 31    /  pO2: 56    / HCO3: 23    / Base Excess: .8    /  SaO2: 89              27                            14.7   12.03 )-----------( 293      ( 23 Jan 2020 07:58 )             44.4                         14.7   12.92 )-----------( 244      ( 22 Jan 2020 08:27 )             46.3                         16.5   15.11 )-----------( 290      ( 20 Jan 2020 15:35 )             53.5     01-23    145  |  108  |  29<H>  ----------------------------<  157<H>  3.7   |  22  |  0.52  01-22    139  |  103  |  16  ----------------------------<  81  3.5   |  23  |  0.52  01-20    x   |  x   |  x   ----------------------------<  x   4.4   |  x   |  x   01-20    139  |  102  |  15  ----------------------------<  126<H>  6.3<HH>   |  24  |  0.60    Ca    9.0      23 Jan 2020 05:43  Ca    9.1      22 Jan 2020 08:27  Phos  2.9     01-22  Mg     2.0     01-22    TPro  8.0  /  Alb  3.7  /  TBili  0.7  /  DBili  x   /  AST  55<H>  /  ALT  15  /  AlkPhos  101  01-20    CAPILLARY BLOOD GLUCOSE          < from: Xray Chest 1 View- PORTABLE-Urgent (01.20.20 @ 15:31) >    EXAM:  XR CHEST PORTABLE URGENT 1V                            PROCEDURE DATE:  01/20/2020            INTERPRETATION:  CLINICAL INFORMATION: Shortness of breath.    EXAM: AP chest radiograph.    COMPARISON: Chest radiograph from 3/30/2019.    FINDINGS:  Heart size cannot be properly assessed in this projection.  No focal consolidations. No evidence of pleural effusion or pneumothorax.  The visualized osseous structures demonstrate no acute pathology.    IMPRESSION:  Clear lungs.                IGOR SMITH M.D., RADIOLOGY RESIDENT  This document has been electronically signed.  THOR VIDAL M.D., ATTENDING RADIOLOGIST  This document has been electronically signed. Jan 21 2020 10:29AM        < end of copied text >        Serum Pro-Brain Natriuretic Peptide: 1389 pg/mL (01-20 @ 15:35)        RECENT CULTURES:  01-20 @ 19:07 .Blood Blood                No growth to date.    01-20 @ 18:52 .Urine Catheterized   [facility]      Escherichia coli  Escherichia coli     >100,000 CFU/ml Escherichia coli          RESPIRATORY CULTURES:          Studies  Chest X-RAY  CT SCAN Chest   Venous Dopplers: LE:   CT Abdomen  Others
Patient is a 89y old  Female who presents with a chief complaint of Wheezing (28 Jan 2020 13:55)      Any change in ROS:   has gurgling in throat:  no sig reps distress   MEDICATIONS  (STANDING):  albuterol/ipratropium for Nebulization. 3 milliLiter(s) Nebulizer every 6 hours  amLODIPine   Tablet 5 milliGRAM(s) Oral daily  clopidogrel Tablet 75 milliGRAM(s) Oral daily  digoxin     Tablet 0.125 milliGRAM(s) Oral daily  enalapril 5 milliGRAM(s) Oral daily  escitalopram 15 milliGRAM(s) Oral daily  guaiFENesin  milliGRAM(s) Oral every 12 hours  heparin  Injectable 5000 Unit(s) SubCutaneous two times a day  latanoprost 0.005% Ophthalmic Solution 1 Drop(s) Both EYES at bedtime  metoprolol tartrate 25 milliGRAM(s) Oral every 12 hours  pantoprazole    Tablet 40 milliGRAM(s) Oral before breakfast  piperacillin/tazobactam IVPB.. 3.375 Gram(s) IV Intermittent every 8 hours  timolol 0.5% Solution 1 Drop(s) Left EYE daily    MEDICATIONS  (PRN):  acetaminophen   Tablet .. 650 milliGRAM(s) Oral every 6 hours PRN Temp greater or equal to 38C (100.4F), Moderate Pain (4 - 6)  polyethylene glycol 3350 17 Gram(s) Oral daily PRN Constipation    Vital Signs Last 24 Hrs  T(C): 36.5 (29 Jan 2020 04:30), Max: 36.5 (29 Jan 2020 04:30)  T(F): 97.7 (29 Jan 2020 04:30), Max: 97.7 (29 Jan 2020 04:30)  HR: 79 (29 Jan 2020 04:30) (56 - 79)  BP: 150/64 (29 Jan 2020 04:30) (150/64 - 168/98)  BP(mean): --  RR: 20 (29 Jan 2020 04:30) (18 - 20)  SpO2: 90% (29 Jan 2020 04:30) (88% - 100%)    I&O's Summary    28 Jan 2020 07:01  -  29 Jan 2020 07:00  --------------------------------------------------------  IN: 565 mL / OUT: 0 mL / NET: 565 mL          Physical Exam:   GENERAL: NAD, well-groomed, well-developed  HEENT: REINALDO/   Atraumatic, Normocephalic  ENMT: No tonsillar erythema, exudates, or enlargement; Moist mucous membranes, Good dentition, No lesions  NECK: Supple, girgling sounds in throat:   CHEST/LUNG: Clear to auscultaion, ; No rales, rhonchi, wheezing, or rubs  CVS: Regular rate and rhythm; No murmurs, rubs, or gallops  GI: : Soft, Nontender, Nondistended; Bowel sounds present  NERVOUS SYSTEM:  Alert & awake  EXTREMITIES:  - edema  LYMPH: No lymphadenopathy noted  SKIN: No rashes or lesions  ENDOCRINOLOGY: No Thyromegaly  PSYCH:calm    Labs:                              15.6   18.30 )-----------( 310      ( 29 Jan 2020 06:12 )             50.3                         15.9   22.50 )-----------( 317      ( 28 Jan 2020 07:59 )             49.4                         15.4   20.71 )-----------( 312      ( 27 Jan 2020 09:21 )             49.5     01-29    145  |  109<H>  |  22  ----------------------------<  111<H>  3.6   |  24  |  0.54  01-28    144  |  108  |  21  ----------------------------<  100<H>  3.5   |  24  |  0.49<L>  01-27    144  |  107  |  27<H>  ----------------------------<  120<H>  3.1<L>   |  24  |  0.53    Ca    8.5      29 Jan 2020 06:12  Ca    8.4      28 Jan 2020 06:24      CAPILLARY BLOOD GLUCOSE          < from: Xray Chest 1 View- PORTABLE-Urgent (01.28.20 @ 14:07) >    EXAM:  XR CHEST PORTABLE URGENT 1V                            PROCEDURE DATE:  01/28/2020            INTERPRETATION:  CLINICAL INFORMATION: Hypoxia.    EXAM: Frontal chest radiograph dated 1/28/2020.    COMPARISON: Chest radiograph from 1/24/2020.    FINDINGS:  Minimal left basilar atelectasis  There are no pleural effusions or pneumothorax.  The cardiomediastinal silhouette is enlarged.    IMPRESSION:   Minimal left basilar atelectasis                KINZA BARTH M.D., RADIOLOGY RESIDENT  This document has been electronically signed.  KELY MOSHER M.D., ATTENDING RADIOLOGIST  This document has been electronically signed. Jan 28 2020  3:25PM    < end of copied text >              RECENT CULTURES:        RESPIRATORY CULTURES:          Studies  Chest X-RAY  CT SCAN Chest   Venous Dopplers: LE:   CT Abdomen  Others
Patient is a 89y old  Female who presents with a chief complaint of reps distress (26 Jan 2020 10:58)      Any change in ROS: pt is doing ok : no SOB     MEDICATIONS  (STANDING):  albuterol/ipratropium for Nebulization. 3 milliLiter(s) Nebulizer every 6 hours  amLODIPine   Tablet 5 milliGRAM(s) Oral daily  clopidogrel Tablet 75 milliGRAM(s) Oral daily  digoxin     Tablet 0.125 milliGRAM(s) Oral daily  enalapril 2.5 milliGRAM(s) Oral daily  escitalopram 15 milliGRAM(s) Oral daily  guaiFENesin  milliGRAM(s) Oral every 12 hours  heparin  Injectable 5000 Unit(s) SubCutaneous two times a day  latanoprost 0.005% Ophthalmic Solution 1 Drop(s) Both EYES at bedtime  metoprolol tartrate 25 milliGRAM(s) Oral every 12 hours  pantoprazole    Tablet 40 milliGRAM(s) Oral before breakfast  piperacillin/tazobactam IVPB.. 3.375 Gram(s) IV Intermittent every 8 hours  potassium chloride   Solution 40 milliEquivalent(s) Oral every 4 hours  timolol 0.5% Solution 1 Drop(s) Left EYE daily    MEDICATIONS  (PRN):  acetaminophen   Tablet .. 650 milliGRAM(s) Oral every 6 hours PRN Temp greater or equal to 38C (100.4F), Moderate Pain (4 - 6)  polyethylene glycol 3350 17 Gram(s) Oral daily PRN Constipation    Vital Signs Last 24 Hrs  T(C): 36.6 (27 Jan 2020 11:19), Max: 36.7 (26 Jan 2020 15:00)  T(F): 97.8 (27 Jan 2020 11:19), Max: 98.1 (26 Jan 2020 20:38)  HR: 58 (27 Jan 2020 11:19) (58 - 85)  BP: 168/85 (27 Jan 2020 11:19) (135/70 - 171/72)  BP(mean): --  RR: 20 (27 Jan 2020 11:19) (18 - 20)  SpO2: 94% (27 Jan 2020 11:19) (93% - 95%)    I&O's Summary    26 Jan 2020 07:01  -  27 Jan 2020 07:00  --------------------------------------------------------  IN: 440 mL / OUT: 0 mL / NET: 440 mL    27 Jan 2020 07:01  -  27 Jan 2020 14:14  --------------------------------------------------------  IN: 120 mL / OUT: 0 mL / NET: 120 mL          Physical Exam:   GENERAL: NAD, well-groomed, well-developed  HEENT: REINALDO/   Atraumatic, Normocephalic  ENMT: No tonsillar erythema, exudates, or enlargement; Moist mucous membranes, Good dentition, No lesions  NECK: Supple, No JVD, Normal thyroid  CHEST/LUNG:no wheezing  CVS: Regular rate and rhythm; No murmurs, rubs, or gallops  GI: : Soft, Nontender, Nondistended; Bowel sounds present  NERVOUS SYSTEM:  Alert & awake  EXTREMITIES:  2+ Peripheral Pulses, No clubbing, cyanosis, or edema  LYMPH: No lymphadenopathy noted  SKIN: No rashes or lesions  ENDOCRINOLOGY: No Thyromegaly  PSYCH: calm    Labs:  27                            15.4   20.71 )-----------( 312      ( 27 Jan 2020 09:21 )             49.5                         14.3   11.54 )-----------( 312      ( 25 Jan 2020 09:21 )             45.8                         14.4   15.51 )-----------( 356      ( 24 Jan 2020 08:28 )             45.2     01-27    144  |  107  |  27<H>  ----------------------------<  120<H>  3.1<L>   |  24  |  0.53  01-25    149<H>  |  112<H>  |  30<H>  ----------------------------<  167<H>  3.8   |  25  |  0.59  01-24    142  |  105  |  41<H>  ----------------------------<  161<H>  3.3<L>   |  22  |  0.63    Ca    8.7      27 Jan 2020 09:19  Mg     2.0     01-27    TPro  6.4  /  Alb  3.1<L>  /  TBili  0.4  /  DBili  x   /  AST  19  /  ALT  16  /  AlkPhos  66  01-25    CAPILLARY BLOOD GLUCOSE      POCT Blood Glucose.: 102 mg/dL (27 Jan 2020 06:18)  POCT Blood Glucose.: 109 mg/dL (26 Jan 2020 19:27)        < from: Xray Chest 1 View- PORTABLE-Urgent (01.24.20 @ 10:10) >    EXAM:  XR CHEST PORTABLE URGENT 1V                            PROCEDURE DATE:  01/24/2020            INTERPRETATION:  Clinical history: Shortness of breath    Comparison is made to prior study of 4 days prior.    A single frontal view of the chest demonstrates no evidence of consolidation or pneumothorax. There is small left effusion.                    USAMA PALACIOS M.D. ATTENDING RADIOLOGIST  This document has been electronically signed. Jan 24 2020  1:44PM    < end of copied text >      Procalcitonin, Serum: 0.05 ng/mL (01-25 @ 06:03)        RECENT CULTURES:  01-20 @ 19:07 .Blood Blood                No growth at 5 days.    01-20 @ 18:52 .Urine Catheterized   [facility]      Escherichia coli  Escherichia coli     >100,000 CFU/ml Escherichia coli          RESPIRATORY CULTURES:          Studies  Chest X-RAY  CT SCAN Chest   Venous Dopplers: LE:   CT Abdomen  Others
Patient is a 89y old  Female who presents with a chief complaint of reps distress (26 Jan 2020 10:58)    doing poorly:    Any change in ROS:     MEDICATIONS  (STANDING):  albuterol/ipratropium for Nebulization. 3 milliLiter(s) Nebulizer every 6 hours  amLODIPine   Tablet 5 milliGRAM(s) Oral daily  clopidogrel Tablet 75 milliGRAM(s) Oral daily  digoxin     Tablet 0.125 milliGRAM(s) Oral daily  enalapril 5 milliGRAM(s) Oral daily  escitalopram 15 milliGRAM(s) Oral daily  guaiFENesin  milliGRAM(s) Oral every 12 hours  heparin  Injectable 5000 Unit(s) SubCutaneous two times a day  latanoprost 0.005% Ophthalmic Solution 1 Drop(s) Both EYES at bedtime  metoprolol tartrate 25 milliGRAM(s) Oral every 12 hours  pantoprazole    Tablet 40 milliGRAM(s) Oral before breakfast  piperacillin/tazobactam IVPB.. 3.375 Gram(s) IV Intermittent every 8 hours  timolol 0.5% Solution 1 Drop(s) Left EYE daily    MEDICATIONS  (PRN):  acetaminophen   Tablet .. 650 milliGRAM(s) Oral every 6 hours PRN Temp greater or equal to 38C (100.4F), Moderate Pain (4 - 6)  polyethylene glycol 3350 17 Gram(s) Oral daily PRN Constipation    Vital Signs Last 24 Hrs  T(C): 36.4 (28 Jan 2020 11:55), Max: 36.6 (28 Jan 2020 05:20)  T(F): 97.6 (28 Jan 2020 11:55), Max: 97.8 (28 Jan 2020 05:20)  HR: 64 (28 Jan 2020 11:55) (64 - 79)  BP: 167/109 (28 Jan 2020 11:55) (162/87 - 168/96)  BP(mean): --  RR: 20 (28 Jan 2020 11:55) (18 - 20)  SpO2: 100% (28 Jan 2020 11:55) (93% - 100%)    I&O's Summary    27 Jan 2020 07:01  -  28 Jan 2020 07:00  --------------------------------------------------------  IN: 240 mL / OUT: 0 mL / NET: 240 mL          Physical Exam:   GENERAL: NAD, well-groomed, well-developed  HEENT: REINALDO/   Atraumatic, Normocephalic  ENMT: No tonsillar erythema, exudates, or enlargement; Moist mucous membranes, Good dentition, No lesions  NECK: Supple, No JVD, Normal thyroid  CHEST/LUNG: coarse  breath sounds  CVS: Regular rate and rhythm; No murmurs, rubs, or gallops  GI: : Soft, Nontender, Nondistended; Bowel sounds present  NERVOUS SYSTEM:  Alert & Awake  EXTREMITIES: - edema  LYMPH: No lymphadenopathy noted  SKIN: No rashes or lesions  ENDOCRINOLOGY: No Thyromegaly  PSYCH: calm    Labs:                              15.9   22.50 )-----------( 317      ( 28 Jan 2020 07:59 )             49.4                         15.4   20.71 )-----------( 312      ( 27 Jan 2020 09:21 )             49.5                         14.3   11.54 )-----------( 312      ( 25 Jan 2020 09:21 )             45.8     01-28    144  |  108  |  21  ----------------------------<  100<H>  3.5   |  24  |  0.49<L>  01-27    144  |  107  |  27<H>  ----------------------------<  120<H>  3.1<L>   |  24  |  0.53  01-25    149<H>  |  112<H>  |  30<H>  ----------------------------<  167<H>  3.8   |  25  |  0.59    Ca    8.4      28 Jan 2020 06:24  Ca    8.7      27 Jan 2020 09:19  Mg     2.0     01-27    TPro  6.4  /  Alb  3.1<L>  /  TBili  0.4  /  DBili  x   /  AST  19  /  ALT  16  /  AlkPhos  66  01-25    CAPILLARY BLOOD GLUCOSE      POCT Blood Glucose.: 95 mg/dL (28 Jan 2020 06:40)  POCT Blood Glucose.: 105 mg/dL (28 Jan 2020 00:33)      < from: Xray Chest 1 View- PORTABLE-Urgent (01.24.20 @ 10:10) >    EXAM:  XR CHEST PORTABLE URGENT 1V                            PROCEDURE DATE:  01/24/2020            INTERPRETATION:  Clinical history: Shortness of breath    Comparison is made to prior study of 4 days prior.    A single frontal view of the chest demonstrates no evidence of consolidation or pneumothorax. There is small left effusion.                    USAMA PALACIOS M.D. ATTENDING RADIOLOGIST  This document has been electronically signed. Jan 24 2020  1:44PM    < end of copied text >        Procalcitonin, Serum: 0.05 ng/mL (01-25 @ 06:03)        RECENT CULTURES:        RESPIRATORY CULTURES:          Studies  Chest X-RAY  CT SCAN Chest   Venous Dopplers: LE:   CT Abdomen  Others
Patient is a 89y old  Female who presents with a chief complaint of resp distress (25 Jan 2020 12:43)      Any change in ROS: Doing ok : looks a littel better today : no resp distress    MEDICATIONS  (STANDING):  albuterol/ipratropium for Nebulization. 3 milliLiter(s) Nebulizer every 6 hours  amLODIPine   Tablet 5 milliGRAM(s) Oral daily  clopidogrel Tablet 75 milliGRAM(s) Oral daily  digoxin     Tablet 0.125 milliGRAM(s) Oral daily  enalapril 2.5 milliGRAM(s) Oral daily  escitalopram 15 milliGRAM(s) Oral daily  guaiFENesin  milliGRAM(s) Oral every 12 hours  heparin  Injectable 5000 Unit(s) SubCutaneous two times a day  latanoprost 0.005% Ophthalmic Solution 1 Drop(s) Both EYES at bedtime  metoprolol tartrate 25 milliGRAM(s) Oral every 12 hours  pantoprazole    Tablet 40 milliGRAM(s) Oral before breakfast  piperacillin/tazobactam IVPB.. 3.375 Gram(s) IV Intermittent every 8 hours  timolol 0.5% Solution 1 Drop(s) Left EYE daily    MEDICATIONS  (PRN):  acetaminophen   Tablet .. 650 milliGRAM(s) Oral every 6 hours PRN Temp greater or equal to 38C (100.4F), Moderate Pain (4 - 6)  polyethylene glycol 3350 17 Gram(s) Oral daily PRN Constipation    Vital Signs Last 24 Hrs  T(C): 36.6 (26 Jan 2020 04:00), Max: 36.6 (26 Jan 2020 04:00)  T(F): 97.8 (26 Jan 2020 04:00), Max: 97.8 (26 Jan 2020 04:00)  HR: 65 (26 Jan 2020 04:00) (53 - 86)  BP: 150/70 (26 Jan 2020 04:00) (150/70 - 160/72)  BP(mean): --  RR: 19 (26 Jan 2020 04:00) (19 - 20)  SpO2: 92% (26 Jan 2020 04:00) (91% - 95%)    I&O's Summary    25 Jan 2020 07:01  -  26 Jan 2020 07:00  --------------------------------------------------------  IN: 480 mL / OUT: 0 mL / NET: 480 mL          Physical Exam:   GENERAL: NAD, well-groomed, well-developed  HEENT: REINALDO/   Atraumatic, Normocephalic  ENMT: No tonsillar erythema, exudates, or enlargement; Moist mucous membranes, Good dentition, No lesions  NECK: Supple, No JVD, Normal thyroid  CHEST/LUNG: bibasilar crackles+  CVS: Regular rate and rhythm; No murmurs, rubs, or gallops  GI: : Soft, Nontender, Nondistended; Bowel sounds present  NERVOUS SYSTEM:  Alert & Oriented X1-2  EXTREMITIES: -edema  LYMPH: No lymphadenopathy noted  SKIN: No rashes or lesions  ENDOCRINOLOGY: No Thyromegaly  PSYCH: Confused    Labs:  27                            14.3   11.54 )-----------( 312      ( 25 Jan 2020 09:21 )             45.8                         14.4   15.51 )-----------( 356      ( 24 Jan 2020 08:28 )             45.2                         14.7   12.03 )-----------( 293      ( 23 Jan 2020 07:58 )             44.4     01-25    149<H>  |  112<H>  |  30<H>  ----------------------------<  167<H>  3.8   |  25  |  0.59  01-24    142  |  105  |  41<H>  ----------------------------<  161<H>  3.3<L>   |  22  |  0.63  01-23    145  |  108  |  29<H>  ----------------------------<  157<H>  3.7   |  22  |  0.52    Ca    8.9      25 Jan 2020 06:03  Mg     2.2     01-25    TPro  6.4  /  Alb  3.1<L>  /  TBili  0.4  /  DBili  x   /  AST  19  /  ALT  16  /  AlkPhos  66  01-25    CAPILLARY BLOOD GLUCOSE      POCT Blood Glucose.: 110 mg/dL (26 Jan 2020 05:35)  POCT Blood Glucose.: 131 mg/dL (26 Jan 2020 00:16)  POCT Blood Glucose.: 111 mg/dL (25 Jan 2020 18:20)  POCT Blood Glucose.: 109 mg/dL (25 Jan 2020 12:22)      LIVER FUNCTIONS - ( 25 Jan 2020 06:03 )  Alb: 3.1 g/dL / Pro: 6.4 g/dL / ALK PHOS: 66 U/L / ALT: 16 U/L / AST: 19 U/L / GGT: x               Procalcitonin, Serum: 0.05 ng/mL (01-25 @ 06:03)        RECENT CULTURES:  01-20 @ 19:07 .Blood Blood   < from: Xray Chest 1 View- PORTABLE-Urgent (01.24.20 @ 10:10) >    EXAM:  XR CHEST PORTABLE URGENT 1V                            PROCEDURE DATE:  01/24/2020            INTERPRETATION:  Clinical history: Shortness of breath    Comparison is made to prior study of 4 days prior.    A single frontal view of the chest demonstrates no evidence of consolidation or pneumothorax. There is small left effusion.                    USAMA PALACIOS M.D. ATTENDING RADIOLOGIST  This document has been electronically signed. Jan 24 2020  1:44PM              < end of copied text >               No growth at 5 days.    01-20 @ 18:52 .Urine Catheterized   B[facility]      Escherichia coli  Escherichia coli     >100,000 CFU/ml Escherichia coli          RESPIRATORY CULTURES:          Studies  Chest X-RAY  CT SCAN Chest   Venous Dopplers: LE:   CT Abdomen  Others
Patient is a 89y old  Female who presents with a chief complaint of resp distress (29 Jan 2020 10:14)      Any change in ROS: no resp distress"has gurgling sound    MEDICATIONS  (STANDING):  albuterol/ipratropium for Nebulization. 3 milliLiter(s) Nebulizer every 6 hours  amLODIPine   Tablet 5 milliGRAM(s) Oral daily  clopidogrel Tablet 75 milliGRAM(s) Oral daily  digoxin     Tablet 0.125 milliGRAM(s) Oral daily  enalapril 5 milliGRAM(s) Oral daily  escitalopram 15 milliGRAM(s) Oral daily  guaiFENesin  milliGRAM(s) Oral every 12 hours  heparin  Injectable 5000 Unit(s) SubCutaneous two times a day  latanoprost 0.005% Ophthalmic Solution 1 Drop(s) Both EYES at bedtime  pantoprazole    Tablet 40 milliGRAM(s) Oral before breakfast  timolol 0.5% Solution 1 Drop(s) Left EYE daily    MEDICATIONS  (PRN):  acetaminophen   Tablet .. 650 milliGRAM(s) Oral every 6 hours PRN Temp greater or equal to 38C (100.4F), Moderate Pain (4 - 6)  polyethylene glycol 3350 17 Gram(s) Oral daily PRN Constipation    Vital Signs Last 24 Hrs  T(C): 36.6 (30 Jan 2020 11:11), Max: 36.8 (30 Jan 2020 04:13)  T(F): 97.9 (30 Jan 2020 11:11), Max: 98.3 (30 Jan 2020 04:13)  HR: 66 (30 Jan 2020 11:11) (58 - 67)  BP: 132/87 (30 Jan 2020 11:11) (110/75 - 159/78)  BP(mean): --  RR: 18 (30 Jan 2020 11:11) (18 - 20)  SpO2: 94% (30 Jan 2020 11:11) (88% - 96%)    I&O's Summary    29 Jan 2020 07:01  -  30 Jan 2020 07:00  --------------------------------------------------------  IN: 150 mL / OUT: 0 mL / NET: 150 mL          Physical Exam:   GENERAL: NAD, well-groomed, well-developed  HEENT: REINALDO/   Atraumatic, Normocephalic  ENMT: No tonsillar erythema, exudates, or enlargement; Moist mucous membranes, Good dentition, No lesions  NECK: Supple, No JVD, Normal thyroid  CHEST/LUNG: coarse breath sounds  CVS: Regular rate and rhythm; No murmurs, rubs, or gallops  GI: : Soft, Nontender, Nondistended; Bowel sounds present  NERVOUS SYSTEM:  Alert & awake: confused  EXTREMITIES:-nathaniel  LYMPH: No lymphadenopathy noted  SKIN: No rashes or lesions  ENDOCRINOLOGY: No Thyromegaly  PSYCH: Appropriate    Labs:                              16.1   18.48 )-----------( 322      ( 30 Jan 2020 06:06 )             53.1                         15.6   18.30 )-----------( 310      ( 29 Jan 2020 06:12 )             50.3                         15.9   22.50 )-----------( 317      ( 28 Jan 2020 07:59 )             49.4                         15.4   20.71 )-----------( 312      ( 27 Jan 2020 09:21 )             49.5     01-30    145  |  110<H>  |  23  ----------------------------<  108<H>  3.1<L>   |  22  |  0.52  01-29    145  |  109<H>  |  22  ----------------------------<  111<H>  3.6   |  24  |  0.54  01-28    144  |  108  |  21  ----------------------------<  100<H>  3.5   |  24  |  0.49<L>  01-27    144  |  107  |  27<H>  ----------------------------<  120<H>  3.1<L>   |  24  |  0.53    Ca    8.5      30 Jan 2020 06:04  Ca    8.5      29 Jan 2020 06:12      CAPILLARY BLOOD GLUCOSE        < from: Xray Chest 1 View- PORTABLE-Urgent (01.28.20 @ 14:07) >    EXAM:  XR CHEST PORTABLE URGENT 1V                            PROCEDURE DATE:  01/28/2020            INTERPRETATION:  CLINICAL INFORMATION: Hypoxia.    EXAM: Frontal chest radiograph dated 1/28/2020.    COMPARISON: Chest radiograph from 1/24/2020.    FINDINGS:  Minimal left basilar atelectasis  There are no pleural effusions or pneumothorax.  The cardiomediastinal silhouette is enlarged.    IMPRESSION:   Minimal left basilar atelectasis                KINZA BARTH M.D., RADIOLOGY RESIDENT  This document has been electronically signed.  KELY MOSHER M.D., ATTENDING RADIOLOGIST  This document has been electronically signed. Jan 28 2020  3:25PM    < end of copied text >                RECENT CULTURES:        RESPIRATORY CULTURES:          Studies  Chest X-RAY  CT SCAN Chest   Venous Dopplers: LE:   CT Abdomen  Others
Patient is a 89y old  Female who presents with a chief complaint of sob (23 Jan 2020 13:40)      Any change in ROS: Mild SOB today :  some what lethargic but open eyes and tries to respond     MEDICATIONS  (STANDING):  albuterol/ipratropium for Nebulization. 3 milliLiter(s) Nebulizer every 6 hours  amLODIPine   Tablet 5 milliGRAM(s) Oral daily  cefTRIAXone   IVPB 1000 milliGRAM(s) IV Intermittent every 24 hours  clopidogrel Tablet 75 milliGRAM(s) Oral daily  digoxin     Tablet 0.125 milliGRAM(s) Oral daily  enalapril 2.5 milliGRAM(s) Oral daily  escitalopram 15 milliGRAM(s) Oral daily  guaiFENesin  milliGRAM(s) Oral every 12 hours  heparin  Injectable 5000 Unit(s) SubCutaneous two times a day  latanoprost 0.005% Ophthalmic Solution 1 Drop(s) Both EYES at bedtime  methylPREDNISolone sodium succinate Injectable 20 milliGRAM(s) IV Push every 8 hours  metoprolol tartrate 25 milliGRAM(s) Oral every 12 hours  pantoprazole    Tablet 40 milliGRAM(s) Oral before breakfast  potassium chloride   Powder 40 milliEquivalent(s) Oral once  timolol 0.5% Solution 1 Drop(s) Left EYE daily    MEDICATIONS  (PRN):  acetaminophen   Tablet .. 650 milliGRAM(s) Oral every 6 hours PRN Temp greater or equal to 38C (100.4F), Moderate Pain (4 - 6)  polyethylene glycol 3350 17 Gram(s) Oral daily PRN Constipation    Vital Signs Last 24 Hrs  T(C): 36.2 (24 Jan 2020 04:22), Max: 36.4 (23 Jan 2020 11:54)  T(F): 97.2 (24 Jan 2020 04:22), Max: 97.6 (23 Jan 2020 11:54)  HR: 86 (24 Jan 2020 04:22) (75 - 86)  BP: 155/79 (24 Jan 2020 04:22) (151/75 - 159/78)  BP(mean): --  RR: 18 (24 Jan 2020 04:22) (18 - 18)  SpO2: 94% (24 Jan 2020 04:22) (91% - 96%)    I&O's Summary    23 Jan 2020 07:01  -  24 Jan 2020 07:00  --------------------------------------------------------  IN: 1250 mL / OUT: 0 mL / NET: 1250 mL          Physical Exam:   GENERAL: NAD, well-groomed, well-developed  HEENT: REINALDO/   Atraumatic, Normocephalic  ENMT: No tonsillar erythema, exudates, or enlargement; Moist mucous membranes, Good dentition, No lesions  NECK: Supple, No JVD, Normal thyroid  CHEST/LUNG: wheezing+  CVS: Regular rate and rhythm; No murmurs, rubs, or gallops  GI: : Soft, Nontender, Nondistended; Bowel sounds present  NERVOUS SYSTEM:  awake  EXTREMITIES:  2+ Peripheral Pulses, No clubbing, cyanosis, or edema  LYMPH: No lymphadenopathy noted  SKIN: No rashes or lesions  ENDOCRINOLOGY: No Thyromegaly  PSYCH: lethargic mild    Labs:  ABG - ( 24 Jan 2020 06:50 )  pH, Arterial: 7.52  pH, Blood: x     /  pCO2: 32    /  pO2: 68    / HCO3: 26    / Base Excess: 3.8   /  SaO2: 91              27                            14.4   15.51 )-----------( 356      ( 24 Jan 2020 08:28 )             45.2                         14.7   12.03 )-----------( 293      ( 23 Jan 2020 07:58 )             44.4                         14.7   12.92 )-----------( 244      ( 22 Jan 2020 08:27 )             46.3                         16.5   15.11 )-----------( 290      ( 20 Jan 2020 15:35 )             53.5     01-24    142  |  105  |  41<H>  ----------------------------<  161<H>  3.3<L>   |  22  |  0.63  01-23    145  |  108  |  29<H>  ----------------------------<  157<H>  3.7   |  22  |  0.52  01-22    139  |  103  |  16  ----------------------------<  81  3.5   |  23  |  0.52  01-20    x   |  x   |  x   ----------------------------<  x   4.4   |  x   |  x   01-20    139  |  102  |  15  ----------------------------<  126<H>  6.3<HH>   |  24  |  0.60    Ca    8.9      24 Jan 2020 05:50  Ca    9.0      23 Jan 2020 05:43  Mg     2.2     01-24    TPro  8.0  /  Alb  3.7  /  TBili  0.7  /  DBili  x   /  AST  55<H>  /  ALT  15  /  AlkPhos  101  01-20    CAPILLARY BLOOD GLUCOSE              < from: Xray Chest 1 View- PORTABLE-Urgent (01.20.20 @ 15:31) >    EXAM:  XR CHEST PORTABLE URGENT 1V                            PROCEDURE DATE:  01/20/2020            INTERPRETATION:  CLINICAL INFORMATION: Shortness of breath.    EXAM: AP chest radiograph.    COMPARISON: Chest radiograph from 3/30/2019.    FINDINGS:  Heart size cannot be properly assessed in this projection.  No focal consolidations. No evidence of pleural effusion or pneumothorax.  The visualized osseous structures demonstrate no acute pathology.    IMPRESSION:  Clear lungs.                IGOR SMITH M.D., RADIOLOGY RESIDENT  This document has been electronically signed.  THOR VIDAL M.D., ATTENDING RADIOLOGIST  This document has been electronically signed. Jan 21 2020 10:29AM    < end of copied text >          RECENT CULTURES:  01-20 @ 19:07 .Blood Blood                No growth to date.    01-20 @ 18:52 .Urine Catheterized   [facility]      Escherichia coli  Escherichia coli     >100,000 CFU/ml Escherichia coli          RESPIRATORY CULTURES:          Studies  Chest X-RAY  CT SCAN Chest   Venous Dopplers: LE:   CT Abdomen  Others
Patient is a 89y old  Female who presents with a chief complaint of sob (24 Jan 2020 10:50)      Any change in ROS: Doing same: mild tachypenic:     MEDICATIONS  (STANDING):  albuterol/ipratropium for Nebulization. 3 milliLiter(s) Nebulizer every 6 hours  amLODIPine   Tablet 5 milliGRAM(s) Oral daily  clopidogrel Tablet 75 milliGRAM(s) Oral daily  digoxin     Tablet 0.125 milliGRAM(s) Oral daily  enalapril 2.5 milliGRAM(s) Oral daily  escitalopram 15 milliGRAM(s) Oral daily  guaiFENesin  milliGRAM(s) Oral every 12 hours  heparin  Injectable 5000 Unit(s) SubCutaneous two times a day  latanoprost 0.005% Ophthalmic Solution 1 Drop(s) Both EYES at bedtime  methylPREDNISolone sodium succinate Injectable 20 milliGRAM(s) IV Push every 8 hours  metoprolol tartrate 25 milliGRAM(s) Oral every 12 hours  pantoprazole    Tablet 40 milliGRAM(s) Oral before breakfast  piperacillin/tazobactam IVPB.. 3.375 Gram(s) IV Intermittent every 8 hours  timolol 0.5% Solution 1 Drop(s) Left EYE daily    MEDICATIONS  (PRN):  acetaminophen   Tablet .. 650 milliGRAM(s) Oral every 6 hours PRN Temp greater or equal to 38C (100.4F), Moderate Pain (4 - 6)  polyethylene glycol 3350 17 Gram(s) Oral daily PRN Constipation    Vital Signs Last 24 Hrs  T(C): 36.2 (25 Jan 2020 12:10), Max: 36.6 (25 Jan 2020 05:35)  T(F): 97.2 (25 Jan 2020 12:10), Max: 97.8 (25 Jan 2020 05:35)  HR: 5 (25 Jan 2020 12:10) (5 - 72)  BP: 152/74 (25 Jan 2020 12:10) (140/85 - 162/70)  BP(mean): --  RR: 20 (25 Jan 2020 12:10) (18 - 20)  SpO2: 95% (25 Jan 2020 12:10) (92% - 95%)    I&O's Summary    24 Jan 2020 07:01  -  25 Jan 2020 07:00  --------------------------------------------------------  IN: 180 mL / OUT: 0 mL / NET: 180 mL          Physical Exam:   GENERAL: NAD, well-groomed, well-developed  HEENT: REINALDO/   Atraumatic, Normocephalic  ENMT: No tonsillar erythema, exudates, or enlargement; Moist mucous membranes, Good dentition, No lesions  NECK: Supple, No JVD, Normal thyroid  CHEST/LUNG: Cloarse breath sounds   CVS: Regular rate and rhythm; No murmurs, rubs, or gallops  GI: : Soft, Nontender, Nondistended; Bowel sounds present  NERVOUS SYSTEM:  Alert &Awke  EXTREMITIES:  - edema  LYMPH: No lymphadenopathy noted  SKIN: No rashes or lesions  ENDOCRINOLOGY: No Thyromegaly  PSYCH: sven    Labs:  ABG - ( 24 Jan 2020 06:50 )  pH, Arterial: 7.52  pH, Blood: x     /  pCO2: 32    /  pO2: 68    / HCO3: 26    / Base Excess: 3.8   /  SaO2: 91              27                            14.3   11.54 )-----------( 312      ( 25 Jan 2020 09:21 )             45.8                         14.4   15.51 )-----------( 356      ( 24 Jan 2020 08:28 )             45.2                         14.7   12.03 )-----------( 293      ( 23 Jan 2020 07:58 )             44.4                         14.7   12.92 )-----------( 244      ( 22 Jan 2020 08:27 )             46.3     01-25    149<H>  |  112<H>  |  30<H>  ----------------------------<  167<H>  3.8   |  25  |  0.59  01-24    142  |  105  |  41<H>  ----------------------------<  161<H>  3.3<L>   |  22  |  0.63  01-23    145  |  108  |  29<H>  ----------------------------<  157<H>  3.7   |  22  |  0.52  01-22    139  |  103  |  16  ----------------------------<  81  3.5   |  23  |  0.52    Ca    8.9      25 Jan 2020 06:03  Ca    8.9      24 Jan 2020 05:50  Mg     2.2     01-25  Mg     2.2     01-24    TPro  6.4  /  Alb  3.1<L>  /  TBili  0.4  /  DBili  x   /  AST  19  /  ALT  16  /  AlkPhos  66  01-25    CAPILLARY BLOOD GLUCOSE      POCT Blood Glucose.: 109 mg/dL (25 Jan 2020 12:22)  POCT Blood Glucose.: 134 mg/dL (25 Jan 2020 06:33)  POCT Blood Glucose.: 157 mg/dL (25 Jan 2020 01:12)      LIVER FUNCTIONS - ( 25 Jan 2020 06:03 )  Alb: 3.1 g/dL / Pro: 6.4 g/dL / ALK PHOS: 66 U/L / ALT: 16 U/L / AST: 19 U/L / GGT: x               Procalcitonin, Serum: 0.05 ng/mL (01-25 @ 06:03)        RECENT CULTURES:  01-20 @ 19:07 .Blood Blood     < from: Xray Chest 1 View- PORTABLE-Urgent (01.24.20 @ 10:10) >    EXAM:  XR CHEST PORTABLE URGENT 1V                            PROCEDURE DATE:  01/24/2020            INTERPRETATION:  Clinical history: Shortness of breath    Comparison is made to prior study of 4 days prior.    A single frontal view of the chest demonstrates no evidence of consolidation or pneumothorax. There is small left effusion.                    USAMA PALACIOS M.D. ATTENDING RADIOLOGIST  This document has been electronically signed. Jan 24 2020  1:44PM        < end of copied text >             No growth to date.    01-20 @ 18:52 .Urine Catheterized   [facility]      Escherichia coli  Escherichia coli     >100,000 CFU/ml Escherichia coli          RESPIRATORY CULTURES:          Studies  Chest X-RAY  CT SCAN Chest   Venous Dopplers: LE:   CT Abdomen  Others
Patient is a 89y old  Female who presents with a chief complaint of resp distress (30 Jan 2020 14:20)      Any change in ROS: pt is alert and awake:   no SOB : cough +    MEDICATIONS  (STANDING):  albuterol/ipratropium for Nebulization. 3 milliLiter(s) Nebulizer every 6 hours  amLODIPine   Tablet 5 milliGRAM(s) Oral daily  clopidogrel Tablet 75 milliGRAM(s) Oral daily  digoxin     Tablet 0.125 milliGRAM(s) Oral daily  enalapril 5 milliGRAM(s) Oral daily  escitalopram 15 milliGRAM(s) Oral daily  guaiFENesin  milliGRAM(s) Oral every 12 hours  heparin  Injectable 5000 Unit(s) SubCutaneous two times a day  latanoprost 0.005% Ophthalmic Solution 1 Drop(s) Both EYES at bedtime  pantoprazole    Tablet 40 milliGRAM(s) Oral before breakfast  timolol 0.5% Solution 1 Drop(s) Left EYE daily    MEDICATIONS  (PRN):  acetaminophen   Tablet .. 650 milliGRAM(s) Oral every 6 hours PRN Temp greater or equal to 38C (100.4F), Moderate Pain (4 - 6)  polyethylene glycol 3350 17 Gram(s) Oral daily PRN Constipation    Vital Signs Last 24 Hrs  T(C): 36.4 (31 Jan 2020 04:06), Max: 36.8 (30 Jan 2020 20:52)  T(F): 97.5 (31 Jan 2020 04:06), Max: 98.3 (30 Jan 2020 20:52)  HR: 84 (31 Jan 2020 04:06) (66 - 84)  BP: 159/97 (31 Jan 2020 04:06) (132/87 - 159/97)  BP(mean): --  RR: 18 (31 Jan 2020 04:06) (18 - 18)  SpO2: 93% (31 Jan 2020 04:06) (93% - 94%)    I&O's Summary    30 Jan 2020 07:01  -  31 Jan 2020 07:00  --------------------------------------------------------  IN: 240 mL / OUT: 0 mL / NET: 240 mL          Physical Exam:   GENERAL: NAD, well-groomed, well-developed  HEENT: REINALDO/   Atraumatic, Normocephalic  ENMT: No tonsillar erythema, exudates, or enlargement; Moist mucous membranes, Good dentition, No lesions  NECK: Supple, No JVD, Normal thyroid  CHEST/LUNG: Ccracklesc+  CVS: Regular rate and rhythm; No murmurs, rubs, or gallops  GI: : Soft, Nontender, Nondistended; Bowel sounds present  NERVOUS SYSTEM:  Alert & Awake  EXTREMITIES:  2+ Peripheral Pulses, No clubbing, cyanosis, or edema  LYMPH: No lymphadenopathy noted  SKIN: No rashes or lesions  ENDOCRINOLOGY: No Thyromegaly  PSYCH: calm+    Labs:                              16.8   16.88 )-----------( 348      ( 31 Jan 2020 05:57 )             53.0                         16.1   18.48 )-----------( 322      ( 30 Jan 2020 06:06 )             53.1                         15.6   18.30 )-----------( 310      ( 29 Jan 2020 06:12 )             50.3                         15.9   22.50 )-----------( 317      ( 28 Jan 2020 07:59 )             49.4     01-31    149<H>  |  114<H>  |  23  ----------------------------<  119<H>  4.1   |  21<L>  |  0.50  01-30    145  |  110<H>  |  23  ----------------------------<  108<H>  3.1<L>   |  22  |  0.52  01-29    145  |  109<H>  |  22  ----------------------------<  111<H>  3.6   |  24  |  0.54  01-28    144  |  108  |  21  ----------------------------<  100<H>  3.5   |  24  |  0.49<L>    Ca    8.8      31 Jan 2020 05:57  Ca    8.5      30 Jan 2020 06:04  Mg     2.2     01-31      CAPILLARY BLOOD GLUCOSE            < from: Xray Chest 1 View- PORTABLE-Urgent (01.28.20 @ 14:07) >    EXAM:  XR CHEST PORTABLE URGENT 1V                            PROCEDURE DATE:  01/28/2020            INTERPRETATION:  CLINICAL INFORMATION: Hypoxia.    EXAM: Frontal chest radiograph dated 1/28/2020.    COMPARISON: Chest radiograph from 1/24/2020.    FINDINGS:  Minimal left basilar atelectasis  There are no pleural effusions or pneumothorax.  The cardiomediastinal silhouette is enlarged.    IMPRESSION:   Minimal left basilar atelectasis        < from: Xray Modified Barium Swallow (04.11.19 @ 09:36) >  INTERPRETATION:  CLINICAL INFORMATION:  dysphagia    TECHNIQUE: Various foods with different consistencies were mixed with   barium and patient was asked to consume them under direct fluoroscopic   evaluation.  The study was performed in cooperation with the speech   pathologist.    FLUOROSCOPIC TIME: 140.1 seconds    COMPARISON: None available.      IMPRESSION: Laryngeal penetration with honey thickened liquids and   mechanical soft textures. A full report will be issued by the department   of speech and hearing.                 ELISE URRUTIA M.D., RADIOLOGY RESIDENT  This document has been electronically signed.  DELLA KIRBY M.D., ATTENDING RADIOLOGIST  This document has been electronically signed. Apr 12 2019  2:54PM    < end of copied text >          KINZA BARTH M.D., RADIOLOGY RESIDENT  This document has been electronically signed.  KELY MOSHER M.D., ATTENDING RADIOLOGIST  This document has been electronically signed. Jan 28 2020  3:25PM              < end of copied text >            RECENT CULTURES:        RESPIRATORY CULTURES:          Studies  Chest X-RAY  CT SCAN Chest   Venous Dopplers: LE:   CT Abdomen  Others
Patient is a 89y old  Female who presents with a chief complaint of     SUBJECTIVE / OVERNIGHT EVENTS:  No SOB, no cough    Review of Systems:   CONSTITUTIONAL: No fever, weight loss, or fatigue  EYES: No eye pain, visual disturbances, or discharge  ENMT:  No difficulty hearing, tinnitus, vertigo; No sinus or throat pain  NECK: No pain or stiffness  BREASTS: No pain, masses, or nipple discharge  RESPIRATORY: No cough, wheezing, chills or hemoptysis; +shortness of breath  CARDIOVASCULAR: No chest pain, palpitations, dizziness, or leg swelling  GASTROINTESTINAL: No abdominal or epigastric pain. No nausea, vomiting, or hematemesis; No diarrhea or constipation. No melena or hematochezia.  GENITOURINARY: No dysuria, frequency, hematuria, or incontinence  NEUROLOGICAL: No headaches, memory loss, loss of strength, numbness, or tremors  SKIN: No itching, burning, rashes, or lesions   LYMPH NODES: No enlarged glands  ENDOCRINE: No heat or cold intolerance; No hair loss  MUSCULOSKELETAL: No joint pain or swelling; No muscle, back, or extremity pain  PSYCHIATRIC: No depression, anxiety, mood swings, or difficulty sleeping  HEME/LYMPH: No easy bruising, or bleeding gums  ALLERY AND IMMUNOLOGIC: No hives or eczema    MEDICATIONS  (STANDING):  amLODIPine   Tablet 5 milliGRAM(s) Oral daily  clopidogrel Tablet 75 milliGRAM(s) Oral daily  digoxin     Tablet 0.125 milliGRAM(s) Oral daily  enalapril 2.5 milliGRAM(s) Oral daily  escitalopram 15 milliGRAM(s) Oral daily  heparin  Injectable 5000 Unit(s) SubCutaneous two times a day  latanoprost 0.005% Ophthalmic Solution 1 Drop(s) Both EYES at bedtime  metoprolol tartrate 25 milliGRAM(s) Oral every 12 hours  pantoprazole    Tablet 40 milliGRAM(s) Oral before breakfast  timolol 0.5% Solution 1 Drop(s) Left EYE daily    MEDICATIONS  (PRN):  acetaminophen   Tablet .. 650 milliGRAM(s) Oral every 6 hours PRN Temp greater or equal to 38C (100.4F), Moderate Pain (4 - 6)  polyethylene glycol 3350 17 Gram(s) Oral daily PRN Constipation      PHYSICAL EXAM:  Vital Signs Last 24 Hrs  T(C): 36.7 (2020 16:38), Max: 36.9 (2020 00:58)  T(F): 98 (2020 16:38), Max: 98.5 (2020 00:58)  HR: 72 (2020 16:38) (60 - 88)  BP: 147/93 (2020 16:38) (147/84 - 171/100)  BP(mean): --  RR: 20 (2020 16:38) (18 - 26)  SpO2: 100% (2020 16:38) (98% - 100%)  I&O's Summary    GENERAL: NAD, well-developed  HEAD:  Atraumatic, Normocephalic  EYES: EOMI, PERRLA, conjunctiva and sclera clear  NECK: Supple, No JVD  CHEST/LUNG: Clear to auscultation bilaterally; No wheeze  HEART: Regular rate and rhythm; No murmurs, rubs, or gallops  ABDOMEN: Soft, Nontender, Nondistended; Bowel sounds present  EXTREMITIES:  2+ Peripheral Pulses, No clubbing, cyanosis, or edema  PSYCH: AAOx3  NEUROLOGY: non-focal  SKIN: No rashes or lesions    LABS:  CAPILLARY BLOOD GLUCOSE                              16.5   15.11 )-----------( 290      ( 2020 15:35 )             53.5     01-20    x   |  x   |  x   ----------------------------<  x   4.4   |  x   |  x     Ca    9.2      2020 15:35    TPro  8.0  /  Alb  3.7  /  TBili  0.7  /  DBili  x   /  AST  55<H>  /  ALT  15  /  AlkPhos  101  01-20    PT/INR - ( 2020 15:35 )   PT: 13.2 sec;   INR: 1.14 ratio         PTT - ( 2020 15:35 )  PTT:31.8 sec      Urinalysis Basic - ( 2020 17:24 )    Color: Yellow / Appearance: Turbid / S.021 / pH: x  Gluc: x / Ketone: Negative  / Bili: Negative / Urobili: Negative   Blood: x / Protein: 300 mg/dL / Nitrite: Negative   Leuk Esterase: Large / RBC: 18 /hpf / WBC >50   Sq Epi: x / Non Sq Epi: 3 / Bacteria: Many        RADIOLOGY & ADDITIONAL TESTS:    Imaging Personally Reviewed:    Consultant(s) Notes Reviewed:      Care Discussed with Consultants/Other Providers:

## 2020-02-01 NOTE — PROGRESS NOTE ADULT - PROBLEM SELECTOR PLAN 2
BiPAP placed. Patient is DNR/ DNI
Improved now  Patient is DNR/ DNI
Improved now  Patient is DNR/ DNI  Chest X Ray is clear
Improved now  Patient is DNR/ DNI  Chest X Ray is clear  Prob has underlying COPD, now with hypoxia. Needs home Oxygen
Improved now  Patient is DNR/ DNI  Chest X Ray is clear  Prob has underlying COPD, now with hypoxia. Needs home Oxygen
Observe on current meds
Patient needs full nursing care.   PPS 20 %
cont steroids: wheezing + chest x-ray is clear:
cont steroids: wheezing + chest x-ray is clear:  1/24: do cta
cont steroids: wheezing + chest x-ray is clear:  1/24: do cta  1/25" Cont steorids : decrease tomorrow
cont steroids: wheezing + chest x-ray is clear:  1/24: do cta  1/25" Cont steorids : decrease tomorrow  1/26:off steroids now
cont steroids: wheezing + chest x-ray is clear:  1/24: do cta  1/25" Cont steorids : decrease tomorrow  1/26:off steroids now  1/27: off steroids now
cont steroids: wheezing + chest x-ray is clear:  1/24: do cta  1/25" Cont steorids : decrease tomorrow  1/26:off steroids now  1/27: off steroids now  1/28: antibiotics to be cont for few more days: cont oxygen and wean as tolerated:
cont steroids: wheezing + chest x-ray is clear:  1/24: do cta  1/25" Cont steorids : decrease tomorrow  1/26:off steroids now  1/27: off steroids now  1/28: antibiotics to be cont for few more days: cont oxygen and wean as tolerated:  1/29: antibiotics till today : cont antibiotics
cont steroids: wheezing + chest x-ray is clear:  1/24: do cta  1/25" Cont steorids : decrease tomorrow  1/26:off steroids now  1/27: off steroids now  1/28: antibiotics to be cont for few more days: cont oxygen and wean as tolerated:  1/29: antibiotics till today : cont antibiotics  1/30; finishing antibiotics; on oxygen: needs oxygen upn dc
cont steroids: wheezing + chest x-ray is clear:  1/24: do cta  1/25" Cont steorids : decrease tomorrow  1/26:off steroids now  1/27: off steroids now  1/28: antibiotics to be cont for few more days: cont oxygen and wean as tolerated:  1/29: antibiotics till today : cont antibiotics  1/30; finishing antibiotics; on oxygen: needs oxygen upn dc  1/31: for dc : on oxygen : remains at risk for aspiration
Improved now  Patient is DNR/ DNI  Chest X Ray is clear  Prob has underlying COPD, now with hypoxia. Needs home Oxygen

## 2020-02-01 NOTE — PROGRESS NOTE ADULT - PROBLEM SELECTOR PLAN 4
Suportive care
Suportive care
Supportive care
Planning for home hospice.   Not a candidate for PCU at this time.
Supportive care

## 2020-02-01 NOTE — PROGRESS NOTE ADULT - PROBLEM SELECTOR PROBLEM 2
Acute respiratory failure with hypoxemia
Benign essential hypertension
Acute respiratory failure with hypoxemia
Functional quadriplegia
Acute respiratory failure with hypoxemia
Acute respiratory failure with hypoxemia

## 2020-02-01 NOTE — PROGRESS NOTE ADULT - PROBLEM SELECTOR PLAN 3
Observe on current meds
Supportive care
Controlled
Controlled  1/24: controlled
Controlled  1/24: controlled  1/25: stable
Controlled  1/24: controlled  1/25: stable  1/26: controlled
Controlled  1/24: controlled  1/25: stable  1/26: controlled  1/27: controlled
Controlled  1/24: controlled  1/25: stable  1/26: controlled  1/27: controlled  1/28: controlled
Controlled  1/24: controlled  1/25: stable  1/26: controlled  1/27: controlled  1/28: controlled  1/29: contorlled
Controlled  1/24: controlled  1/25: stable  1/26: controlled  1/27: controlled  1/28: controlled  1/29: contorlled  1/30; controlled
Patient is already DNR/I  On Pleasure feeds.
Controlled  1/24: controlled  1/25: stable  1/26: controlled  1/27: controlled  1/28: controlled  1/29: contorlled  1/30; controlled  1/3: stable
Observe on current meds

## 2020-02-01 NOTE — PROGRESS NOTE ADULT - PROBLEM SELECTOR PROBLEM 4
Dementia without behavioral disturbance, unspecified dementia type
Encounter for palliative care
Dementia without behavioral disturbance, unspecified dementia type
Dementia without behavioral disturbance, unspecified dementia type

## 2020-02-01 NOTE — PROGRESS NOTE ADULT - NSHPATTENDINGPLANDISCUSS_GEN_ALL_CORE
[facility], her daughter and floor NP
[facility], her daughter and floor NP
NP
NP:
NP:Yaz
[facility], her daughter and floor NP
NP:

## 2020-02-01 NOTE — PROGRESS NOTE ADULT - PROVIDER SPECIALTY LIST ADULT
Internal Medicine
Palliative Care
Pulmonology

## 2020-02-01 NOTE — PROGRESS NOTE ADULT - PROBLEM SELECTOR PROBLEM 3
Benign essential hypertension
Dementia without behavioral disturbance, unspecified dementia type
Advanced care planning/counseling discussion
Benign essential hypertension

## 2020-02-02 NOTE — DISCHARGE NOTE FOR THE EXPIRED PATIENT - HOSPITAL COURSE
89 Y/O pmh  AFib, CAD, HLD, HTN, OA, GERD, dementia admitted with sudden onset SOB, +RSV, +UTI .JONATAN recommended, however daughter dose not want JONATAN, want pt to go back to [facility]. Consulted palliative care for evaluation. Patient was approved for home hospice and return to the [facility] in [facility].This morning RRT was called for unresponsiveness for no palpable pulse noted by the RN. Death was pronounced at 8: 18 am.Cause of death was cardio pulmonary arrest. Daughter Radha Kirkpatrick  and attending Dr. Brambila was notified.

## 2020-02-02 NOTE — CHART NOTE - NSCHARTNOTEFT_GEN_A_CORE
Death Pronounce note:    Called to pronounce, patient with cardiopulmonary arrest secondary to COPD and advanced dementia . Patient found in bed , RRT team in the room. Heart sounds absent, no spontaneous respiration, no palpable B/P or pulse, pupil fixed and dilated. Pounced death at 8:18 am . Daughter (HCP) Radha Kirkpatrick  contacted , including  and notified. Refused autopsy. Not an ME case.

## 2020-05-06 NOTE — H&P ADULT - PROBLEM/PLAN-1
CC:  Shaniqua Castillo is here today for Md Call (HTN)    Medications: medications verified and updated  Added preferred pharmacy  Refills needed today?  NO  denies Latex allergy or sensitivity  Health Maintenance Due   Topic Date Due   • Shingles Vaccine (1 of 2) 10/08/2014     Patient is up to date, no discussion needed.    Patient would like communication of their results via:      Home Phone: [phone] (home)  Okay to leave a message containing results? Yes   DISPLAY PLAN FREE TEXT

## 2020-08-04 NOTE — ED PROVIDER NOTE - TEMPLATE, MLM
Outreach attempt regarding pt recent visit to the ED. Pt was unable to reach; writer left VM message with contact information. started taking the Amoxicillin and her pain is decreasing in her throat. Pt mother reports pt has the information for Find a Doc and pt mom is letting patient call to find a new PCP. General

## 2021-01-26 NOTE — ED PROVIDER NOTE - OBJECTIVE STATEMENT
89 YOF PMHx of Dementia, HTN, HLD, CAD, Afib, p/w cough and sob from [facility]. Pt denies having any symptoms and is upset that she was sent here. Pt appears confused and is not coherently answering questions. Per accompanying paperwork pt has has a worsening productive cough and has appeared to have laboured breathing. Sent in for evaluation.
The patient is a 49y Female complaining of

## 2021-05-10 NOTE — ED ADULT NURSE NOTE - CHPI ED NUR SEVERITY2
Procedure   Large Joint Arthrocentesis  Date/Time: 5/10/2021 5:26 PM  Consent given by: patient  Timeout: Immediately prior to procedure a time out was called to verify the correct patient, procedure, equipment, support staff and site/side marked as required   Supporting Documentation  Indications: pain   Procedure Details  Location: knee - Knee joint: Bilateral knees.  Preparation: Patient was prepped and draped in the usual sterile fashion  Needle size: 25 G  Medications administered: 80 mg triamcinolone acetonide 40 MG/ML (2cc of 1% lidocaine without epinepherine, and 2cc of 40mg Kenalog)  Patient tolerance: patient tolerated the procedure well with no immediate complications (Blood loss negligable, pt admits to immediate decrease in pain and improved ROM with gentle ambulation post injection.)            PAIN SCALE 0 OF 10.

## 2022-01-19 NOTE — ED ADULT NURSE NOTE - NSFALLRSKPASTHIST_ED_ALL_ED
Impression: S/P RRD (31672) 25g PPVx/ AFx/ EL/ IL/  LAx C3F8 (18%)/ OD - 7 Days. Encounter for surgical aftercare following surgery on a sense organ  Z48.810.  Post operative instructions reviewed - Condition is improving -

PF qid OD Plan: RTC 2 weeks PO with Dr. Valinda Shone
no

## 2022-07-19 NOTE — PHYSICAL THERAPY INITIAL EVALUATION ADULT - GAIT DISTANCE, PT EVAL
Labor Progress Note    Johnny Pelletier is a 39 y.o. female  at 36w3d  The patient was seen and examined. Her pain is well controlled. She reports fetal movement is present, complains of contractions, complains of loss of fluid, denies vaginal bleeding.        Vital Signs:  Vitals:    22 0700 22 0730 22 0800 22 0830   BP: (!) 101/57 114/66 115/75 (!) 109/52   Pulse: 57 53 71 62   Resp: 18   18   Temp:    98.3 °F (36.8 °C)   TempSrc:    Oral   SpO2: 98%  99%         FHT: 140, moderate variability, accelerations present, decelerations absent  Contractions: regular, every 3-4 minutes    Chaperone for Intimate Exam: Chaperone 2 feet

## 2023-03-15 NOTE — ED ADULT TRIAGE NOTE - DIRECT TO ROOM CARE INITIATED:
24-Mar-2018 12:51 Low Dose Naltrexone Pregnancy And Lactation Text: Naltrexone is pregnancy category C.  There have been no adequate and well-controlled studies in pregnant women.  It should be used in pregnancy only if the potential benefit justifies the potential risk to the fetus.   Limited data indicates that naltrexone is minimally excreted into breastmilk.

## 2023-07-25 NOTE — ED ADULT NURSE NOTE - CCCP TRG CHIEF CMPLNT
-No driving for 24 hours and while taking narcotic pain medication.    -May remove band-aid tomorrow and shower.    - Report to ER with any bleeding, heavy bruising at site, or SEVERE/SUDDEN unrelieved abdominal pain.    - Biopsy results will be sent to your oncologist/referring physician.    - Call with any questions or concerns.    -May take over the counter meds or use ice packs for pain/discomfort.     knee pain/injury

## 2023-08-17 NOTE — ED ADULT NURSE NOTE - CHIEF COMPLAINT QUOTE
LOV 07/17/2023    patient requesting refill for   ketoconazole (NIZORAL) 2 % shampoo 120 mL           RX pending   pharmacy confirmed  please advise     
R knee pain s/p fall last week - unable to ambulate

## 2023-08-31 NOTE — PHYSICAL THERAPY INITIAL EVALUATION ADULT - FOLLOWS COMMANDS/ANSWERS QUESTIONS, REHAB EVAL
Continue Adderall XR 25 mg daily. Medication agreement up to date. The effects and side effects of the medications and precautions to be taken while on the medications were discussed with the patient.   We discussed the natural history of ADHD and long-term management.  We also had an extensive discussion about the role of medicine, controlled substances policy, abusive potential, risk and benefits.    75% of the time
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
## 2024-03-05 NOTE — ED PROVIDER NOTE - NS ED ATTENDING STATEMENT MOD
[de-identified] : sore throat, vomiting last night, stuffy nose, I have personally seen and examined this patient.  I have fully participated in the care of this patient. I have reviewed all pertinent clinical information, including history, physical exam, plan and the Resident’s note and agree except as noted. [FreeTextEntry6] : sore throat, congestion, coughing, and vomited x1 after coughing a lot (yesterday), no fever, for few days brother sick with congestion/cough No respiratory distress, no wheezing or dyspnea. No rashes, no lethargy. No abdominal pain, no diarrhea, stooling normally. Voiding normally, eating and drinking well. No concern for dehydration.   No recent travel. No other concerns at this time

## 2024-04-09 NOTE — GOALS OF CARE CONVERSATION - ADVANCED CARE PLANNING - NS PRO AD ANY ON CHART
Database initiated. Patient is A&O comes in from home with . States she uses a walker and is RA at baseline. She has fallen recently.      No

## 2024-05-29 NOTE — ED ADULT NURSE NOTE - CAS TRG GEN SKIN CONDITION
Patient seen and examined.  Patient is calm, not agitated or aggressive on my exam.  She does display poor insight and judgment.  Her thought process is disorganized and on my exam seems to be responding to some internal stimuli.  At this point, I agree with the 302 petition.  I do feel that she is unable to care for herself mentally and appears at risk for rapid decompensation based on her presentation and past medical history.  She would be at risk to harming herself or others and would benefit from inpatient psychiatric treatment.     Yvon Cooney Jr., DO  05/29/24 0226     Warm

## 2025-02-06 NOTE — PROGRESS NOTE ADULT - SUBJECTIVE AND OBJECTIVE BOX
Result note: LMTCB.  Please let patient know her EKG and chest x-ray are unremarkable.  Her blood tests are okay overall except for the urinalysis has a few abnormalities.  Culture is pending.  Please ask about today's blood pressure readings and update on symptoms.  Thank you.     Patient returned call to state she is feeling a lot better but still has that adrenaline feeling -     BP reading 155/83   Patient is a 89y old  Female who presents with a chief complaint of cough and sob (31 Mar 2019 16:27)      SUBJECTIVE / OVERNIGHT EVENTS:  cough better, afebrile  Review of Systems:   CONSTITUTIONAL: No fever, weight loss, or fatigue  EYES: No eye pain, visual disturbances, or discharge  ENMT:  No difficulty hearing, tinnitus, vertigo; No sinus or throat pain  NECK: No pain or stiffness  BREASTS: No pain, masses, or nipple discharge  RESPIRATORY: No cough, wheezing, chills or hemoptysis; No shortness of breath  CARDIOVASCULAR: No chest pain, palpitations, dizziness, or leg swelling  GASTROINTESTINAL: No abdominal or epigastric pain. No nausea, vomiting, or hematemesis; No diarrhea or constipation. No melena or hematochezia.  GENITOURINARY: No dysuria, frequency, hematuria, or incontinence  NEUROLOGICAL: No headaches, memory loss, loss of strength, numbness, or tremors  SKIN: No itching, burning, rashes, or lesions   LYMPH NODES: No enlarged glands  ENDOCRINE: No heat or cold intolerance; No hair loss  MUSCULOSKELETAL: No joint pain or swelling; No muscle, back, or extremity pain  PSYCHIATRIC: No depression, anxiety, mood swings, or difficulty sleeping  HEME/LYMPH: No easy bruising, or bleeding gums  ALLERY AND IMMUNOLOGIC: No hives or eczema    MEDICATIONS  (STANDING):  amLODIPine   Tablet 5 milliGRAM(s) Oral daily  clopidogrel Tablet 75 milliGRAM(s) Oral daily  digoxin     Tablet 0.125 milliGRAM(s) Oral daily  enalapril 2.5 milliGRAM(s) Oral daily  escitalopram 10 milliGRAM(s) Oral daily  guaiFENesin/dextromethorphan  Syrup 10 milliLiter(s) Oral every 4 hours  heparin  Injectable 5000 Unit(s) SubCutaneous every 12 hours  latanoprost 0.005% Ophthalmic Solution 1 Drop(s) Both EYES at bedtime  metoprolol tartrate 25 milliGRAM(s) Oral every 12 hours  pantoprazole    Tablet 40 milliGRAM(s) Oral before breakfast  piperacillin/tazobactam IVPB. 3.375 Gram(s) IV Intermittent every 8 hours  timolol 0.5% Solution 1 Drop(s) Left EYE daily  vancomycin  IVPB 1000 milliGRAM(s) IV Intermittent every 12 hours    MEDICATIONS  (PRN):  acetaminophen   Tablet .. 650 milliGRAM(s) Oral every 6 hours PRN Temp greater or equal to 38C (100.4F), Moderate Pain (4 - 6)  haloperidol    Concentrate 0.5 milliGRAM(s) Oral every 12 hours PRN Agitation      PHYSICAL EXAM:  Vital Signs Last 24 Hrs  T(C): 36.5 (01 Apr 2019 04:38), Max: 36.6 (31 Mar 2019 14:30)  T(F): 97.7 (01 Apr 2019 04:38), Max: 97.9 (31 Mar 2019 14:30)  HR: 70 (01 Apr 2019 04:38) (59 - 70)  BP: 123/77 (01 Apr 2019 04:38) (123/77 - 146/79)  BP(mean): --  RR: 18 (01 Apr 2019 04:38) (18 - 18)  SpO2: 92% (01 Apr 2019 04:38) (90% - 94%)  I&O's Summary    31 Mar 2019 07:01  -  01 Apr 2019 07:00  --------------------------------------------------------  IN: 1610 mL / OUT: 0 mL / NET: 1610 mL      GENERAL: NAD, well-developed  HEAD:  Atraumatic, Normocephalic  EYES: EOMI, PERRLA, conjunctiva and sclera clear  NECK: Supple, No JVD  CHEST/LUNG: Clear to auscultation bilaterally; No wheeze  HEART: Regular rate and rhythm; No murmurs, rubs, or gallops  ABDOMEN: Soft, Nontender, Nondistended; Bowel sounds present  EXTREMITIES:  2+ Peripheral Pulses, No clubbing, cyanosis, or edema  PSYCH: AAOx3  NEUROLOGY: non-focal  SKIN: No rashes or lesions    LABS:  CAPILLARY BLOOD GLUCOSE                              13.7   11.08 )-----------( 259      ( 01 Apr 2019 09:35 )             42.6     04-01    142  |  103  |  10  ----------------------------<  106<H>  3.5   |  25  |  0.58    Ca    8.9      01 Apr 2019 06:15                RADIOLOGY & ADDITIONAL TESTS:    Imaging Personally Reviewed:    Consultant(s) Notes Reviewed:      Care Discussed with Consultants/Other Providers:
